# Patient Record
Sex: MALE | Race: WHITE | Employment: OTHER | URBAN - METROPOLITAN AREA
[De-identification: names, ages, dates, MRNs, and addresses within clinical notes are randomized per-mention and may not be internally consistent; named-entity substitution may affect disease eponyms.]

---

## 2017-01-06 ENCOUNTER — HOSPITAL ENCOUNTER (EMERGENCY)
Age: 62
Discharge: HOME | End: 2017-01-06
Attending: EMERGENCY MEDICINE
Payer: COMMERCIAL

## 2017-01-06 ENCOUNTER — APPOINTMENT (OUTPATIENT)
Dept: GENERAL RADIOLOGY | Age: 62
End: 2017-01-06
Attending: EMERGENCY MEDICINE
Payer: COMMERCIAL

## 2017-01-06 VITALS
BODY MASS INDEX: 28.44 KG/M2 | WEIGHT: 210 LBS | SYSTOLIC BLOOD PRESSURE: 154 MMHG | HEART RATE: 73 BPM | DIASTOLIC BLOOD PRESSURE: 78 MMHG | RESPIRATION RATE: 16 BRPM | OXYGEN SATURATION: 97 % | HEIGHT: 72 IN | TEMPERATURE: 99 F

## 2017-01-06 DIAGNOSIS — S16.1XXA CERVICAL STRAIN, INITIAL ENCOUNTER: ICD-10-CM

## 2017-01-06 DIAGNOSIS — S29.019A STRAIN OF THORACIC SPINE, INITIAL ENCOUNTER: Primary | ICD-10-CM

## 2017-01-06 PROCEDURE — 99284 EMERGENCY DEPT VISIT MOD MDM: CPT

## 2017-01-06 PROCEDURE — 72052 X-RAY EXAM NECK SPINE 6/>VWS: CPT

## 2017-01-06 PROCEDURE — 72072 X-RAY EXAM THORAC SPINE 3VWS: CPT

## 2017-01-06 RX ORDER — NAPROXEN 500 MG/1
500 TABLET ORAL 2 TIMES DAILY PRN
Qty: 20 TABLET | Refills: 0 | Status: SHIPPED | OUTPATIENT
Start: 2017-01-06 | End: 2020-05-30

## 2017-01-06 RX ORDER — ASPIRIN 81 MG/1
162 TABLET, CHEWABLE ORAL DAILY
COMMUNITY

## 2017-01-06 RX ORDER — SIMVASTATIN 20 MG
20 TABLET ORAL NIGHTLY
COMMUNITY
End: 2020-05-30

## 2017-01-06 RX ORDER — DIAZEPAM 5 MG/1
TABLET ORAL NIGHTLY PRN
Qty: 20 TABLET | Refills: 0 | Status: SHIPPED | OUTPATIENT
Start: 2017-01-06 | End: 2020-05-30

## 2017-01-06 RX ORDER — TRAMADOL HYDROCHLORIDE 50 MG/1
TABLET ORAL EVERY 4 HOURS PRN
Qty: 20 TABLET | Refills: 0 | Status: SHIPPED | OUTPATIENT
Start: 2017-01-06 | End: 2017-01-13

## 2017-01-06 RX ORDER — ENALAPRIL MALEATE 5 MG/1
5 TABLET ORAL DAILY
COMMUNITY
End: 2021-01-14
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
## 2017-01-12 ENCOUNTER — APPOINTMENT (OUTPATIENT)
Dept: CT IMAGING | Age: 62
End: 2017-01-12
Attending: EMERGENCY MEDICINE
Payer: COMMERCIAL

## 2017-01-12 ENCOUNTER — HOSPITAL ENCOUNTER (EMERGENCY)
Age: 62
Discharge: HOME | End: 2017-01-12
Attending: EMERGENCY MEDICINE
Payer: COMMERCIAL

## 2017-01-12 ENCOUNTER — APPOINTMENT (OUTPATIENT)
Dept: GENERAL RADIOLOGY | Age: 62
End: 2017-01-12
Attending: EMERGENCY MEDICINE
Payer: COMMERCIAL

## 2017-01-12 VITALS
DIASTOLIC BLOOD PRESSURE: 62 MMHG | TEMPERATURE: 98 F | HEART RATE: 74 BPM | RESPIRATION RATE: 16 BRPM | BODY MASS INDEX: 27.77 KG/M2 | WEIGHT: 205 LBS | SYSTOLIC BLOOD PRESSURE: 125 MMHG | HEIGHT: 72 IN | OXYGEN SATURATION: 98 %

## 2017-01-12 DIAGNOSIS — H81.10 VERTIGO, BENIGN POSITIONAL, UNSPECIFIED LATERALITY: Primary | ICD-10-CM

## 2017-01-12 LAB
ALBUMIN SERPL-MCNC: 3.6 G/DL (ref 3.5–4.8)
ALP LIVER SERPL-CCNC: 88 U/L (ref 45–117)
ALT SERPL-CCNC: 35 U/L (ref 17–63)
APTT PPP: 24.6 SECONDS (ref 25–34)
AST SERPL-CCNC: 22 U/L (ref 15–41)
ATRIAL RATE: 83 BPM
BASOPHILS # BLD AUTO: 0.02 X10(3) UL (ref 0–0.1)
BASOPHILS NFR BLD AUTO: 0.3 %
BILIRUB SERPL-MCNC: 0.4 MG/DL (ref 0.1–2)
BUN BLD-MCNC: 12 MG/DL (ref 8–20)
CALCIUM BLD-MCNC: 8.7 MG/DL (ref 8.3–10.3)
CHLORIDE: 109 MMOL/L (ref 101–111)
CO2: 25 MMOL/L (ref 22–32)
CREAT BLD-MCNC: 0.91 MG/DL (ref 0.7–1.3)
D-DIMER: 1.86 UG/ML FEU (ref 0–0.49)
EOSINOPHIL # BLD AUTO: 0.3 X10(3) UL (ref 0–0.3)
EOSINOPHIL NFR BLD AUTO: 5 %
ERYTHROCYTE [DISTWIDTH] IN BLOOD BY AUTOMATED COUNT: 11.9 % (ref 11.5–16)
GLUCOSE BLD-MCNC: 102 MG/DL (ref 70–99)
HCT VFR BLD AUTO: 41 % (ref 37–53)
HGB BLD-MCNC: 14.2 G/DL (ref 13–17)
IMMATURE GRANULOCYTE COUNT: 0.01 X10(3) UL (ref 0–1)
IMMATURE GRANULOCYTE RATIO %: 0.2 %
INR BLD: 0.97 (ref 0.89–1.12)
LYMPHOCYTES # BLD AUTO: 1.85 X10(3) UL (ref 0.9–4)
LYMPHOCYTES NFR BLD AUTO: 30.7 %
M PROTEIN MFR SERPL ELPH: 7.2 G/DL (ref 6.1–8.3)
MCH RBC QN AUTO: 29.2 PG (ref 27–33.2)
MCHC RBC AUTO-ENTMCNC: 34.6 G/DL (ref 31–37)
MCV RBC AUTO: 84.2 FL (ref 80–99)
MONOCYTES # BLD AUTO: 0.51 X10(3) UL (ref 0.1–0.6)
MONOCYTES NFR BLD AUTO: 8.5 %
NEUTROPHIL ABS PRELIM: 3.34 X10 (3) UL (ref 1.3–6.7)
NEUTROPHILS # BLD AUTO: 3.34 X10(3) UL (ref 1.3–6.7)
NEUTROPHILS NFR BLD AUTO: 55.3 %
P AXIS: 72 DEGREES
P-R INTERVAL: 170 MS
PLATELET # BLD AUTO: 183 10(3)UL (ref 150–450)
POTASSIUM SERPL-SCNC: 3.9 MMOL/L (ref 3.6–5.1)
PSA SERPL DL<=0.01 NG/ML-MCNC: 12.6 SECONDS (ref 11.8–14.2)
Q-T INTERVAL: 356 MS
QRS DURATION: 84 MS
QTC CALCULATION (BEZET): 418 MS
R AXIS: 55 DEGREES
RBC # BLD AUTO: 4.87 X10(6)UL (ref 4.3–5.7)
RED CELL DISTRIBUTION WIDTH-SD: 36.1 FL (ref 35.1–46.3)
SODIUM SERPL-SCNC: 143 MMOL/L (ref 136–144)
T AXIS: 66 DEGREES
TROPONIN: <0.046 NG/ML (ref ?–0.05)
VENTRICULAR RATE: 83 BPM
WBC # BLD AUTO: 6 X10(3) UL (ref 4–13)

## 2017-01-12 PROCEDURE — 85730 THROMBOPLASTIN TIME PARTIAL: CPT | Performed by: EMERGENCY MEDICINE

## 2017-01-12 PROCEDURE — 84484 ASSAY OF TROPONIN QUANT: CPT | Performed by: EMERGENCY MEDICINE

## 2017-01-12 PROCEDURE — 93005 ELECTROCARDIOGRAM TRACING: CPT

## 2017-01-12 PROCEDURE — 85610 PROTHROMBIN TIME: CPT | Performed by: EMERGENCY MEDICINE

## 2017-01-12 PROCEDURE — 71275 CT ANGIOGRAPHY CHEST: CPT

## 2017-01-12 PROCEDURE — 80053 COMPREHEN METABOLIC PANEL: CPT | Performed by: EMERGENCY MEDICINE

## 2017-01-12 PROCEDURE — 96374 THER/PROPH/DIAG INJ IV PUSH: CPT

## 2017-01-12 PROCEDURE — 70450 CT HEAD/BRAIN W/O DYE: CPT

## 2017-01-12 PROCEDURE — 93010 ELECTROCARDIOGRAM REPORT: CPT

## 2017-01-12 PROCEDURE — 85025 COMPLETE CBC W/AUTO DIFF WBC: CPT | Performed by: EMERGENCY MEDICINE

## 2017-01-12 PROCEDURE — 99285 EMERGENCY DEPT VISIT HI MDM: CPT

## 2017-01-12 PROCEDURE — 71020 XR CHEST PA + LAT CHEST (CPT=71020): CPT

## 2017-01-12 PROCEDURE — 85378 FIBRIN DEGRADE SEMIQUANT: CPT | Performed by: EMERGENCY MEDICINE

## 2017-01-12 RX ORDER — MECLIZINE HYDROCHLORIDE 25 MG/1
25 TABLET ORAL 3 TIMES DAILY PRN
Qty: 20 TABLET | Refills: 0 | Status: SHIPPED | OUTPATIENT
Start: 2017-01-12 | End: 2020-05-30

## 2017-01-12 RX ORDER — MECLIZINE HYDROCHLORIDE 25 MG/1
25 TABLET ORAL ONCE
Status: COMPLETED | OUTPATIENT
Start: 2017-01-12 | End: 2017-01-12

## 2017-01-12 RX ORDER — ONDANSETRON 4 MG/1
4 TABLET, FILM COATED ORAL EVERY 8 HOURS PRN
Qty: 20 TABLET | Refills: 0 | Status: SHIPPED | OUTPATIENT
Start: 2017-01-12 | End: 2020-05-30

## 2017-01-12 RX ORDER — ONDANSETRON 2 MG/ML
4 INJECTION INTRAMUSCULAR; INTRAVENOUS ONCE
Status: COMPLETED | OUTPATIENT
Start: 2017-01-12 | End: 2017-01-12
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
## 2017-01-12 NOTE — ED PROVIDER NOTES
Patient Seen in: THE [facility] Emergency Department In [place]    History   Patient presents with:  Dizziness (neurologic)    Stated Complaint: dizzy on and off since Sunday, slight difficulty breathing    HPI    81-year-old male presents for evaluation of d (5-10 mg total) by mouth nightly as needed (muscle spasm). TraMADol HCl 50 MG Oral Tab,  Take 1-2 tablets ( mg total) by mouth every 4 (four) hours as needed for Pain.    naproxen 500 MG Oral Tab,  Take 1 tablet (500 mg total) by mouth 2 (two) times Range is approximately 65- 104 seconds. The therapeutic range has been validated against 0.3-0.7 heparin anti-Xa units/mL.      D-DIMER - Abnormal; Notable for the following:     D-Dimer 1.86 (*)     All other components within normal limits    Narrative: shows no pulmonary embolism or dissection. Evaluation and treatment plan discussed with patient. I recommend Antivert and Zofran as needed for symptoms.   Patient should be reevaluated with his primary care physician within the next 1-2 days to ensure h

## 2019-01-23 ENCOUNTER — HOSPITAL ENCOUNTER (OUTPATIENT)
Dept: CV DIAGNOSTICS | Age: 64
Discharge: HOME | End: 2019-01-23
Attending: INTERNAL MEDICINE
Payer: COMMERCIAL

## 2019-01-23 DIAGNOSIS — R01.1 MURMUR: ICD-10-CM

## 2019-02-04 ENCOUNTER — HOSPITAL ENCOUNTER (OUTPATIENT)
Dept: CV DIAGNOSTICS | Age: 64
Discharge: HOME | End: 2019-02-04
Attending: INTERNAL MEDICINE
Payer: COMMERCIAL

## 2019-02-04 DIAGNOSIS — G80.9 CP (CEREBRAL PALSY) (HCC): ICD-10-CM

## 2019-02-04 PROCEDURE — 93018 CV STRESS TEST I&R ONLY: CPT | Performed by: INTERNAL MEDICINE

## 2019-02-04 PROCEDURE — 93017 CV STRESS TEST TRACING ONLY: CPT | Performed by: INTERNAL MEDICINE

## 2019-02-04 PROCEDURE — 78452 HT MUSCLE IMAGE SPECT MULT: CPT | Performed by: INTERNAL MEDICINE

## 2020-01-03 ENCOUNTER — HOSPITAL ENCOUNTER (OUTPATIENT)
Dept: CV DIAGNOSTICS | Facility: HOSPITAL | Age: 65
Discharge: HOME | End: 2020-01-03
Attending: INTERNAL MEDICINE
Payer: COMMERCIAL

## 2020-01-03 DIAGNOSIS — R07.2 CHEST PAIN, PRECORDIAL: ICD-10-CM

## 2020-01-03 DIAGNOSIS — Z82.49 FH: EARLY CORONARY ARTERY DISEASE: ICD-10-CM

## 2020-01-03 DIAGNOSIS — I35.0 NONRHEUMATIC AORTIC VALVE STENOSIS: ICD-10-CM

## 2020-01-06 ENCOUNTER — HOSPITAL ENCOUNTER (OUTPATIENT)
Dept: CV DIAGNOSTICS | Facility: HOSPITAL | Age: 65
Discharge: HOME | End: 2020-01-06
Attending: INTERNAL MEDICINE
Payer: COMMERCIAL

## 2020-01-06 DIAGNOSIS — Z82.49 FAMILY HISTORY OF EARLY CAD: ICD-10-CM

## 2020-01-06 DIAGNOSIS — I35.0 NONRHEUMATIC AORTIC (VALVE) STENOSIS: ICD-10-CM

## 2020-01-06 PROCEDURE — 93018 CV STRESS TEST I&R ONLY: CPT | Performed by: INTERNAL MEDICINE

## 2020-01-06 PROCEDURE — 78452 HT MUSCLE IMAGE SPECT MULT: CPT | Performed by: INTERNAL MEDICINE

## 2020-01-06 PROCEDURE — 93017 CV STRESS TEST TRACING ONLY: CPT | Performed by: INTERNAL MEDICINE

## 2020-01-10 ENCOUNTER — HOSPITAL ENCOUNTER (OUTPATIENT)
Dept: CT IMAGING | Facility: HOSPITAL | Age: 65
Discharge: HOME | End: 2020-01-10
Attending: INTERNAL MEDICINE
Payer: COMMERCIAL

## 2020-01-10 VITALS — RESPIRATION RATE: 20 BRPM | HEART RATE: 64 BPM | SYSTOLIC BLOOD PRESSURE: 109 MMHG | DIASTOLIC BLOOD PRESSURE: 66 MMHG

## 2020-01-10 DIAGNOSIS — I35.0 NONRHEUMATIC AORTIC (VALVE) STENOSIS: ICD-10-CM

## 2020-01-10 DIAGNOSIS — R07.9 CHEST PAIN: ICD-10-CM

## 2020-01-10 LAB — CREAT BLD-MCNC: 0.6 MG/DL (ref 0.7–1.3)

## 2020-01-10 PROCEDURE — 0503T CTA FRACTIONAL FLOW RESERVE ANALYSIS (CPT=0503T/0502T): CPT | Performed by: INTERNAL MEDICINE

## 2020-01-10 PROCEDURE — 75574 CT ANGIO HRT W/3D IMAGE: CPT | Performed by: INTERNAL MEDICINE

## 2020-01-10 PROCEDURE — 82565 ASSAY OF CREATININE: CPT

## 2020-01-10 PROCEDURE — 0502T CTA FRACTIONAL FLOW RESERVE ANALYSIS (CPT=0503T/0502T): CPT | Performed by: INTERNAL MEDICINE

## 2020-01-10 RX ORDER — METOPROLOL TARTRATE 5 MG/5ML
INJECTION INTRAVENOUS
Status: COMPLETED
Start: 2020-01-10 | End: 2020-01-10

## 2020-01-10 RX ORDER — DILTIAZEM HYDROCHLORIDE 5 MG/ML
INJECTION INTRAVENOUS
Status: COMPLETED
Start: 2020-01-10 | End: 2020-01-10

## 2020-01-10 RX ORDER — NITROGLYCERIN 0.4 MG/1
TABLET SUBLINGUAL
Status: COMPLETED
Start: 2020-01-10 | End: 2020-01-10

## 2020-01-10 RX ADMIN — METOPROLOL TARTRATE: 5 INJECTION INTRAVENOUS at 14:37:00

## 2020-01-10 RX ADMIN — DILTIAZEM HYDROCHLORIDE 5 MG: 5 INJECTION INTRAVENOUS at 14:47:00

## 2020-01-10 RX ADMIN — DILTIAZEM HYDROCHLORIDE 5 MG: 5 INJECTION INTRAVENOUS at 15:07:00

## 2020-01-10 RX ADMIN — NITROGLYCERIN 0.4 MG: 0.4 TABLET SUBLINGUAL at 15:00:00

## 2020-01-10 RX ADMIN — METOPROLOL TARTRATE 5 MG: 5 INJECTION INTRAVENOUS at 14:27:00

## 2020-01-10 RX ADMIN — NITROGLYCERIN 0.4 MG: 0.4 TABLET SUBLINGUAL at 15:12:00

## 2020-01-10 RX ADMIN — DILTIAZEM HYDROCHLORIDE 5 MG: 5 INJECTION INTRAVENOUS at 14:42:00

## 2020-01-10 RX ADMIN — METOPROLOL TARTRATE 5 MG: 5 INJECTION INTRAVENOUS at 14:32:00

## 2020-01-10 NOTE — IMAGING NOTE
Pt arrives to room 4 at 1412. Working with [address] K[address] Mccormack IV established by Lisa Sales to [facility] with 18 gauge angiocath x 1 attempt. Pt denies long acting nitrates. Pt positioned on CT table comfortably. Procedure explained and questions answered.  O2 applied vi

## 2020-05-29 DIAGNOSIS — Z01.812 PRE-PROCEDURE LAB EXAM: Primary | ICD-10-CM

## 2020-05-30 ENCOUNTER — APPOINTMENT (OUTPATIENT)
Dept: GENERAL RADIOLOGY | Age: 65
End: 2020-05-30
Attending: EMERGENCY MEDICINE
Payer: COMMERCIAL

## 2020-05-30 ENCOUNTER — HOSPITAL ENCOUNTER (EMERGENCY)
Age: 65
Discharge: HOME | End: 2020-05-30
Attending: EMERGENCY MEDICINE
Payer: COMMERCIAL

## 2020-05-30 VITALS
OXYGEN SATURATION: 98 % | SYSTOLIC BLOOD PRESSURE: 158 MMHG | DIASTOLIC BLOOD PRESSURE: 74 MMHG | RESPIRATION RATE: 16 BRPM | WEIGHT: 200 LBS | BODY MASS INDEX: 27.09 KG/M2 | HEART RATE: 95 BPM | TEMPERATURE: 99 F | HEIGHT: 72 IN

## 2020-05-30 DIAGNOSIS — M25.531 RIGHT WRIST PAIN: Primary | ICD-10-CM

## 2020-05-30 DIAGNOSIS — S63.501A SPRAIN OF RIGHT WRIST, INITIAL ENCOUNTER: ICD-10-CM

## 2020-05-30 PROCEDURE — 73110 X-RAY EXAM OF WRIST: CPT | Performed by: EMERGENCY MEDICINE

## 2020-05-30 PROCEDURE — 99283 EMERGENCY DEPT VISIT LOW MDM: CPT

## 2020-05-30 RX ORDER — ROSUVASTATIN CALCIUM 10 MG/1
10 TABLET, COATED ORAL NIGHTLY
COMMUNITY

## 2020-05-30 NOTE — ED PROVIDER NOTES
Patient Seen in: [facility] In [place]      History   Patient presents with:  Wrist Pain    Stated Complaint: wrist pain    HPI    61-year-old male presents emergency room with chief complaint of right wrist pain.   Patient states appr snuffbox, no tenderness over the dorsum or volar aspect of the wrist.  No erythema swelling or warmth the joint, full range of motion of the wrist.  No tenderness or swelling to the right hand.   Radial and ulnar pulse present and equal.  Capillary refill i

## 2020-07-29 ENCOUNTER — HOSPITAL ENCOUNTER (EMERGENCY)
Age: 65
Discharge: HOME | End: 2020-07-29
Attending: EMERGENCY MEDICINE
Payer: COMMERCIAL

## 2020-07-29 VITALS
DIASTOLIC BLOOD PRESSURE: 70 MMHG | RESPIRATION RATE: 16 BRPM | BODY MASS INDEX: 27.09 KG/M2 | HEIGHT: 72.05 IN | SYSTOLIC BLOOD PRESSURE: 158 MMHG | WEIGHT: 200 LBS | HEART RATE: 80 BPM | OXYGEN SATURATION: 96 %

## 2020-07-29 DIAGNOSIS — S81.812A LEG LACERATION, LEFT, INITIAL ENCOUNTER: Primary | ICD-10-CM

## 2020-07-29 PROCEDURE — 90471 IMMUNIZATION ADMIN: CPT

## 2020-07-29 PROCEDURE — 99283 EMERGENCY DEPT VISIT LOW MDM: CPT

## 2020-07-29 PROCEDURE — 12002 RPR S/N/AX/GEN/TRNK2.6-7.5CM: CPT
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
## 2020-07-30 NOTE — ED PROVIDER NOTES
Patient Seen in: THE [facility] Emergency Department In [place]      History   Patient presents with:  Laceration    Stated Complaint: laceration to left upper leg    HPI    29-year-old male presents with a laceration to the left leg.   He states he was using foreign body, vessel or nerve within the wound. The wound was closed using a simple closure with 4-0 nylon. The quality of the closure was excellent.   5 interrupted sutures were used for this closure          MDM     1.7 cm laceration to the medial aspect

## 2020-08-08 ENCOUNTER — HOSPITAL ENCOUNTER (EMERGENCY)
Age: 65
Discharge: HOME | End: 2020-08-08
Payer: COMMERCIAL

## 2020-08-08 VITALS
RESPIRATION RATE: 18 BRPM | HEART RATE: 72 BPM | TEMPERATURE: 98 F | SYSTOLIC BLOOD PRESSURE: 123 MMHG | OXYGEN SATURATION: 99 % | BODY MASS INDEX: 27.09 KG/M2 | WEIGHT: 200 LBS | HEIGHT: 72 IN | DIASTOLIC BLOOD PRESSURE: 62 MMHG

## 2020-08-08 DIAGNOSIS — Z48.02 ENCOUNTER FOR REMOVAL OF SUTURES: Primary | ICD-10-CM

## 2020-08-08 NOTE — ED PROVIDER NOTES
Patient Seen in: THE [facility] Emergency Department In [facility]      History   Patient presents with:  Sut Stap RingRemoval    Stated Complaint: TO ER FOR SUTURE REMOVAL    HPI    Patient is a pleasant 71-year-old male who arrives for suture removal.  10 days 1:29 pm    Follow-up:  Magalie Rodas MD  [address] [place] [address]  [phone]                Medications Prescribed:  Current Discharge Medication List

## 2020-09-01 ENCOUNTER — HOSPITAL ENCOUNTER (OUTPATIENT)
Dept: CV DIAGNOSTICS | Age: 65
Discharge: HOME | End: 2020-09-01
Attending: INTERNAL MEDICINE
Payer: COMMERCIAL

## 2020-09-01 DIAGNOSIS — I65.23 ASYMPTOMATIC CAROTID ARTERY STENOSIS, BILATERAL: ICD-10-CM

## 2020-10-29 ENCOUNTER — OFFICE VISIT (OUTPATIENT)
Dept: INTERNAL MEDICINE CLINIC | Facility: CLINIC | Age: 65
End: 2020-10-29
Payer: COMMERCIAL

## 2020-10-29 DIAGNOSIS — M25.531 RIGHT WRIST PAIN: ICD-10-CM

## 2020-10-29 DIAGNOSIS — I65.23 CAROTID STENOSIS, ASYMPTOMATIC, BILATERAL: Chronic | ICD-10-CM

## 2020-10-29 DIAGNOSIS — I35.0 NONRHEUMATIC AORTIC VALVE STENOSIS: Chronic | ICD-10-CM

## 2020-10-29 DIAGNOSIS — I25.10 CORONARY ARTERY DISEASE INVOLVING NATIVE CORONARY ARTERY OF NATIVE HEART WITHOUT ANGINA PECTORIS: Primary | Chronic | ICD-10-CM

## 2020-10-29 DIAGNOSIS — E78.00 PURE HYPERCHOLESTEROLEMIA: Chronic | ICD-10-CM

## 2020-10-29 DIAGNOSIS — Z00.00 PREVENTATIVE HEALTH CARE: ICD-10-CM

## 2020-10-29 DIAGNOSIS — I10 ESSENTIAL HYPERTENSION: ICD-10-CM

## 2020-10-29 DIAGNOSIS — Z12.5 SCREENING FOR MALIGNANT NEOPLASM OF PROSTATE: ICD-10-CM

## 2020-10-29 PROBLEM — Z82.49 FAMILY HISTORY OF EARLY CAD: Status: ACTIVE | Noted: 2019-10-24

## 2020-10-29 PROBLEM — Z82.49 FAMILY HISTORY OF EARLY CAD: Chronic | Status: ACTIVE | Noted: 2019-10-24

## 2020-10-29 PROCEDURE — 3079F DIAST BP 80-89 MM HG: CPT | Performed by: INTERNAL MEDICINE

## 2020-10-29 PROCEDURE — 3008F BODY MASS INDEX DOCD: CPT | Performed by: INTERNAL MEDICINE

## 2020-10-29 PROCEDURE — 3075F SYST BP GE 130 - 139MM HG: CPT | Performed by: INTERNAL MEDICINE

## 2020-10-29 PROCEDURE — 99214 OFFICE O/P EST MOD 30 MIN: CPT | Performed by: INTERNAL MEDICINE

## 2020-10-29 RX ORDER — MV-MINS/FOLIC/LYCOPENE/GINKGO 400-300MCG
1 TABLET ORAL DAILY
COMMUNITY

## 2020-10-29 RX ORDER — OMEPRAZOLE 20 MG/1
20 CAPSULE, DELAYED RELEASE ORAL DAILY
COMMUNITY
Start: 2020-08-03 | End: 2021-01-14

## 2020-10-29 NOTE — PROGRESS NOTES
Jcarlos Gonzales is a 60year old male. HPI:   Patient presents to establish care. He has been dealing with right wrist pain x 7-8 months. Underneath thumb. Aching in quality, moderate severity, intermittent timing. Pain with movement of arm.   He is 200 lb (90.7 kg)  07/29/20 : 200 lb (90.7 kg)  05/30/20 : 200 lb (90.7 kg)  01/12/17 : 205 lb (93 kg)  01/06/17 : 210 lb (95.3 kg)    EXAM:   /80   Pulse 72   Temp 97.8 °F (36.6 °C) (Oral)   Resp 16   Ht 70.3\"   Wt 220 lb 3.2 oz (99.9 kg)   SpO2 99% Continue enalapril 5 mg qd. Metabolic panel ordered. - COMP METABOLIC PANEL (14); Future    7. Preventative health care  8.  Screening for malignant neoplasm of prostate  Screening labs ordered as below - patient gets labs through [facility].  He is not sure

## 2020-10-29 NOTE — PATIENT INSTRUCTIONS
- We will get your previous records/colonoscopy records and let you know when you are due for the next colonoscopy  - You will be due for a pneumonia shot (Prevnar 13) after you turn 66  - Get blood work done when fasting (at least 8 hours).   We will call

## 2020-10-31 ENCOUNTER — TELEPHONE (OUTPATIENT)
Dept: INTERNAL MEDICINE CLINIC | Facility: CLINIC | Age: 65
End: 2020-10-31

## 2020-10-31 NOTE — TELEPHONE ENCOUNTER
Faxed medical records request to [facility] [address] ([facility] #[phone]).

## 2020-11-01 ENCOUNTER — MED REC SCAN ONLY (OUTPATIENT)
Dept: INTERNAL MEDICINE CLINIC | Facility: CLINIC | Age: 65
End: 2020-11-01

## 2020-11-01 VITALS
HEART RATE: 72 BPM | HEIGHT: 70.3 IN | OXYGEN SATURATION: 99 % | BODY MASS INDEX: 31.17 KG/M2 | SYSTOLIC BLOOD PRESSURE: 138 MMHG | TEMPERATURE: 98 F | RESPIRATION RATE: 16 BRPM | WEIGHT: 220.19 LBS | DIASTOLIC BLOOD PRESSURE: 80 MMHG

## 2020-11-01 PROBLEM — M25.531 RIGHT WRIST PAIN: Status: ACTIVE | Noted: 2020-11-01

## 2020-11-02 ENCOUNTER — TELEPHONE (OUTPATIENT)
Dept: INTERNAL MEDICINE CLINIC | Facility: CLINIC | Age: 65
End: 2020-11-02

## 2020-11-03 NOTE — TELEPHONE ENCOUNTER
Records reviewed. Patient with history of meatal stenosis. Saw urology for this in the early 2000's. Echocardiogram done in 2018.  It showed EF of 65%, mild to moderate aortic stenosis and regurgitation, mild mitral regurgitation, mild tricuspid regurgitat

## 2020-11-16 ENCOUNTER — TELEPHONE (OUTPATIENT)
Dept: INTERNAL MEDICINE CLINIC | Facility: CLINIC | Age: 65
End: 2020-11-16

## 2020-11-16 DIAGNOSIS — Z20.822 EXPOSURE TO COVID-19 VIRUS: Primary | ICD-10-CM

## 2020-11-16 NOTE — TELEPHONE ENCOUNTER
Pt requesting COVID antibody testing. Pt currently with no COVID  symptoms or known exposure. Just wanting to know if he possibly could have had virus in the past and not know.

## 2020-11-16 NOTE — TELEPHONE ENCOUNTER
Patient has upcoming lab appointment for bloodwork; he would like to know if he can have COVID 19 antibody test added to orders please advise/callback

## 2020-11-17 ENCOUNTER — TELEPHONE (OUTPATIENT)
Dept: INTERNAL MEDICINE CLINIC | Facility: CLINIC | Age: 65
End: 2020-11-17

## 2020-11-17 NOTE — TELEPHONE ENCOUNTER
Carmen Lefort from [facility] calling for fax of MRI order of wrist please fax to [phone]

## 2020-11-23 ENCOUNTER — TELEPHONE (OUTPATIENT)
Dept: INTERNAL MEDICINE CLINIC | Facility: CLINIC | Age: 65
End: 2020-11-23

## 2020-11-23 NOTE — TELEPHONE ENCOUNTER
Received lab work results for specimen collected on 11/21/2020 from [facility] in your [facility].      Updated Epic>

## 2020-11-30 ENCOUNTER — PATIENT MESSAGE (OUTPATIENT)
Dept: INTERNAL MEDICINE CLINIC | Facility: CLINIC | Age: 65
End: 2020-11-30

## 2020-11-30 PROBLEM — D69.6 THROMBOCYTOPENIA: Status: ACTIVE | Noted: 2020-11-30

## 2020-11-30 PROBLEM — R74.8 ELEVATED ALKALINE PHOSPHATASE LEVEL: Status: ACTIVE | Noted: 2020-11-30

## 2020-11-30 PROBLEM — D69.6 THROMBOCYTOPENIA (HCC): Status: ACTIVE | Noted: 2020-11-30

## 2020-11-30 PROBLEM — E05.90 HYPERTHYROIDISM: Status: ACTIVE | Noted: 2020-11-30

## 2020-11-30 NOTE — TELEPHONE ENCOUNTER
OM Latam message sent to patient outlining results. Mild thrombocytopenia, will monitor for now. Mildly elevated alk phos, focus on diet/exercise, monitor for now. Other liver tests normal.  TSH low, free T4 high, consistent with hyperthyroidism.   Will h

## 2020-12-07 NOTE — TELEPHONE ENCOUNTER
MyChart message notification that it was unread. Called and spoke with patient:  Notified of RP's MyChart message below and verbalized understanding.

## 2020-12-11 NOTE — PROGRESS NOTES
Received MRI report from [facility] for right wrist.  Shows multifocal degenerative changes, full thickness tear of articular disc, mild tendinopathy.   Recommend follow up with Orthopedic hand specialist.  [address] [place] [address] [address] message sent to patient outlining

## 2020-12-17 ENCOUNTER — TELEMEDICINE (OUTPATIENT)
Dept: INTERNAL MEDICINE CLINIC | Facility: CLINIC | Age: 65
End: 2020-12-17
Payer: COMMERCIAL

## 2020-12-17 DIAGNOSIS — R51.9 GENERALIZED HEADACHE: ICD-10-CM

## 2020-12-17 DIAGNOSIS — U07.1 COVID-19 VIRUS INFECTION: Primary | ICD-10-CM

## 2020-12-17 DIAGNOSIS — R07.89 CHEST PRESSURE: ICD-10-CM

## 2020-12-17 PROCEDURE — 99214 OFFICE O/P EST MOD 30 MIN: CPT | Performed by: PHYSICIAN ASSISTANT

## 2020-12-17 NOTE — PROGRESS NOTES
Virtual Video Check-In     This visit is conducted using Telemedicine with live, interactive video and audio. Sona Shanthi, who has verified his/her identification by name and , verbally consents to a Virtual/Telephone Check-In visit on 20. this visit.   GENERAL: well developed, well nourished, in no acute distress  LUNGS: regular breathing rate and effort with no audible respiratory distress  NEURO: A&Ox3  PSYCH: pleasant mood and affect, appropriate judgement and insight    ASSESSMENT/PLAN:

## 2021-01-14 RX ORDER — ENALAPRIL MALEATE 5 MG/1
TABLET ORAL
Qty: 90 TABLET | Refills: 1 | OUTPATIENT
Start: 2021-01-14

## 2021-01-14 RX ORDER — ENALAPRIL MALEATE 5 MG/1
5 TABLET ORAL DAILY
Qty: 90 TABLET | Refills: 1 | Status: SHIPPED | OUTPATIENT
Start: 2021-01-14 | End: 2021-07-12

## 2021-01-14 RX ORDER — OMEPRAZOLE 20 MG/1
20 CAPSULE, DELAYED RELEASE ORAL DAILY
Qty: 90 CAPSULE | Refills: 1 | Status: SHIPPED | OUTPATIENT
Start: 2021-01-14 | End: 2021-07-12

## 2021-01-14 NOTE — TELEPHONE ENCOUNTER
Passed protocol     Last refill:  Entered historically Enalapril 5 mg -     Last telemedicine 12/17/2020 - RTC 1 month for CPX    10/29/2020 Dr Keyla Patton RTC 6 months  6. Essential hypertension  Repeat reading at goal.  Continue enalapril 5 mg qd.   Metabolic

## 2021-01-14 NOTE — TELEPHONE ENCOUNTER
**Enalapril on duplicate request so removed from this request.     Failed protocol - omeprazole  Passed protocol - Enalapril     Last refill:  Entered historically: Omeprazole 20 mg + Enalapril 5 mg    Last [facility] 12/17/2020 Syed Phillips   10/29/202

## 2021-01-14 NOTE — TELEPHONE ENCOUNTER
Enalapril Maleate 5 MG Oral Tab       Sig:   Take 5 mg by mouth daily. Route:   Oral       [facility]/pharmacy #[id number] - [place], [place] - [address] [phone], [address] [place] [address]   Phone: [phone] Fax:

## 2021-02-01 ENCOUNTER — MED REC SCAN ONLY (OUTPATIENT)
Dept: INTERNAL MEDICINE CLINIC | Facility: CLINIC | Age: 66
End: 2021-02-01

## 2021-02-15 ENCOUNTER — OFFICE VISIT (OUTPATIENT)
Dept: ORTHOPEDICS CLINIC | Facility: CLINIC | Age: 66
End: 2021-02-15
Payer: COMMERCIAL

## 2021-02-15 VITALS — HEART RATE: 87 BPM | OXYGEN SATURATION: 100 %

## 2021-02-15 DIAGNOSIS — M18.10 ARTHRITIS OF CARPOMETACARPAL (CMC) JOINT OF THUMB: Primary | ICD-10-CM

## 2021-02-15 PROCEDURE — 20600 DRAIN/INJ JOINT/BURSA W/O US: CPT | Performed by: ORTHOPAEDIC SURGERY

## 2021-02-15 PROCEDURE — 99203 OFFICE O/P NEW LOW 30 MIN: CPT | Performed by: ORTHOPAEDIC SURGERY

## 2021-02-15 RX ORDER — TRIAMCINOLONE ACETONIDE 40 MG/ML
40 INJECTION, SUSPENSION INTRA-ARTICULAR; INTRAMUSCULAR ONCE
Status: COMPLETED | OUTPATIENT
Start: 2021-02-15 | End: 2021-02-15

## 2021-02-15 RX ADMIN — TRIAMCINOLONE ACETONIDE 40 MG: 40 INJECTION, SUSPENSION INTRA-ARTICULAR; INTRAMUSCULAR at 12:30:00

## 2021-02-15 NOTE — PROCEDURES
Aia 16 Joint Injection:    Written consent was obtained. Skin was prepped with ChloraPrep.  2 mL mixture of 1 mL of 40 mg of Kenalog and 1 mL of 1% lidocaine was injected into the right CMC joint. Patient tolerated the procedure.   No complications were enco

## 2021-02-16 NOTE — H&P
[facility] New Patient Note    CC: Right thumb pain    HPI: This 60year old right-hand-dominant male presents with base of right thumb pain. This has been going on for more than 6 months now. He works as a farmer.   He has been having this nagging wheezes. Neurological: No obvious cranial nerve deficit. Skin: Skin is warm and dry. Not diaphoretic.    Psychiatric: Patient has a normal mood and affect and behavior is normal. Judgment and thought content normal.    Right thumb: Tenderness to palpati

## 2021-07-12 RX ORDER — ENALAPRIL MALEATE 5 MG/1
TABLET ORAL
Qty: 30 TABLET | Refills: 0 | Status: SHIPPED | OUTPATIENT
Start: 2021-07-12 | End: 2021-08-09

## 2021-07-12 RX ORDER — OMEPRAZOLE 20 MG/1
CAPSULE, DELAYED RELEASE ORAL
Qty: 30 CAPSULE | Refills: 0 | Status: SHIPPED | OUTPATIENT
Start: 2021-07-12 | End: 2021-08-09

## 2021-07-12 NOTE — TELEPHONE ENCOUNTER
Failed protocol     Last refill:  omeprazole 20 MG Oral Capsule Delayed Release 90 capsule 1 1/14/2021    Sig:   Take 1 capsule (20 mg total) by mouth daily.        Enalapril Maleate 5 MG Oral Tab 90 tablet 1 1/14/2021    Sig:   Take 1 tablet (5 mg total) b

## 2021-08-09 RX ORDER — ENALAPRIL MALEATE 5 MG/1
5 TABLET ORAL DAILY
Qty: 90 TABLET | Refills: 0 | Status: SHIPPED | OUTPATIENT
Start: 2021-08-09 | End: 2021-11-07

## 2021-08-09 RX ORDER — OMEPRAZOLE 20 MG/1
20 CAPSULE, DELAYED RELEASE ORAL DAILY
Qty: 90 CAPSULE | Refills: 0 | Status: SHIPPED | OUTPATIENT
Start: 2021-08-09 | End: 2021-11-07

## 2021-08-09 NOTE — TELEPHONE ENCOUNTER
Failed protocol     Last refill:  ENALAPRIL MALEATE 5 MG Oral Tab 30 tablet 0 7/12/2021    Sig:   TAKE 1 TABLET BY MOUTH EVERY DAY       OMEPRAZOLE 20 MG Oral Capsule Delayed Release 30 capsule 0 7/12/2021    Sig:   TAKE 1 CAPSULE BY MOUTH EVERY DAY

## 2021-11-05 ENCOUNTER — TELEPHONE (OUTPATIENT)
Dept: INTERNAL MEDICINE CLINIC | Facility: CLINIC | Age: 66
End: 2021-11-05

## 2021-11-05 DIAGNOSIS — E78.00 PURE HYPERCHOLESTEROLEMIA: Primary | ICD-10-CM

## 2021-11-05 DIAGNOSIS — Z00.00 LABORATORY EXAMINATION ORDERED AS PART OF A ROUTINE GENERAL MEDICAL EXAMINATION: ICD-10-CM

## 2021-11-05 DIAGNOSIS — D69.6 THROMBOCYTOPENIA (HCC): ICD-10-CM

## 2021-11-05 DIAGNOSIS — E05.90 HYPERTHYROIDISM: ICD-10-CM

## 2021-11-05 DIAGNOSIS — Z12.5 SCREENING FOR MALIGNANT NEOPLASM OF PROSTATE: ICD-10-CM

## 2021-11-05 NOTE — TELEPHONE ENCOUNTER
Pt called requesting to have his lab work ordered without coming in for physical. Pt informed he would be due for an appointment. Pt still requesting for lab work to be ordered without appointment.     Please advise     LOV: 10/29/20

## 2021-11-05 NOTE — TELEPHONE ENCOUNTER
LMTCB--- Please let patient know that fasting lab orders have been placed. He can contact central scheduling to make an appointment.

## 2021-11-06 NOTE — TELEPHONE ENCOUNTER
LOV: 12/17/2020 with Tierra Shen PA-C  RTC: 1 month (CPX)  Last Relevant Labs: 11/21/2020  Filled: 8/9/2021    #90 with 0 refills    No future appointments.

## 2021-11-07 RX ORDER — OMEPRAZOLE 20 MG/1
CAPSULE, DELAYED RELEASE ORAL
Qty: 90 CAPSULE | Refills: 0 | Status: SHIPPED | OUTPATIENT
Start: 2021-11-07

## 2021-11-07 RX ORDER — ENALAPRIL MALEATE 5 MG/1
TABLET ORAL
Qty: 90 TABLET | Refills: 0 | Status: SHIPPED | OUTPATIENT
Start: 2021-11-07 | End: 2022-02-05

## 2021-11-08 NOTE — TELEPHONE ENCOUNTER
Pt requesting to order labs to [facility] instead. Please advise     Does pt need to  paper orders?

## 2022-02-05 NOTE — TELEPHONE ENCOUNTER
Failed protocol     Last refill:  11/7/2021 Enalapril 5 mg #90 NR    Last telemedicine 12/17/2020 Harika Law   10/29/2020 Dr Ronal Blunt   No FOV scheduled

## 2022-02-06 RX ORDER — ENALAPRIL MALEATE 5 MG/1
5 TABLET ORAL DAILY
Qty: 30 TABLET | Refills: 0 | Status: SHIPPED | OUTPATIENT
Start: 2022-02-06

## 2022-04-05 ENCOUNTER — ORDER TRANSCRIPTION (OUTPATIENT)
Dept: ADMINISTRATIVE | Facility: HOSPITAL | Age: 67
End: 2022-04-05

## 2022-04-15 ENCOUNTER — LAB ENCOUNTER (OUTPATIENT)
Dept: LAB | Facility: HOSPITAL | Age: 67
End: 2022-04-15
Attending: INTERNAL MEDICINE
Payer: COMMERCIAL

## 2022-04-15 DIAGNOSIS — Z11.59 ENCOUNTER FOR SCREENING FOR OTHER VIRAL DISEASES: ICD-10-CM

## 2022-04-15 DIAGNOSIS — Z01.818 PREOP EXAMINATION: ICD-10-CM

## 2022-04-16 LAB — SARS-COV-2 RNA RESP QL NAA+PROBE: NOT DETECTED

## 2022-04-18 ENCOUNTER — HOSPITAL ENCOUNTER (OUTPATIENT)
Dept: INTERVENTIONAL RADIOLOGY/VASCULAR | Facility: HOSPITAL | Age: 67
Discharge: HOME | End: 2022-04-18
Attending: INTERNAL MEDICINE | Admitting: INTERNAL MEDICINE
Payer: COMMERCIAL

## 2022-04-18 VITALS
BODY MASS INDEX: 27.2 KG/M2 | HEIGHT: 70 IN | HEART RATE: 91 BPM | DIASTOLIC BLOOD PRESSURE: 73 MMHG | SYSTOLIC BLOOD PRESSURE: 152 MMHG | OXYGEN SATURATION: 98 % | TEMPERATURE: 99 F | WEIGHT: 190 LBS | RESPIRATION RATE: 19 BRPM

## 2022-04-18 DIAGNOSIS — I48.91 ATRIAL FIBRILLATION, UNSPECIFIED TYPE (HCC): ICD-10-CM

## 2022-04-18 PROCEDURE — 92960 CARDIOVERSION ELECTRIC EXT: CPT

## 2022-04-18 PROCEDURE — 5A2204Z RESTORATION OF CARDIAC RHYTHM, SINGLE: ICD-10-PCS | Performed by: INTERNAL MEDICINE

## 2022-04-18 RX ORDER — AMIODARONE HYDROCHLORIDE 200 MG/1
200 TABLET ORAL 2 TIMES DAILY
Qty: 60 TABLET | Refills: 3 | Status: SHIPPED | OUTPATIENT
Start: 2022-04-18 | End: 2022-05-18

## 2022-04-18 RX ORDER — SODIUM CHLORIDE 9 MG/ML
INJECTION, SOLUTION INTRAVENOUS CONTINUOUS
Status: DISCONTINUED | OUTPATIENT
Start: 2022-04-18 | End: 2022-04-19

## 2022-04-18 RX ORDER — AMIODARONE HYDROCHLORIDE 200 MG/1
200 TABLET ORAL 2 TIMES DAILY
Qty: 60 TABLET | Refills: 3 | Status: SHIPPED | OUTPATIENT
Start: 2022-04-18 | End: 2022-04-18

## 2022-04-18 RX ADMIN — SODIUM CHLORIDE: 9 INJECTION, SOLUTION INTRAVENOUS at 11:15:00

## 2022-04-18 RX ADMIN — Medication 60 MG: at 10:10:00

## 2022-04-18 RX ADMIN — SODIUM CHLORIDE: 9 INJECTION, SOLUTION INTRAVENOUS at 09:15:00

## 2022-04-18 NOTE — PROCEDURES
Electrophysiology Procedure Note    Jordan Both Location: Cath Lab   CSN [id number] MRN [id number]   Admission Date 4/18/2022  Operation Date 04/18/22    Attending Physician Katie Reveles MD Operating Physician Katie Reveles MD     Pre-Operative Diagnosis: Afib    Post-Operative Diagnosis: Same as above  PROCEDURE(S) PERFORMED:    1. Cardioversion. 2.     Sedation      :  Katie Reveles MD     ANESTHESIA:  IV sedation. Moderate conscious sedation for this procedure was administered and personally monitored. Brevital 40mg, then 20 mg mg  Sedation time: 15 minutes     INDICATION:  Persistent Atrial Fibrillation     COMPLICATIONS:  None. METHODS:  The patient was brought to the outpatient cardiac telemetry unit in a fasting and nonsedated state after providing informed consent. IV sedation was administered during continuous ECG, pulse oximeter and noninvasive hemodynamic monitoring. After administering IV Brevital in intermittent boluses, the desired level of sedation was achieved. Cardioversion Energy: 200J - NSR then Return of AF after 30 sec. 360 J then SR followed by return of AF after 30 sec. Pre- Cardioversion Rhythm- AF  Post Cardioversion Rhythm - NSR- IRAF    CONCLUSIONS:  1.     Successful cardioversion       Plan:  1- Rhythm/Rate Control Meds: Will Start   Amiodarone 200 mg bid for 1 month and then down to 200 mg daily  2) Anticoagulation- Eliquis  3) Follow Up- in my office to reschedule DCCV in 6 weeks                    Katie Reveles MD     Cardiac Electrophysiololgy  [address] [place] [address]  7/14/2021

## 2022-04-18 NOTE — PROGRESS NOTES
Pt s/p synchronized cardioversion. 2 shocks delivered and converted to SR only momentarily after each shock. Post procedure VSS, Afib per monitor, denied pain. New prescription for amiodarone placed-pts pharmacy called to verify. Pt tolerated PO intake and ambulation in room. IV d/c'd. Discharge instruction given to pt-verbalized understanding. Pt to lobby via [address]. Pt's daughter in law to drive home.

## 2022-05-14 ENCOUNTER — OFFICE VISIT (OUTPATIENT)
Dept: FAMILY MEDICINE CLINIC | Facility: CLINIC | Age: 67
End: 2022-05-14
Payer: COMMERCIAL

## 2022-05-14 VITALS
TEMPERATURE: 99 F | RESPIRATION RATE: 16 BRPM | WEIGHT: 190 LBS | BODY MASS INDEX: 27 KG/M2 | HEART RATE: 97 BPM | OXYGEN SATURATION: 99 %

## 2022-05-14 DIAGNOSIS — M62.838 MUSCLE SPASMS OF NECK: Primary | ICD-10-CM

## 2022-05-14 PROCEDURE — 1111F DSCHRG MED/CURRENT MED MERGE: CPT | Performed by: NURSE PRACTITIONER

## 2022-05-14 PROCEDURE — 99213 OFFICE O/P EST LOW 20 MIN: CPT | Performed by: NURSE PRACTITIONER

## 2022-05-14 RX ORDER — CYCLOBENZAPRINE HCL 10 MG
10 TABLET ORAL 3 TIMES DAILY PRN
Qty: 20 TABLET | Refills: 0 | Status: SHIPPED | OUTPATIENT
Start: 2022-05-14

## 2022-05-14 RX ORDER — PREDNISONE 20 MG/1
40 TABLET ORAL DAILY
Qty: 10 TABLET | Refills: 0 | Status: SHIPPED | OUTPATIENT
Start: 2022-05-14 | End: 2022-05-19

## 2022-07-08 ENCOUNTER — HOSPITAL ENCOUNTER (OUTPATIENT)
Dept: INTERVENTIONAL RADIOLOGY/VASCULAR | Facility: HOSPITAL | Age: 67
Discharge: HOME | End: 2022-07-08
Attending: INTERNAL MEDICINE
Payer: COMMERCIAL

## 2022-07-12 ENCOUNTER — LAB ENCOUNTER (OUTPATIENT)
Dept: LAB | Facility: HOSPITAL | Age: 67
End: 2022-07-12
Attending: INTERNAL MEDICINE
Payer: COMMERCIAL

## 2022-07-12 DIAGNOSIS — Z20.822 ENCOUNTER FOR PREPROCEDURE SCREENING LABORATORY TESTING FOR COVID-19: ICD-10-CM

## 2022-07-12 DIAGNOSIS — Z01.812 ENCOUNTER FOR PREPROCEDURE SCREENING LABORATORY TESTING FOR COVID-19: ICD-10-CM

## 2022-07-13 LAB — SARS-COV-2 RNA RESP QL NAA+PROBE: NOT DETECTED

## 2022-07-15 ENCOUNTER — HOSPITAL ENCOUNTER (OUTPATIENT)
Dept: INTERVENTIONAL RADIOLOGY/VASCULAR | Facility: HOSPITAL | Age: 67
Discharge: HOME | End: 2022-07-15
Attending: INTERNAL MEDICINE | Admitting: INTERNAL MEDICINE
Payer: COMMERCIAL

## 2022-07-15 VITALS
TEMPERATURE: 97 F | OXYGEN SATURATION: 98 % | BODY MASS INDEX: 27.09 KG/M2 | HEIGHT: 72 IN | HEART RATE: 68 BPM | WEIGHT: 200 LBS | SYSTOLIC BLOOD PRESSURE: 140 MMHG | RESPIRATION RATE: 18 BRPM | DIASTOLIC BLOOD PRESSURE: 75 MMHG

## 2022-07-15 DIAGNOSIS — I48.91 ATRIAL FIBRILLATION, UNSPECIFIED TYPE (HCC): ICD-10-CM

## 2022-07-15 LAB
ATRIAL RATE: 57 BPM
P AXIS: 70 DEGREES
P-R INTERVAL: 216 MS
Q-T INTERVAL: 438 MS
QRS DURATION: 102 MS
QTC CALCULATION (BEZET): 426 MS
R AXIS: 47 DEGREES
T AXIS: 70 DEGREES
VENTRICULAR RATE: 57 BPM

## 2022-07-15 PROCEDURE — 92960 CARDIOVERSION ELECTRIC EXT: CPT | Performed by: INTERNAL MEDICINE

## 2022-07-15 PROCEDURE — 93005 ELECTROCARDIOGRAM TRACING: CPT

## 2022-07-15 PROCEDURE — 93010 ELECTROCARDIOGRAM REPORT: CPT | Performed by: INTERNAL MEDICINE

## 2022-07-15 PROCEDURE — 5A2204Z RESTORATION OF CARDIAC RHYTHM, SINGLE: ICD-10-PCS | Performed by: INTERNAL MEDICINE

## 2022-07-15 RX ORDER — SODIUM CHLORIDE 9 MG/ML
INJECTION, SOLUTION INTRAVENOUS CONTINUOUS
Status: DISCONTINUED | OUTPATIENT
Start: 2022-07-15 | End: 2022-07-15

## 2022-07-15 RX ADMIN — Medication 40 MG: at 14:19:00

## 2022-07-15 RX ADMIN — SODIUM CHLORIDE: 9 INJECTION, SOLUTION INTRAVENOUS at 11:45:00

## 2022-07-15 NOTE — PROGRESS NOTES
Pt s/p successful synchronized cardioversion. Post procedure EKG done, VSS, neurologically intact, denied pain, tolerated PO and ambulation in room. IV d/c'd. Discharge instructions given-pt verbalized understanding. Pt to lobby via [facility] and daughter in law to drive home.

## 2022-07-15 NOTE — PROCEDURES
Electrophysiology Procedure Note    Kaia Mcrae Location: Cath Lab   CSN [id number] MRN [id number]   Admission Date 7/15/2022  Operation Date 07/15/22    Attending Physician Jany Lowery MD Operating Physician Jany Lowery MD     Pre-Operative Diagnosis: Afib    Post-Operative Diagnosis: Same as above  PROCEDURE(S) PERFORMED:    1. Cardioversion. 2.     Sedation      :  Jany Lowery MD     ANESTHESIA:  IV sedation. Moderate conscious sedation for this procedure was administered and personally monitored. Brevital 40 mg mg  Sedation time: 2:19-2:22 pm     INDICATION:  Persistent Atrial Fibrillation     COMPLICATIONS:  None. METHODS:  The patient was brought to the outpatient cardiac telemetry unit in a fasting and nonsedated state after providing informed consent. IV sedation was administered during continuous ECG, pulse oximeter and noninvasive hemodynamic monitoring. After administering IV Brevital in intermittent boluses, the desired level of sedation was achieved. Cardioversion Energy:  200J    Pad Position: Base-Beaver  Pre- Cardioversion Rhythm- Afib  Post Cardioversion Rhythm - NSR67 bpm    CONCLUSIONS:  1.     Successful cardioversion       Plan:  1- Rhythm/Rate Control Meds: Amiodarone 200 mg daily  2) Anticoagulation- Continue Eliquis  3) Follow Up- 1 month                    Jany Lowery MD     Cardiac Electrophysiololgy  [address] [place] [address]  7/14/2021

## 2022-11-08 ENCOUNTER — OFFICE VISIT (OUTPATIENT)
Dept: FAMILY MEDICINE CLINIC | Facility: CLINIC | Age: 67
End: 2022-11-08
Payer: COMMERCIAL

## 2022-11-08 ENCOUNTER — HOSPITAL ENCOUNTER (OUTPATIENT)
Dept: GENERAL RADIOLOGY | Age: 67
Discharge: HOME | End: 2022-11-08
Attending: FAMILY MEDICINE
Payer: COMMERCIAL

## 2022-11-08 VITALS
OXYGEN SATURATION: 98 % | BODY MASS INDEX: 29.79 KG/M2 | TEMPERATURE: 98 F | HEART RATE: 62 BPM | HEIGHT: 71 IN | WEIGHT: 212.81 LBS

## 2022-11-08 DIAGNOSIS — M54.2 NECK PAIN: ICD-10-CM

## 2022-11-08 DIAGNOSIS — E05.90 HYPERTHYROIDISM: ICD-10-CM

## 2022-11-08 DIAGNOSIS — I65.23 CAROTID STENOSIS, ASYMPTOMATIC, BILATERAL: ICD-10-CM

## 2022-11-08 DIAGNOSIS — I10 ESSENTIAL HYPERTENSION: ICD-10-CM

## 2022-11-08 DIAGNOSIS — E78.00 PURE HYPERCHOLESTEROLEMIA: ICD-10-CM

## 2022-11-08 DIAGNOSIS — M54.2 NECK PAIN: Primary | ICD-10-CM

## 2022-11-08 PROCEDURE — 99204 OFFICE O/P NEW MOD 45 MIN: CPT | Performed by: FAMILY MEDICINE

## 2022-11-08 PROCEDURE — 3008F BODY MASS INDEX DOCD: CPT | Performed by: FAMILY MEDICINE

## 2022-11-08 PROCEDURE — 72040 X-RAY EXAM NECK SPINE 2-3 VW: CPT | Performed by: FAMILY MEDICINE

## 2022-11-08 RX ORDER — AMIODARONE HYDROCHLORIDE 200 MG/1
200 TABLET ORAL EVERY MORNING
COMMUNITY
Start: 2022-10-14

## 2022-11-08 RX ORDER — CYCLOBENZAPRINE HCL 10 MG
10 TABLET ORAL 2 TIMES DAILY
Qty: 14 TABLET | Refills: 0 | Status: SHIPPED | OUTPATIENT
Start: 2022-11-08 | End: 2022-11-15

## 2022-11-09 ENCOUNTER — TELEPHONE (OUTPATIENT)
Dept: FAMILY MEDICINE CLINIC | Facility: CLINIC | Age: 67
End: 2022-11-09

## 2022-11-09 DIAGNOSIS — M25.78 OSTEOPHYTE OF CERVICAL SPINE: ICD-10-CM

## 2022-11-09 DIAGNOSIS — M47.812 OSTEOARTHRITIS OF CERVICAL SPINE, UNSPECIFIED SPINAL OSTEOARTHRITIS COMPLICATION STATUS: Primary | ICD-10-CM

## 2022-11-09 NOTE — TELEPHONE ENCOUNTER
Patient advised. Verbalized understanding.    Faxed order to [facility] in [place]  Fax #: [phone]

## 2022-11-09 NOTE — TELEPHONE ENCOUNTER
----- Message from Naz Hines MD sent at 11/9/2022  1:12 PM CST -----  Results reviewed. It does shows degenerative changes of cervical spine. I would recommend we start with some physical therapy and if symptoms do not improve then consider mri.  If he is not willling to do PT then we can try mri spine but not sure if it will be covered by insurance

## 2022-11-09 NOTE — TELEPHONE ENCOUNTER
Patient advised. Verbalized understanding.    States his ins through the [employer] covers MRI with [facility]  Would like MRI ordered  Please advise

## 2022-11-18 ENCOUNTER — TELEPHONE (OUTPATIENT)
Dept: FAMILY MEDICINE CLINIC | Facility: CLINIC | Age: 67
End: 2022-11-18

## 2022-11-18 NOTE — TELEPHONE ENCOUNTER
meenakshi with imaging center called said pt called to schedule MRI. She is requesting the order be faxed to [phone].  So that she can schedule the pt

## 2023-01-09 ENCOUNTER — TELEPHONE (OUTPATIENT)
Dept: FAMILY MEDICINE CLINIC | Facility: CLINIC | Age: 68
End: 2023-01-09

## 2023-03-21 ENCOUNTER — HOSPITAL ENCOUNTER (OUTPATIENT)
Dept: GENERAL RADIOLOGY | Age: 68
Discharge: HOME | End: 2023-03-21
Attending: FAMILY MEDICINE
Payer: COMMERCIAL

## 2023-03-21 ENCOUNTER — OFFICE VISIT (OUTPATIENT)
Dept: FAMILY MEDICINE CLINIC | Facility: CLINIC | Age: 68
End: 2023-03-21
Payer: COMMERCIAL

## 2023-03-21 VITALS
HEIGHT: 71 IN | SYSTOLIC BLOOD PRESSURE: 150 MMHG | OXYGEN SATURATION: 98 % | BODY MASS INDEX: 29.96 KG/M2 | RESPIRATION RATE: 18 BRPM | TEMPERATURE: 98 F | WEIGHT: 214 LBS | HEART RATE: 86 BPM | DIASTOLIC BLOOD PRESSURE: 88 MMHG

## 2023-03-21 DIAGNOSIS — M25.562 ACUTE PAIN OF LEFT KNEE: Primary | ICD-10-CM

## 2023-03-21 DIAGNOSIS — I10 ESSENTIAL HYPERTENSION: ICD-10-CM

## 2023-03-21 DIAGNOSIS — D69.6 LOW PLATELET COUNT (HCC): ICD-10-CM

## 2023-03-21 DIAGNOSIS — M25.562 ACUTE PAIN OF LEFT KNEE: ICD-10-CM

## 2023-03-21 DIAGNOSIS — E05.90 HYPERTHYROIDISM: ICD-10-CM

## 2023-03-21 LAB
ERYTHROCYTE [DISTWIDTH] IN BLOOD BY AUTOMATED COUNT: 12 %
HCT VFR BLD AUTO: 41.4 %
HGB BLD-MCNC: 14.2 G/DL
MCH RBC QN AUTO: 29.4 PG (ref 26–34)
MCHC RBC AUTO-ENTMCNC: 34.3 G/DL (ref 31–37)
MCV RBC AUTO: 85.7 FL
PLATELET # BLD AUTO: 162 10(3)UL (ref 150–450)
RBC # BLD AUTO: 4.83 X10(6)UL
T4 FREE SERPL-MCNC: 2.4 NG/DL (ref 0.8–1.7)
TSI SER-ACNC: <0.005 MIU/ML (ref 0.36–3.74)
WBC # BLD AUTO: 6.4 X10(3) UL (ref 4–11)

## 2023-03-21 PROCEDURE — 73560 X-RAY EXAM OF KNEE 1 OR 2: CPT | Performed by: FAMILY MEDICINE

## 2023-03-21 PROCEDURE — 99214 OFFICE O/P EST MOD 30 MIN: CPT | Performed by: FAMILY MEDICINE

## 2023-03-21 PROCEDURE — 85027 COMPLETE CBC AUTOMATED: CPT | Performed by: FAMILY MEDICINE

## 2023-03-21 PROCEDURE — 3079F DIAST BP 80-89 MM HG: CPT | Performed by: FAMILY MEDICINE

## 2023-03-21 PROCEDURE — 3008F BODY MASS INDEX DOCD: CPT | Performed by: FAMILY MEDICINE

## 2023-03-21 PROCEDURE — 84443 ASSAY THYROID STIM HORMONE: CPT | Performed by: FAMILY MEDICINE

## 2023-03-21 PROCEDURE — 3077F SYST BP >= 140 MM HG: CPT | Performed by: FAMILY MEDICINE

## 2023-03-21 PROCEDURE — 84439 ASSAY OF FREE THYROXINE: CPT | Performed by: FAMILY MEDICINE

## 2023-03-24 ENCOUNTER — MED REC SCAN ONLY (OUTPATIENT)
Dept: FAMILY MEDICINE CLINIC | Facility: CLINIC | Age: 68
End: 2023-03-24

## 2023-03-29 ENCOUNTER — TELEPHONE (OUTPATIENT)
Dept: FAMILY MEDICINE CLINIC | Facility: CLINIC | Age: 68
End: 2023-03-29

## 2023-03-29 DIAGNOSIS — E05.90 HYPERTHYROIDISM: Primary | ICD-10-CM

## 2023-03-29 DIAGNOSIS — M25.562 ACUTE PAIN OF LEFT KNEE: ICD-10-CM

## 2023-03-29 NOTE — TELEPHONE ENCOUNTER
----- Message from Morales Nichole MD sent at 3/28/2023  6:19 PM CDT -----  Results reviewed. Please inform patient TSH levels are low. Blood count normal.  I did recommend he see endocrine.   Can you put referral for Dr. Varun Way for hyperthyroidism

## 2023-03-29 NOTE — TELEPHONE ENCOUNTER
----- Message from Aline Grullon MD sent at 3/28/2023  6:18 PM CDT -----  Results reviewed. X-ray shows no abnormality, how is his knee pain?

## 2023-03-29 NOTE — TELEPHONE ENCOUNTER
Pt notified and verbalized understanding. ENDO: [phone]. Pt states his knee pain is improving but is still hurting. He rates the pain as a 5/10 whereas before it was a 9/10. Please advise.

## 2023-03-30 NOTE — TELEPHONE ENCOUNTER
[phone] for appointment scheduling.    [facility], Primary Care   [address], [address]   [place] [address] [phone]    [facility] Internal Medicine & Endocrinology  [address], [address]  [place] [address] [phone]    [facility] Family Medicine  [address], [address]   [place] [address] [phone]    [facility] Primary Care  [address], [address]  [place] [address] [phone]    [facility], Primary Care,   [address], [address]   [place] [address] [phone]    [facility] Primary Care  [address]  [place] [id number] [phone]    [facility], Primary Care  [address], [address]  [place] [address] [phone]    [facility] Internal Medicine & Pediatrics  [address], [address]   [place] [address] [phone]    [facility], Primary Care  [address] [place] [address] [phone]      [facility] Primary Care  [address], [address]   [place] [address] [phone]    [facility] Family Medicine  [address] [place] [address] [phone]                 LMTCB

## 2023-07-19 ENCOUNTER — OFFICE VISIT (OUTPATIENT)
Facility: CLINIC | Age: 68
End: 2023-07-19
Payer: COMMERCIAL

## 2023-07-19 VITALS
OXYGEN SATURATION: 98 % | BODY MASS INDEX: 30 KG/M2 | WEIGHT: 216 LBS | HEART RATE: 76 BPM | DIASTOLIC BLOOD PRESSURE: 78 MMHG | SYSTOLIC BLOOD PRESSURE: 120 MMHG

## 2023-07-19 DIAGNOSIS — E04.9 GOITER: ICD-10-CM

## 2023-07-19 DIAGNOSIS — E05.90 THYROTOXICOSIS WITHOUT THYROID STORM, UNSPECIFIED THYROTOXICOSIS TYPE: Primary | ICD-10-CM

## 2023-07-19 PROCEDURE — 3078F DIAST BP <80 MM HG: CPT

## 2023-07-19 PROCEDURE — 3074F SYST BP LT 130 MM HG: CPT

## 2023-07-19 PROCEDURE — 99215 OFFICE O/P EST HI 40 MIN: CPT

## 2023-07-19 NOTE — PATIENT INSTRUCTIONS
PLAN:  - HOLD your biotin/multivitamin for at least the next 3-4 days before getting your blood drawn  - Lab tests: TSH (brain signal), free T4, total T3 (hormones you \"feel\")  -  TSI (this will check for autoimmune / Graves)  - Complete 1 time thyroid ultrasound  - Based on labs, we will determine next steps    Differential:   - Biotin induced lab assay error  - Graves disease (autoimmune)  - Amiodarone induced thyrotoxicosis    Please submit to me your lab and ultrasound results when you receive them via [employer].  You can also have them faxed:  Office fax number: [phone]

## 2023-08-18 ENCOUNTER — TELEPHONE (OUTPATIENT)
Facility: CLINIC | Age: 68
End: 2023-08-18

## 2023-08-18 DIAGNOSIS — T46.2X5A HYPERTHYROIDISM DUE TO AMIODARONE: Primary | ICD-10-CM

## 2023-08-18 DIAGNOSIS — E05.00 GRAVES DISEASE: ICD-10-CM

## 2023-08-18 DIAGNOSIS — E05.80 HYPERTHYROIDISM DUE TO AMIODARONE: Primary | ICD-10-CM

## 2023-08-18 RX ORDER — METHIMAZOLE 10 MG/1
30 TABLET ORAL 3 TIMES DAILY
Qty: 270 TABLET | Refills: 1 | Status: SHIPPED | OUTPATIENT
Start: 2023-08-18

## 2023-08-18 NOTE — TELEPHONE ENCOUNTER
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

## 2023-08-18 NOTE — TELEPHONE ENCOUNTER
Patient aware of lab results and of Eloina's orders/response. Pt states he feels \"fine\". Pt verbalized understanding of all Eloina's instructions. Pt is to go to the [facility] lab location and have the CMP and CBC done \"tomorrow\". Pt. will hold off on getting thyroid labs done until a month after starting methimazole. Patient to monitor for side effects of skin rash and joint pain. [facility] lab address and Saturday lab hours provided to the pt.

## 2023-08-18 NOTE — TELEPHONE ENCOUNTER
[facility] thryoid labs. Given to RN to review.

## 2023-08-18 NOTE — TELEPHONE ENCOUNTER
RN phoned patient; patient's name appearing in overdue lab result \"basket\". Labs overdue: TSH, T3 and   Free T4, ordered 7/19/23. LOV-7/19/23  RTC- 8/30/23    Patient stated he already had them drawn after his July appointment w/ Eloina; were drawn at the [facility].  located on [address] in [place]. Lab results not in the system; RN will phone [facility] and request for results to be faxed to Endo office.

## 2023-08-18 NOTE — TELEPHONE ENCOUNTER
3/21/23- last thyroid results; labs ordered per Dr. Tonie Ribera  TSH- <0.005  Free T4- 2.4    Fax from [employer]. received. Thyroid lab results from 7/29/23 draw (pt .held Biotin for 3 days prior to lab draw) put in [address] for review    Patient stated he did not get the Thyroid ultrasound done yet. Message routed to [facility].

## 2023-08-19 ENCOUNTER — LAB ENCOUNTER (OUTPATIENT)
Dept: LAB | Age: 68
End: 2023-08-19
Payer: COMMERCIAL

## 2023-08-19 DIAGNOSIS — T46.2X5A HYPERTHYROIDISM DUE TO AMIODARONE: ICD-10-CM

## 2023-08-19 DIAGNOSIS — E05.80 HYPERTHYROIDISM DUE TO AMIODARONE: ICD-10-CM

## 2023-08-19 LAB
ALBUMIN SERPL-MCNC: 3.8 G/DL (ref 3.4–5)
ALBUMIN/GLOB SERPL: 1.1 {RATIO} (ref 1–2)
ALP LIVER SERPL-CCNC: 68 U/L
ALT SERPL-CCNC: 31 U/L
ANION GAP SERPL CALC-SCNC: 6 MMOL/L (ref 0–18)
AST SERPL-CCNC: 23 U/L (ref 15–37)
BASOPHILS # BLD AUTO: 0.03 X10(3) UL (ref 0–0.2)
BASOPHILS NFR BLD AUTO: 0.8 %
BILIRUB SERPL-MCNC: 1.2 MG/DL (ref 0.1–2)
BUN BLD-MCNC: 12 MG/DL (ref 7–18)
CALCIUM BLD-MCNC: 9.4 MG/DL (ref 8.5–10.1)
CHLORIDE SERPL-SCNC: 109 MMOL/L (ref 98–112)
CO2 SERPL-SCNC: 22 MMOL/L (ref 21–32)
CREAT BLD-MCNC: 1.14 MG/DL
EGFRCR SERPLBLD CKD-EPI 2021: 70 ML/MIN/1.73M2 (ref 60–?)
EOSINOPHIL # BLD AUTO: 0.14 X10(3) UL (ref 0–0.7)
EOSINOPHIL NFR BLD AUTO: 3.7 %
ERYTHROCYTE [DISTWIDTH] IN BLOOD BY AUTOMATED COUNT: 12.3 %
FASTING STATUS PATIENT QL REPORTED: NO
GLOBULIN PLAS-MCNC: 3.6 G/DL (ref 2.8–4.4)
GLUCOSE BLD-MCNC: 104 MG/DL (ref 70–99)
HCT VFR BLD AUTO: 42.8 %
HGB BLD-MCNC: 14.5 G/DL
IMM GRANULOCYTES # BLD AUTO: 0.01 X10(3) UL (ref 0–1)
IMM GRANULOCYTES NFR BLD: 0.3 %
LYMPHOCYTES # BLD AUTO: 0.96 X10(3) UL (ref 1–4)
LYMPHOCYTES NFR BLD AUTO: 25.5 %
MCH RBC QN AUTO: 30 PG (ref 26–34)
MCHC RBC AUTO-ENTMCNC: 33.9 G/DL (ref 31–37)
MCV RBC AUTO: 88.6 FL
MONOCYTES # BLD AUTO: 0.42 X10(3) UL (ref 0.1–1)
MONOCYTES NFR BLD AUTO: 11.2 %
NEUTROPHILS # BLD AUTO: 2.2 X10 (3) UL (ref 1.5–7.7)
NEUTROPHILS # BLD AUTO: 2.2 X10(3) UL (ref 1.5–7.7)
NEUTROPHILS NFR BLD AUTO: 58.5 %
OSMOLALITY SERPL CALC.SUM OF ELEC: 284 MOSM/KG (ref 275–295)
PLATELET # BLD AUTO: 155 10(3)UL (ref 150–450)
POTASSIUM SERPL-SCNC: 4.1 MMOL/L (ref 3.5–5.1)
PROT SERPL-MCNC: 7.4 G/DL (ref 6.4–8.2)
RBC # BLD AUTO: 4.83 X10(6)UL
SODIUM SERPL-SCNC: 137 MMOL/L (ref 136–145)
T3 SERPL-MCNC: 187 NG/DL (ref 60–181)
T4 FREE SERPL-MCNC: 3.6 NG/DL (ref 0.8–1.7)
TSI SER-ACNC: <0.005 MIU/ML (ref 0.36–3.74)
WBC # BLD AUTO: 3.8 X10(3) UL (ref 4–11)

## 2023-08-22 ENCOUNTER — MED REC SCAN ONLY (OUTPATIENT)
Facility: CLINIC | Age: 68
End: 2023-08-22

## 2023-08-23 LAB — THY STIM IMMUNO: 3.96 IU/L

## 2023-09-17 DIAGNOSIS — T46.2X5A HYPERTHYROIDISM DUE TO AMIODARONE: Primary | ICD-10-CM

## 2023-09-17 DIAGNOSIS — E05.80 HYPERTHYROIDISM DUE TO AMIODARONE: Primary | ICD-10-CM

## 2023-09-18 ENCOUNTER — HOSPITAL ENCOUNTER (OUTPATIENT)
Dept: ULTRASOUND IMAGING | Age: 68
Discharge: HOME | End: 2023-09-18
Payer: COMMERCIAL

## 2023-09-18 DIAGNOSIS — E05.90 THYROTOXICOSIS WITHOUT THYROID STORM, UNSPECIFIED THYROTOXICOSIS TYPE: ICD-10-CM

## 2023-09-18 PROCEDURE — 76536 US EXAM OF HEAD AND NECK: CPT

## 2023-09-18 RX ORDER — METHIMAZOLE 10 MG/1
30 TABLET ORAL 3 TIMES DAILY
Qty: 270 TABLET | Refills: 0 | OUTPATIENT
Start: 2023-09-18

## 2023-09-18 NOTE — TELEPHONE ENCOUNTER
Phoned patient 9/19/23. States he has been taking MMI 10mg TID as instructed via phone 8/18 (total of 30mg daily). States he is having some fatigue and joint pain. Is agreeable to repeat labs before visit 10/2 (missed Aug f/u). Will add on CBC/CMP for monitoring.  Will close this encounter

## 2023-09-18 NOTE — TELEPHONE ENCOUNTER
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 Phoned patient and schedule f/u appointment with Eloina BARRERA on 10/2/23 and asked to get labs done the Friday or Saturday before that visit. Patient verbalized understanding and states will do. Lab orders already in system.

## 2023-09-19 RX ORDER — METHIMAZOLE 10 MG/1
30 TABLET ORAL DAILY
COMMUNITY
Start: 2023-09-19

## 2023-09-30 ENCOUNTER — LAB ENCOUNTER (OUTPATIENT)
Dept: LAB | Age: 68
End: 2023-09-30
Payer: COMMERCIAL

## 2023-09-30 DIAGNOSIS — E05.80 HYPERTHYROIDISM DUE TO AMIODARONE: ICD-10-CM

## 2023-09-30 DIAGNOSIS — T46.2X5A HYPERTHYROIDISM DUE TO AMIODARONE: ICD-10-CM

## 2023-09-30 LAB
ALBUMIN SERPL-MCNC: 3.4 G/DL (ref 3.4–5)
ALBUMIN/GLOB SERPL: 0.9 {RATIO} (ref 1–2)
ALP LIVER SERPL-CCNC: 71 U/L
ALT SERPL-CCNC: 25 U/L
ANION GAP SERPL CALC-SCNC: 10 MMOL/L (ref 0–18)
AST SERPL-CCNC: 22 U/L (ref 15–37)
BASOPHILS # BLD AUTO: 0.02 X10(3) UL (ref 0–0.2)
BASOPHILS NFR BLD AUTO: 0.6 %
BILIRUB SERPL-MCNC: 0.7 MG/DL (ref 0.1–2)
BUN BLD-MCNC: 12 MG/DL (ref 7–18)
CALCIUM BLD-MCNC: 9 MG/DL (ref 8.5–10.1)
CHLORIDE SERPL-SCNC: 112 MMOL/L (ref 98–112)
CO2 SERPL-SCNC: 21 MMOL/L (ref 21–32)
CREAT BLD-MCNC: 0.95 MG/DL
EGFRCR SERPLBLD CKD-EPI 2021: 88 ML/MIN/1.73M2 (ref 60–?)
EOSINOPHIL # BLD AUTO: 0.13 X10(3) UL (ref 0–0.7)
EOSINOPHIL NFR BLD AUTO: 4.2 %
ERYTHROCYTE [DISTWIDTH] IN BLOOD BY AUTOMATED COUNT: 12 %
FASTING STATUS PATIENT QL REPORTED: YES
GLOBULIN PLAS-MCNC: 3.6 G/DL (ref 2.8–4.4)
GLUCOSE BLD-MCNC: 115 MG/DL (ref 70–99)
HCT VFR BLD AUTO: 40.7 %
HGB BLD-MCNC: 13.5 G/DL
IMM GRANULOCYTES # BLD AUTO: 0.01 X10(3) UL (ref 0–1)
IMM GRANULOCYTES NFR BLD: 0.3 %
LYMPHOCYTES # BLD AUTO: 0.74 X10(3) UL (ref 1–4)
LYMPHOCYTES NFR BLD AUTO: 24 %
MCH RBC QN AUTO: 30 PG (ref 26–34)
MCHC RBC AUTO-ENTMCNC: 33.2 G/DL (ref 31–37)
MCV RBC AUTO: 90.4 FL
MONOCYTES # BLD AUTO: 0.33 X10(3) UL (ref 0.1–1)
MONOCYTES NFR BLD AUTO: 10.7 %
NEUTROPHILS # BLD AUTO: 1.85 X10 (3) UL (ref 1.5–7.7)
NEUTROPHILS # BLD AUTO: 1.85 X10(3) UL (ref 1.5–7.7)
NEUTROPHILS NFR BLD AUTO: 60.2 %
OSMOLALITY SERPL CALC.SUM OF ELEC: 297 MOSM/KG (ref 275–295)
PLATELET # BLD AUTO: 133 10(3)UL (ref 150–450)
POTASSIUM SERPL-SCNC: 4.2 MMOL/L (ref 3.5–5.1)
PROT SERPL-MCNC: 7 G/DL (ref 6.4–8.2)
RBC # BLD AUTO: 4.5 X10(6)UL
SODIUM SERPL-SCNC: 143 MMOL/L (ref 136–145)
T3 SERPL-MCNC: 187 NG/DL (ref 60–181)
T4 FREE SERPL-MCNC: 2.9 NG/DL (ref 0.8–1.7)
TSI SER-ACNC: <0.005 MIU/ML (ref 0.36–3.74)
WBC # BLD AUTO: 3.1 X10(3) UL (ref 4–11)

## 2023-09-30 PROCEDURE — 80053 COMPREHEN METABOLIC PANEL: CPT

## 2023-09-30 PROCEDURE — 85025 COMPLETE CBC W/AUTO DIFF WBC: CPT

## 2023-09-30 PROCEDURE — 84443 ASSAY THYROID STIM HORMONE: CPT

## 2023-09-30 PROCEDURE — 84439 ASSAY OF FREE THYROXINE: CPT

## 2023-09-30 PROCEDURE — 84445 ASSAY OF TSI GLOBULIN: CPT

## 2023-09-30 PROCEDURE — 84480 ASSAY TRIIODOTHYRONINE (T3): CPT

## 2023-10-02 ENCOUNTER — OFFICE VISIT (OUTPATIENT)
Facility: CLINIC | Age: 68
End: 2023-10-02
Payer: COMMERCIAL

## 2023-10-02 VITALS — SYSTOLIC BLOOD PRESSURE: 128 MMHG | OXYGEN SATURATION: 98 % | HEART RATE: 66 BPM | DIASTOLIC BLOOD PRESSURE: 84 MMHG

## 2023-10-02 DIAGNOSIS — E05.80 HYPERTHYROIDISM DUE TO AMIODARONE: Primary | ICD-10-CM

## 2023-10-02 DIAGNOSIS — T46.2X5A HYPERTHYROIDISM DUE TO AMIODARONE: Primary | ICD-10-CM

## 2023-10-02 PROCEDURE — 3074F SYST BP LT 130 MM HG: CPT

## 2023-10-02 PROCEDURE — 99214 OFFICE O/P EST MOD 30 MIN: CPT

## 2023-10-02 PROCEDURE — 3079F DIAST BP 80-89 MM HG: CPT

## 2023-10-02 RX ORDER — PREDNISONE 5 MG/1
TABLET ORAL
Qty: 50 TABLET | Refills: 0 | Status: SHIPPED | OUTPATIENT
Start: 2023-10-02 | End: 2023-10-22

## 2023-10-02 RX ORDER — METHIMAZOLE 10 MG/1
15 TABLET ORAL 2 TIMES DAILY
Qty: 270 TABLET | COMMUNITY
Start: 2023-10-02

## 2023-10-02 NOTE — PROGRESS NOTES
opEndocrinology Clinic Note    Name: Connie Cullen    Date: 10/2/2023    HISTORY OF PRESENT ILLNESS   Connie Cullen is a 80year old male with PMHx significant for atrial fibrillation dx'd in July 2022, HTN, CAD who presents for consultation for thyrotoxicosis    Initial HPI consult in July 2023  Thyroid hx:  (-) Fhx of thyroid disease   Nov 2020, TSH: <0.005, fT4: 3.07  Was instructed to f/u with endo services, never did  Dx'd with afib 2 years ago on a work physical, no s/p cardioversion and on amiodarone  Works as a  and farmer, has felt absolutely great over the last few years and self-describes as generally healthy w/ no issues which is why he delayed seeking care for abnormal thyroid tests  No known fam hx of autoimmunity    Nov 2020, TSH: <0.005, fT4: 3.07  July 2023: TSH - <0.005, fT4 - 2.40    Pt endorses: amiodarone use and biotin use (taking Centrum MVI w/ 100% biotin daily)    Pt denies: fatigue/weakness, temperature intolerance, tremor, palpitations, vision changes, hair loss, hyperdefectation, dysphagia, and unexplained weight loss recent illness, weight loss medication use, and recent IV contrast for imaging      Interim hx:  Oct 2023-  Repeat TFTs --   TSH <0.005, fT4  2.90, total T3 189  Feeling generally well, noting some joint pain but has improved, some fatigue  On MMI 30mg daily, taking 10mg TID and hoping to change to BID dosing  Follows with Dr. Allan Ram with [facility] Card[facility]- they want to consider ablation and stopping amiodarone, he has not discussed his hyperTH diagnosis with cards yet    Nov 2020: TSH: <0.005, fT4: 3.07  July 2023: TSH - <0.005, fT4 - 2.40  Aug 2023: TSH <0.005, fT4: 3.60, total T3 187, TSI 3.96  Sept 2023: TSH <0.005, fT4: 2.90, total T3 187      REVIEW OF SYSTEMS  Ten point review of systems has been performed and is otherwise negative and/or non-contributory, except as described above.     Medications:     Current Outpatient Medications: methIMAzole 10 MG Oral Tab, Take 1.5 tablets (15 mg total) by mouth in the morning and 1.5 tablets (15 mg total) before bedtime. , Disp: 270 tablet, Rfl:     predniSONE 5 MG Oral Tab, Take 4 tablets (20 mg total) by mouth daily for 5 days, THEN 3 tablets (15 mg total) daily for 5 days, THEN 2 tablets (10 mg total) daily for 5 days, THEN 1 tablet (5 mg total) daily for 5 days. , Disp: 50 tablet, Rfl: 0    amiodarone 200 MG Oral Tab, 1 tablet (200 mg total) every morning., Disp: , Rfl:     apixaban 5 MG Oral Tab, Take 1 tablet (5 mg total) by mouth 2 (two) times daily. , Disp: , Rfl:     enalapril 5 MG Oral Tab, Take 1 tablet (5 mg total) by mouth daily. , Disp: 30 tablet, Rfl: 0    OMEPRAZOLE 20 MG Oral Capsule Delayed Release, TAKE 1 CAPSULE BY MOUTH EVERY DAY, Disp: 90 capsule, Rfl: 0    Multiple Vitamins-Minerals (ONE-A-DAY MENS 50+ ADVANTAGE) Oral Tab, Take 1 tablet by mouth daily. , Disp: , Rfl:     Rosuvastatin Calcium 10 MG Oral Tab, Take 1 tablet (10 mg total) by mouth nightly., Disp: , Rfl:     aspirin 81 MG Oral Chew Tab, Chew 2 tablets (162 mg total) by mouth daily. , Disp: , Rfl:      Allergies:   No Known Allergies    Social History:   Social History    Socioeconomic History      Marital status: Single    Tobacco Use      Smoking status: Never      Smokeless tobacco: Never    Vaping Use      Vaping Use: Never used    Substance and Sexual Activity      Alcohol use: Yes        Comment: social      Drug use: Never    Other Topics      Concerns:        Caffeine Concern: Yes          2 cups of coffee every morning and sometimes pop during the day.          Exercise: Yes          Patient is a farmer      Medical History:   Past Medical History:   Diagnosis Date    Essential hypertension     High blood pressure     High cholesterol     Hyperlipidemia          Surgical history:   Past Surgical History:   Procedure Laterality Date    COLONOSCOPY      OTHER      right shoulder        PHYSICAL EXAMINATION:  /84 Pulse 66   SpO2 98%     CONSTITUTIONAL:  awake, alert, cooperative, no apparent distress, and appears stated age  PSYCH: normal affect  EYES:  No proptosis,  conjunctiva normal, no lid lag or stare  ENT:  Normocephalic, atraumatic  NECK:  Symmetrical diffusely enlarged thyroid gland, non tender  LUNGS: breathing comfortably  ABDOMEN:  soft  SKIN:  no rashes and no lesions  EXTREMITIES: no edema    Labs:  Lab Results   Component Value Date    T4F 2.9 (H) 09/30/2023    TSH <0.005 (L) 09/30/2023      Recent Labs     03/21/23  1527 08/19/23  0900 09/30/23  0743   T4F 2.4* 3.6* 2.9*   TSH <0.005* <0.005* <0.005*        Imaging:  Sept 2023 ultrasound:   Narrative   PROCEDURE:  US THYROID (IQS=[id number])     COMPARISON:  None. INDICATIONS:  E05.90 Thyrotoxicosis without thyroid storm, unspecified thyrotoxicosis type     TECHNIQUE:  High-resolution ultrasound of the thyroid gland was performed. PATIENT STATED HISTORY: (As transcribed by Technologist)  Thyrotoxicosis         FINDINGS:       MEASUREMENTS:  RIGHT LOBE:  6.5 x 3.9 x 3.8 cm  LEFT LOBE:  6.4 x 3.5 x 2.6 cm  ISTHMUS:  1.1 cm     NODULES:  None. OTHER:  None. Impression   CONCLUSION:  TR1 - Benign (No FNA). Overall enlarged thyroid gland. ACR TI-RADS recommendations:  TR5 - FNA if greater than or equal to 1 cm, follow-up if 0.5-0.9 cm every year for 5 years. TR4 - FNA if greater than or equal to 1.5 cm, follow-up if 1-1.4 cm in 1, 2, 3 and 5 years. TR3 - FNA if greater than or equal to 2.5 cm, follow-up if 1.5-2.4 cm in 1, 3 and 5 years  TR1 and TR2 - no FNA or follow-up     Please note ACR TI-RADS recommendations are based on imaging features and size of the nodule only. In certain clinical circumstances additional or alternative evaluation may be indicated.         LOCATION:  [place] [address]             ASSESSMENT/PLAN:  (E05.90) Thyrotoxicosis without thyroid storm, unspecified thyrotoxicosis type  (primary encounter diagnosis)  Plan: TSH+FREE T4, TRIIODOTHYRONINE (T3) TOTAL,         THYROID STIMULATING IMMUNOGLOB, US THYROID         (LEG=[id number])    Biochemically thyrotoxic; (+)TSI antibodies. So far has completed one month of MMI with mild improvement in TFTs however still thyrotoxic. Given +Ab and amiodarone use this is likely type 1 AIT however cannot r/o type 2 AIT. Will attempt 1x steroid burst to eval if improvement in TFTs seen; it is possible to have a mixture of both mechanisms. - continue MMI 15mg BID (1.5 tab qAM, 1.5 tab qPM), trend TFTs  - start prednisone 20mg x 5 days, 15 mg x 5 days, 10 mgx 5 days, 5 mg x 5 days  - Labs in one month (after 11/2): TSH, free T4, total T3, TSI  - counseled on potential SE of MMI  - must contact clinic if amiodarone dosing changes  - Pathophysiology of condition, goals of therapy,  d/w pt in detail.   - hold on uptake & scan d/t pt on amiodarone  - no opthalmopathy on exam however ophthalmology referral placed for baseline given +TSI     Return in about 2 months (around 12/2/2023) for thyroid follow up. A total of 30 minutes was spent today on obtaining history, reviewing pertinent labs, evaluating patient, providing multiple treatment options, reinforcing diet/exercise and compliance, and completing documentation.      10/2/2023  BRUCE Cope

## 2023-10-02 NOTE — PATIENT INSTRUCTIONS
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 Madelyn Pavon in [place] is an ophthalmologist we recommend near you    MEDICATIONS:   - split dose of methimazole differently - take 15mg every morning and 15mg every night (1.5 tablets, twice daily)   - start prednisone (tapered down)- we will see if this helps decrease the over active thyroid function    Take prednisone 20mg (4 tablets) for five days  Then, decrease: take prednisone 15mg (3 tablets) for 5 days  Then, decrease: Take prednisone 10mg (2 tablets) for 5 days  Then, decrease: Take prednisone 5mg (1 tablet) for 5 days    --> side effects of methimazole include: Up to 15 percent of people who take an antithyroid drug have minor side effects. Both methimazole and PTU can cause itching, rash, hives, joint pain and swelling, fever, changes in taste, nausea, and vomiting.    Please let me know if you develop yellowing of the skin, a very bad sore throat or fever  --> If anything ever changes with your amiodarone dose, it is very important that you let me know your dose has changed     Understanding thyroid labs:  - TSH = thyroid stimulating hormone, this is the 'signal' which turns UP or DOWN depending on how much hormone your thyroid gland is producing.    - free T4, total T3 = the thyroid hormones in your blood, this is the thyroid number you can \"Feel\"- if T4 is low, that indicates hypothyroidism, if T4 is high, that indicates hyperthyroidism      [facility] - [place] BRUCE Alexis  Office phone number: [phone]  Fax number: [phone]

## 2023-10-04 LAB — THY STIM IMMUNO: 4.75 IU/L

## 2023-12-16 ENCOUNTER — LAB ENCOUNTER (OUTPATIENT)
Dept: LAB | Age: 68
End: 2023-12-16
Payer: COMMERCIAL

## 2023-12-16 DIAGNOSIS — E05.80 HYPERTHYROIDISM DUE TO AMIODARONE: ICD-10-CM

## 2023-12-16 DIAGNOSIS — T46.2X5A HYPERTHYROIDISM DUE TO AMIODARONE: ICD-10-CM

## 2023-12-16 LAB
T3 SERPL-MCNC: 144 NG/DL (ref 60–181)
T4 FREE SERPL-MCNC: 1.8 NG/DL (ref 0.8–1.7)
TSI SER-ACNC: <0.005 MIU/ML (ref 0.36–3.74)

## 2023-12-16 PROCEDURE — 84443 ASSAY THYROID STIM HORMONE: CPT

## 2023-12-16 PROCEDURE — 84480 ASSAY TRIIODOTHYRONINE (T3): CPT

## 2023-12-16 PROCEDURE — 84445 ASSAY OF TSI GLOBULIN: CPT

## 2023-12-16 PROCEDURE — 84439 ASSAY OF FREE THYROXINE: CPT

## 2023-12-20 LAB — THY STIM IMMUNO: 2.7 IU/L

## 2023-12-20 NOTE — IMAGING NOTE
Call placed to pt regarding CTA Gated thoracic. Instructed to arrive at 1345. May eat a light breakfast/lunch but drink plenty of fluids a day before and morning of procedure. .   Advised to hold caffeine 12 hrs prior to procedure. May take usual meds.

## 2023-12-22 ENCOUNTER — HOSPITAL ENCOUNTER (OUTPATIENT)
Dept: CT IMAGING | Facility: HOSPITAL | Age: 68
Discharge: HOME | End: 2023-12-22
Attending: INTERNAL MEDICINE
Payer: COMMERCIAL

## 2023-12-22 ENCOUNTER — TELEPHONE (OUTPATIENT)
Facility: CLINIC | Age: 68
End: 2023-12-22

## 2023-12-22 VITALS — DIASTOLIC BLOOD PRESSURE: 75 MMHG | HEART RATE: 65 BPM | SYSTOLIC BLOOD PRESSURE: 138 MMHG

## 2023-12-22 DIAGNOSIS — I48.0 PAROXYSMAL ATRIAL FIBRILLATION (HCC): ICD-10-CM

## 2023-12-22 LAB
CREAT BLD-MCNC: 0.8 MG/DL
EGFRCR SERPLBLD CKD-EPI 2021: 97 ML/MIN/1.73M2 (ref 60–?)

## 2023-12-22 PROCEDURE — 75574 CT ANGIO HRT W/3D IMAGE: CPT | Performed by: INTERNAL MEDICINE

## 2023-12-22 PROCEDURE — 82565 ASSAY OF CREATININE: CPT

## 2023-12-22 NOTE — TELEPHONE ENCOUNTER
Received fax in office from [facility] requesting at 90 day refill for patients MMI. Patient SIG written incorrectly- it says take 3 tablets (30 mg totatl) by mouth 3 times daily. Patient actually takes 30 mg total, 1.5 tablets (15mg) in the morning and 1.5 (15mg) in the evening. Pharmacist confirms that patient picked up a supply for 270 on 11/30- this is a 90 day refill. When patient is in need of refill, a new prescription will have to be sent. RN phoned patient to confirm he was taking the correct do- patient confirms and will be in the office 12/28. Refill request shredded.

## 2023-12-22 NOTE — IMAGING NOTE
Jordan Both to CT Rm 4. Positioned pt on table. Procedure explained and questions answered. Vital signs monitored and noted in Flowsheet. GFR = 97  Contrast injected followed by saline flush at 14:09  Contrast = 90ml  0.9 NS flush = 75ml  Average HR = 59 BPM    Patient tolerated the procedure without complication. Denies any contrast reaction. Discontinued IV saline lock. Escorted pt to [facility] Dressing Room and discharged in stable condition.

## 2023-12-23 ENCOUNTER — LAB ENCOUNTER (OUTPATIENT)
Dept: LAB | Age: 68
End: 2023-12-23
Attending: INTERNAL MEDICINE
Payer: COMMERCIAL

## 2023-12-23 DIAGNOSIS — Z01.812 PRE-OPERATIVE LABORATORY EXAMINATION: Primary | ICD-10-CM

## 2023-12-23 LAB
ANION GAP SERPL CALC-SCNC: 6 MMOL/L (ref 0–18)
BASOPHILS # BLD AUTO: 0.02 X10(3) UL (ref 0–0.2)
BASOPHILS NFR BLD AUTO: 0.4 %
BUN BLD-MCNC: 11 MG/DL (ref 9–23)
CALCIUM BLD-MCNC: 9.4 MG/DL (ref 8.5–10.1)
CHLORIDE SERPL-SCNC: 109 MMOL/L (ref 98–112)
CO2 SERPL-SCNC: 25 MMOL/L (ref 21–32)
CREAT BLD-MCNC: 0.91 MG/DL
EGFRCR SERPLBLD CKD-EPI 2021: 92 ML/MIN/1.73M2 (ref 60–?)
EOSINOPHIL # BLD AUTO: 0.17 X10(3) UL (ref 0–0.7)
EOSINOPHIL NFR BLD AUTO: 3.6 %
ERYTHROCYTE [DISTWIDTH] IN BLOOD BY AUTOMATED COUNT: 12.4 %
FASTING STATUS PATIENT QL REPORTED: YES
GLUCOSE BLD-MCNC: 100 MG/DL (ref 70–99)
HCT VFR BLD AUTO: 42.6 %
HGB BLD-MCNC: 14.4 G/DL
IMM GRANULOCYTES # BLD AUTO: 0.01 X10(3) UL (ref 0–1)
IMM GRANULOCYTES NFR BLD: 0.2 %
LYMPHOCYTES # BLD AUTO: 1.04 X10(3) UL (ref 1–4)
LYMPHOCYTES NFR BLD AUTO: 22 %
MCH RBC QN AUTO: 29 PG (ref 26–34)
MCHC RBC AUTO-ENTMCNC: 33.8 G/DL (ref 31–37)
MCV RBC AUTO: 85.9 FL
MONOCYTES # BLD AUTO: 0.42 X10(3) UL (ref 0.1–1)
MONOCYTES NFR BLD AUTO: 8.9 %
NEUTROPHILS # BLD AUTO: 3.07 X10 (3) UL (ref 1.5–7.7)
NEUTROPHILS # BLD AUTO: 3.07 X10(3) UL (ref 1.5–7.7)
NEUTROPHILS NFR BLD AUTO: 64.9 %
OSMOLALITY SERPL CALC.SUM OF ELEC: 289 MOSM/KG (ref 275–295)
PLATELET # BLD AUTO: 175 10(3)UL (ref 150–450)
POTASSIUM SERPL-SCNC: 4.3 MMOL/L (ref 3.5–5.1)
RBC # BLD AUTO: 4.96 X10(6)UL
SODIUM SERPL-SCNC: 140 MMOL/L (ref 136–145)
WBC # BLD AUTO: 4.7 X10(3) UL (ref 4–11)

## 2023-12-23 PROCEDURE — 36415 COLL VENOUS BLD VENIPUNCTURE: CPT

## 2023-12-23 PROCEDURE — 80048 BASIC METABOLIC PNL TOTAL CA: CPT

## 2023-12-23 PROCEDURE — 85025 COMPLETE CBC W/AUTO DIFF WBC: CPT

## 2023-12-24 DIAGNOSIS — E05.80 HYPERTHYROIDISM DUE TO AMIODARONE: ICD-10-CM

## 2023-12-24 DIAGNOSIS — T46.2X5A HYPERTHYROIDISM DUE TO AMIODARONE: ICD-10-CM

## 2023-12-26 RX ORDER — METHIMAZOLE 10 MG/1
30 TABLET ORAL 3 TIMES DAILY
Qty: 270 TABLET | Refills: 1 | OUTPATIENT
Start: 2023-12-26

## 2023-12-26 NOTE — TELEPHONE ENCOUNTER
See 12/22/23 TE. Patient has office visit on 12/28/23 with Quincy BARRERA. Patient has medication. Refill denied.

## 2023-12-28 ENCOUNTER — OFFICE VISIT (OUTPATIENT)
Facility: CLINIC | Age: 68
End: 2023-12-28
Payer: COMMERCIAL

## 2023-12-28 VITALS — HEART RATE: 67 BPM | OXYGEN SATURATION: 98 % | DIASTOLIC BLOOD PRESSURE: 80 MMHG | SYSTOLIC BLOOD PRESSURE: 120 MMHG

## 2023-12-28 DIAGNOSIS — R73.01 ELEVATED FASTING GLUCOSE: ICD-10-CM

## 2023-12-28 DIAGNOSIS — E05.90 THYROTOXICOSIS WITHOUT THYROID STORM, UNSPECIFIED THYROTOXICOSIS TYPE: ICD-10-CM

## 2023-12-28 DIAGNOSIS — E05.00 GRAVES DISEASE: Primary | ICD-10-CM

## 2023-12-28 LAB
CARTRIDGE LOT#: NORMAL NUMERIC
HEMOGLOBIN A1C: 5.2 % (ref 4.3–5.6)

## 2023-12-28 PROCEDURE — 3074F SYST BP LT 130 MM HG: CPT

## 2023-12-28 PROCEDURE — 99214 OFFICE O/P EST MOD 30 MIN: CPT

## 2023-12-28 PROCEDURE — 83036 HEMOGLOBIN GLYCOSYLATED A1C: CPT

## 2023-12-28 PROCEDURE — 3079F DIAST BP 80-89 MM HG: CPT

## 2023-12-28 NOTE — PATIENT INSTRUCTIONS
- continue methimazole 30mg daily  - repeat lab work after 1/27/24    Ophthalmologist - see and let them know you have Graves disease (longstanding; 3 years untreated) just to get baseline eye exam done    Local ophthalmologists who can complete diabetes dilated eye exam:  [facility]: [phone]  Dr. Nacho June (/wife who both practice)- [phone]  Dr. Eileen Matthews in [place]- [phone]    A1C 5.2% in office

## 2023-12-29 ENCOUNTER — TELEPHONE (OUTPATIENT)
Facility: CLINIC | Age: 68
End: 2023-12-29

## 2024-01-04 NOTE — PAT NURSING NOTE
PreOp Instructions     You are scheduled for: a Cardiac Procedure     Date of Procedure: 01/12/24. Check in at 6:00 am     Diet Instructions: Do not eat or drink anything for 8 hours before the procedure. No food,gum,candy or mints after 10:00 pm     Medications: Medications you are allowed to take can be taken with a sip of water the morning of your procedure. Take only amiodarone, aspirin and methimazole     Other Medications: LAST DOSE OF ELIQUIS 1/11 IN THE EVENING     Skin Prep: Shower with antibacterial soap using a clean washcloth, prior to procedure     Driving After Procedure: If sedation is given, you WILL NOT be able to drive home. You will need a responsible adult  to drive you home.     Discharge Teaching: Your nurse will give you specific instructions before discharge;Most people can resume normal activities in 2-3 days;Any questions, please call the physician's office

## 2024-01-12 ENCOUNTER — HOSPITAL ENCOUNTER (OUTPATIENT)
Dept: INTERVENTIONAL RADIOLOGY/VASCULAR | Facility: HOSPITAL | Age: 69
Discharge: HOME | End: 2024-01-12
Attending: INTERNAL MEDICINE | Admitting: INTERNAL MEDICINE
Payer: COMMERCIAL

## 2024-01-12 ENCOUNTER — ANESTHESIA (OUTPATIENT)
Dept: INTERVENTIONAL RADIOLOGY/VASCULAR | Facility: HOSPITAL | Age: 69
End: 2024-01-12
Payer: COMMERCIAL

## 2024-01-12 VITALS
DIASTOLIC BLOOD PRESSURE: 93 MMHG | OXYGEN SATURATION: 97 % | WEIGHT: 205 LBS | BODY MASS INDEX: 27.77 KG/M2 | RESPIRATION RATE: 25 BRPM | SYSTOLIC BLOOD PRESSURE: 168 MMHG | TEMPERATURE: 98 F | HEART RATE: 85 BPM | HEIGHT: 72 IN

## 2024-01-12 DIAGNOSIS — I48.91 A-FIB (HCC): ICD-10-CM

## 2024-01-12 LAB
ATRIAL RATE: 66 BPM
ISTAT ACTIVATED CLOTTING TIME: 282 SECONDS (ref 74–137)
ISTAT ACTIVATED CLOTTING TIME: 304 SECONDS (ref 74–137)
ISTAT ACTIVATED CLOTTING TIME: 341 SECONDS (ref 74–137)
P AXIS: 62 DEGREES
P-R INTERVAL: 228 MS
Q-T INTERVAL: 414 MS
QRS DURATION: 94 MS
QTC CALCULATION (BEZET): 434 MS
R AXIS: 29 DEGREES
T AXIS: 56 DEGREES
VENTRICULAR RATE: 66 BPM

## 2024-01-12 PROCEDURE — 85347 COAGULATION TIME ACTIVATED: CPT

## 2024-01-12 PROCEDURE — 93005 ELECTROCARDIOGRAM TRACING: CPT

## 2024-01-12 PROCEDURE — 4A0234Z MEASUREMENT OF CARDIAC ELECTRICAL ACTIVITY, PERCUTANEOUS APPROACH: ICD-10-PCS | Performed by: INTERNAL MEDICINE

## 2024-01-12 PROCEDURE — 93655 ICAR CATH ABLTJ DSCRT ARRHYT: CPT | Performed by: INTERNAL MEDICINE

## 2024-01-12 PROCEDURE — 93656 COMPRE EP EVAL ABLTJ ATR FIB: CPT | Performed by: INTERNAL MEDICINE

## 2024-01-12 PROCEDURE — 02K83ZZ MAP CONDUCTION MECHANISM, PERCUTANEOUS APPROACH: ICD-10-PCS | Performed by: INTERNAL MEDICINE

## 2024-01-12 PROCEDURE — 4A023FZ MEASUREMENT OF CARDIAC RHYTHM, PERCUTANEOUS APPROACH: ICD-10-PCS | Performed by: INTERNAL MEDICINE

## 2024-01-12 PROCEDURE — 93010 ELECTROCARDIOGRAM REPORT: CPT | Performed by: INTERNAL MEDICINE

## 2024-01-12 PROCEDURE — 02583ZZ DESTRUCTION OF CONDUCTION MECHANISM, PERCUTANEOUS APPROACH: ICD-10-PCS | Performed by: INTERNAL MEDICINE

## 2024-01-12 RX ORDER — SODIUM CHLORIDE 9 MG/ML
INJECTION, SOLUTION INTRAVENOUS CONTINUOUS
Status: DISCONTINUED | OUTPATIENT
Start: 2024-01-12 | End: 2024-01-12

## 2024-01-12 RX ORDER — MEPERIDINE HYDROCHLORIDE 25 MG/ML
12.5 INJECTION INTRAMUSCULAR; INTRAVENOUS; SUBCUTANEOUS AS NEEDED
Status: DISCONTINUED | OUTPATIENT
Start: 2024-01-12 | End: 2024-01-12

## 2024-01-12 RX ORDER — HYDROCODONE BITARTRATE AND ACETAMINOPHEN 5; 325 MG/1; MG/1
2 TABLET ORAL ONCE AS NEEDED
Status: DISCONTINUED | OUTPATIENT
Start: 2024-01-12 | End: 2024-01-12

## 2024-01-12 RX ORDER — DEXAMETHASONE SODIUM PHOSPHATE 4 MG/ML
VIAL (ML) INJECTION AS NEEDED
Status: DISCONTINUED | OUTPATIENT
Start: 2024-01-12 | End: 2024-01-12 | Stop reason: SURG

## 2024-01-12 RX ORDER — NALOXONE HYDROCHLORIDE 0.4 MG/ML
0.08 INJECTION, SOLUTION INTRAMUSCULAR; INTRAVENOUS; SUBCUTANEOUS AS NEEDED
Status: DISCONTINUED | OUTPATIENT
Start: 2024-01-12 | End: 2024-01-12

## 2024-01-12 RX ORDER — HEPARIN SODIUM 1000 [USP'U]/ML
INJECTION, SOLUTION INTRAVENOUS; SUBCUTANEOUS AS NEEDED
Status: DISCONTINUED | OUTPATIENT
Start: 2024-01-12 | End: 2024-01-12 | Stop reason: SURG

## 2024-01-12 RX ORDER — HYDROMORPHONE HYDROCHLORIDE 1 MG/ML
0.4 INJECTION, SOLUTION INTRAMUSCULAR; INTRAVENOUS; SUBCUTANEOUS EVERY 5 MIN PRN
Status: DISCONTINUED | OUTPATIENT
Start: 2024-01-12 | End: 2024-01-12

## 2024-01-12 RX ORDER — LIDOCAINE HYDROCHLORIDE 10 MG/ML
INJECTION, SOLUTION EPIDURAL; INFILTRATION; INTRACAUDAL; PERINEURAL
Status: DISCONTINUED
Start: 2024-01-12 | End: 2024-01-12 | Stop reason: WASHOUT

## 2024-01-12 RX ORDER — ONDANSETRON 2 MG/ML
4 INJECTION INTRAMUSCULAR; INTRAVENOUS EVERY 6 HOURS PRN
Status: DISCONTINUED | OUTPATIENT
Start: 2024-01-12 | End: 2024-01-12

## 2024-01-12 RX ORDER — METOCLOPRAMIDE HYDROCHLORIDE 5 MG/ML
10 INJECTION INTRAMUSCULAR; INTRAVENOUS EVERY 8 HOURS PRN
Status: DISCONTINUED | OUTPATIENT
Start: 2024-01-12 | End: 2024-01-12

## 2024-01-12 RX ORDER — SODIUM CHLORIDE, SODIUM LACTATE, POTASSIUM CHLORIDE, CALCIUM CHLORIDE 600; 310; 30; 20 MG/100ML; MG/100ML; MG/100ML; MG/100ML
INJECTION, SOLUTION INTRAVENOUS CONTINUOUS
Status: DISCONTINUED | OUTPATIENT
Start: 2024-01-12 | End: 2024-01-12

## 2024-01-12 RX ORDER — PROTAMINE SULFATE 10 MG/ML
INJECTION, SOLUTION INTRAVENOUS AS NEEDED
Status: DISCONTINUED | OUTPATIENT
Start: 2024-01-12 | End: 2024-01-12 | Stop reason: SURG

## 2024-01-12 RX ORDER — HYDROCODONE BITARTRATE AND ACETAMINOPHEN 5; 325 MG/1; MG/1
1 TABLET ORAL ONCE AS NEEDED
Status: DISCONTINUED | OUTPATIENT
Start: 2024-01-12 | End: 2024-01-12

## 2024-01-12 RX ORDER — DIPHENHYDRAMINE HYDROCHLORIDE 50 MG/ML
12.5 INJECTION INTRAMUSCULAR; INTRAVENOUS AS NEEDED
Status: DISCONTINUED | OUTPATIENT
Start: 2024-01-12 | End: 2024-01-12

## 2024-01-12 RX ORDER — HEPARIN SODIUM 5000 [USP'U]/ML
INJECTION, SOLUTION INTRAVENOUS; SUBCUTANEOUS
Status: COMPLETED
Start: 2024-01-12 | End: 2024-01-12

## 2024-01-12 RX ORDER — GLYCOPYRROLATE 0.2 MG/ML
INJECTION, SOLUTION INTRAMUSCULAR; INTRAVENOUS AS NEEDED
Status: DISCONTINUED | OUTPATIENT
Start: 2024-01-12 | End: 2024-01-12 | Stop reason: SURG

## 2024-01-12 RX ORDER — ACETAMINOPHEN 325 MG/1
650 TABLET ORAL EVERY 4 HOURS PRN
OUTPATIENT
Start: 2024-01-12

## 2024-01-12 RX ORDER — LABETALOL HYDROCHLORIDE 5 MG/ML
INJECTION, SOLUTION INTRAVENOUS AS NEEDED
Status: DISCONTINUED | OUTPATIENT
Start: 2024-01-12 | End: 2024-01-12 | Stop reason: SURG

## 2024-01-12 RX ORDER — HEPARIN SODIUM 1000 [USP'U]/ML
INJECTION, SOLUTION INTRAVENOUS; SUBCUTANEOUS
Status: COMPLETED
Start: 2024-01-12 | End: 2024-01-12

## 2024-01-12 RX ORDER — ACETAMINOPHEN AND CODEINE PHOSPHATE 300; 30 MG/1; MG/1
2 TABLET ORAL EVERY 4 HOURS PRN
OUTPATIENT
Start: 2024-01-12

## 2024-01-12 RX ORDER — SODIUM CHLORIDE 9 MG/ML
INJECTION, SOLUTION INTRAVENOUS CONTINUOUS
OUTPATIENT
Start: 2024-01-12

## 2024-01-12 RX ORDER — CHLORHEXIDINE GLUCONATE 4 G/100ML
30 SOLUTION TOPICAL
Status: DISCONTINUED | OUTPATIENT
Start: 2024-01-13 | End: 2024-01-12 | Stop reason: HOSPADM

## 2024-01-12 RX ORDER — HYDROMORPHONE HYDROCHLORIDE 1 MG/ML
0.2 INJECTION, SOLUTION INTRAMUSCULAR; INTRAVENOUS; SUBCUTANEOUS EVERY 5 MIN PRN
Status: DISCONTINUED | OUTPATIENT
Start: 2024-01-12 | End: 2024-01-12

## 2024-01-12 RX ORDER — IODIXANOL 320 MG/ML
100 INJECTION, SOLUTION INTRAVASCULAR
Status: DISCONTINUED | OUTPATIENT
Start: 2024-01-12 | End: 2024-01-12

## 2024-01-12 RX ORDER — NEOSTIGMINE METHYLSULFATE 1 MG/ML
INJECTION, SOLUTION INTRAVENOUS AS NEEDED
Status: DISCONTINUED | OUTPATIENT
Start: 2024-01-12 | End: 2024-01-12 | Stop reason: SURG

## 2024-01-12 RX ORDER — ROCURONIUM BROMIDE 10 MG/ML
INJECTION, SOLUTION INTRAVENOUS AS NEEDED
Status: DISCONTINUED | OUTPATIENT
Start: 2024-01-12 | End: 2024-01-12 | Stop reason: SURG

## 2024-01-12 RX ORDER — HYDROMORPHONE HYDROCHLORIDE 1 MG/ML
0.6 INJECTION, SOLUTION INTRAMUSCULAR; INTRAVENOUS; SUBCUTANEOUS EVERY 5 MIN PRN
Status: DISCONTINUED | OUTPATIENT
Start: 2024-01-12 | End: 2024-01-12

## 2024-01-12 RX ORDER — LIDOCAINE HYDROCHLORIDE 10 MG/ML
INJECTION, SOLUTION EPIDURAL; INFILTRATION; INTRACAUDAL; PERINEURAL
Status: COMPLETED
Start: 2024-01-12 | End: 2024-01-12

## 2024-01-12 RX ORDER — ONDANSETRON 2 MG/ML
INJECTION INTRAMUSCULAR; INTRAVENOUS AS NEEDED
Status: DISCONTINUED | OUTPATIENT
Start: 2024-01-12 | End: 2024-01-12 | Stop reason: SURG

## 2024-01-12 RX ORDER — MIDAZOLAM HYDROCHLORIDE 1 MG/ML
1 INJECTION INTRAMUSCULAR; INTRAVENOUS EVERY 5 MIN PRN
Status: DISCONTINUED | OUTPATIENT
Start: 2024-01-12 | End: 2024-01-12

## 2024-01-12 RX ORDER — ACETAMINOPHEN AND CODEINE PHOSPHATE 300; 30 MG/1; MG/1
1 TABLET ORAL EVERY 4 HOURS PRN
OUTPATIENT
Start: 2024-01-12

## 2024-01-12 RX ORDER — LIDOCAINE HYDROCHLORIDE 10 MG/ML
INJECTION, SOLUTION EPIDURAL; INFILTRATION; INTRACAUDAL; PERINEURAL AS NEEDED
Status: DISCONTINUED | OUTPATIENT
Start: 2024-01-12 | End: 2024-01-12 | Stop reason: SURG

## 2024-01-12 RX ORDER — ACETAMINOPHEN 500 MG
1000 TABLET ORAL ONCE AS NEEDED
Status: DISCONTINUED | OUTPATIENT
Start: 2024-01-12 | End: 2024-01-12

## 2024-01-12 RX ORDER — SODIUM CHLORIDE 9 MG/ML
INJECTION, SOLUTION INTRAVENOUS
Status: COMPLETED | OUTPATIENT
Start: 2024-01-13 | End: 2024-01-12

## 2024-01-12 RX ADMIN — GLYCOPYRROLATE 0.8 MG: 0.2 INJECTION, SOLUTION INTRAMUSCULAR; INTRAVENOUS at 10:03:00

## 2024-01-12 RX ADMIN — DEXAMETHASONE SODIUM PHOSPHATE 4 MG: 4 MG/ML VIAL (ML) INJECTION at 08:05:00

## 2024-01-12 RX ADMIN — LIDOCAINE HYDROCHLORIDE 50 MG: 10 INJECTION, SOLUTION EPIDURAL; INFILTRATION; INTRACAUDAL; PERINEURAL at 08:01:00

## 2024-01-12 RX ADMIN — LABETALOL HYDROCHLORIDE 2.5 MG: 5 INJECTION, SOLUTION INTRAVENOUS at 09:11:00

## 2024-01-12 RX ADMIN — ROCURONIUM BROMIDE 70 MG: 10 INJECTION, SOLUTION INTRAVENOUS at 08:01:00

## 2024-01-12 RX ADMIN — SODIUM CHLORIDE: 9 INJECTION, SOLUTION INTRAVENOUS at 07:55:00

## 2024-01-12 RX ADMIN — HEPARIN SODIUM 5000 UNITS: 1000 INJECTION, SOLUTION INTRAVENOUS; SUBCUTANEOUS at 09:14:00

## 2024-01-12 RX ADMIN — ROCURONIUM BROMIDE 10 MG: 10 INJECTION, SOLUTION INTRAVENOUS at 09:09:00

## 2024-01-12 RX ADMIN — HEPARIN SODIUM 15000 UNITS: 1000 INJECTION, SOLUTION INTRAVENOUS; SUBCUTANEOUS at 08:28:00

## 2024-01-12 RX ADMIN — NEOSTIGMINE METHYLSULFATE 5 MG: 1 INJECTION, SOLUTION INTRAVENOUS at 10:03:00

## 2024-01-12 RX ADMIN — PROTAMINE SULFATE 50 MG: 10 INJECTION, SOLUTION INTRAVENOUS at 10:03:00

## 2024-01-12 RX ADMIN — ONDANSETRON 4 MG: 2 INJECTION INTRAMUSCULAR; INTRAVENOUS at 10:03:00

## 2024-01-12 NOTE — DISCHARGE INSTRUCTIONS
HOME CARE INSTRUCTIONS FOLLOWING CARDIAC ABLATION (RFA) OR ELECTROPHYSIOLOGIC STUDY (EPS)    Activity:    - DO NOT drive after the procedure. You may resume driving late the following day according to the nurse or physician's instructions.  - Plan on resting and relaxing tonight and tomorrow. It will be normal to tire easily for the first few days, depending on the length of the procedure and the amount of sedation you received.   - DO NOT lift anything over 10 pounds for the next 24 hours.  - Avoid sexual activity for the next 24 hours.  - Avoid repeated stair use and excessive walking for the next 24 hours.   - Avoid drinking alcohol for the next 24 hours.  - Resume your normal activity after 48 hours, or as instructed by your physician.      What is Normal:    - You may feel extra heart beats. If these beats come too often or you feel an episode of multiple fast heartbeats, notify your physician.  - The procedure site may appear bruised or discolored.  - There may be a small amount of drainage on the bandage  - There may be mild tenderness to the procedure site when touched, which is common.       Special Instructions:    - The bandage is to remain in place for 24 hours. Keep the bandage clean and dry. Do not submerge the site for 72 hours (no tub, baths or pools).  - After 24 hours you must remove the bandage. Wash the procedure site gently with soap and water. If you choose to wear a bandaid for a few days, make sure it remains clean and dry and that it is changed daily.  - The day after the procedure you may shower after you remove your dressing (but not baths).      When you should NOTIFY YOUR PHYSICIAN:    - If you have shortness of breath or a persistent cough  - If you have chest pain (angina)  - If you have persistent pain at the procedure site  - If you experience a fever with a temperature >101 degrees, chills, infection (redness, swelling, thick yellow drainage, or a foul odor from procedure  site)      Other:    - You may resume your present diet, unless otherwise directed by your physician  - You may resume all of your medications as prescribed, unless otherwise directed by your physician. A list of your medications was provided to you at discharge.  - Please call your physician's office for a follow-up appointment. You should be seen in 2-4 weeks.

## 2024-01-12 NOTE — ANESTHESIA PREPROCEDURE EVALUATION
PRE-OP EVALUATION    Patient Name: Lopez Miranda    Admit Diagnosis: A-fib ([facility]) [I48.91]    Pre-op Diagnosis: * No surgery found *        Anesthesia Procedure: CATH EP    * Surgery not found *    Pre-op vitals reviewed.        Body mass index is 27.8 kg/m².    Current medications reviewed.  Hospital Medications:  No current facility-administered medications on file as of 1/12/2024.       Outpatient Medications:   (Not in a hospital admission)      Allergies: Patient has no known allergies.      Anesthesia Evaluation    Patient summary reviewed.    Anesthetic Complications  (-) history of anesthetic complications         GI/Hepatic/Renal    Negative GI/hepatic/renal ROS.                             Cardiovascular  Comment: 1. Left ventricle: The cavity size was normal. Wall thickness was normal.      Systolic function was vigorous. The estimated ejection fraction was 70%.      No diagnostic evidence for regional wall motion abnormalities. LVOT      diameter (S): 2.4cm. Cardiac index (Qs/bsa) (LVOT, Doppler):      3.6L/(min-m^2).   2. Aortic valve: Trileaflet; moderately calcified leaflets. Transvalvular      velocity was increased. There was moderate stenosis. Trivial      regurgitation. Peak velocity (S): 3.84m/sec. Mean gradient (S): 25mm Hg.      Valve area (VTI): 1.45cm^2. Indexed valve area (VTI): 0.67cm^2/m^2.   3. Left atrium: The left atrium was moderately dilated. The left atrial      volume was moderately increased.   4. Atrial septum: There was an atrial septal aneurysm without evidence for a      patent foramen ovale.   5. Pulmonary arteries: Systolic pressure could not be accurately estimated.                   (+) hypertension     (+) CAD        (+) valvular problems/murmurs and AS and AI    (+) dysrhythmias and atrial fibrillation                  Endo/Other             (+) hyperthyroidism                     Pulmonary    Negative pulmonary ROS.                       Neuro/Psych                                       Past Surgical History:   Procedure Laterality Date    COLONOSCOPY      OTHER      right shoulder      Social History     Socioeconomic History    Marital status: Single   Tobacco Use    Smoking status: Never    Smokeless tobacco: Never   Vaping Use    Vaping Use: Never used   Substance and Sexual Activity    Alcohol use: Yes     Comment: social    Drug use: Never   Other Topics Concern    Caffeine Concern Yes     Comment: 2 cups of coffee every morning and sometimes pop during the day.     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  Lab Results   Component Value Date    WBC 4.7 12/23/2023    RBC 4.96 12/23/2023    HGB 14.4 12/23/2023    HCT 42.6 12/23/2023    MCV 85.9 12/23/2023    MCH 29.0 12/23/2023    MCHC 33.8 12/23/2023    RDW 12.4 12/23/2023    .0 12/23/2023     Lab Results   Component Value Date     12/23/2023    K 4.3 12/23/2023     12/23/2023    CO2 25.0 12/23/2023    BUN 11 12/23/2023    CREATSERUM 0.91 12/23/2023     (H) 12/23/2023    CA 9.4 12/23/2023            Airway      Mallampati: III  Mouth opening: 3 FB  TM distance: 4 - 6 cm   Cardiovascular             Dental  Comment: No loose teeth per patient               Pulmonary                     Other findings              ASA: 3   Plan: general  NPO status verified and     Post-procedure pain management plan discussed with surgeon and patient.    Comment: GETA/LMA discussed in detail.  Risk of complications discussed including but not limited to sore throat, cough, PONV discussed. Also, discussed risks including dental injury particularly on any weakened, treated or diseased teeth & pt wishes to proceed  All questions answered.    Plan/risks discussed with: patient                Present on Admission:  **None**

## 2024-01-12 NOTE — ANESTHESIA PROCEDURE NOTES
Arterial Line    Date/Time: 1/12/2024 8:05 AM    Performed by: Avinash Gastelum MD  Authorized by: Avinash Gastelum MD    General Information and Staff    Procedure Start:  1/12/2024 8:05 AM  Procedure End:  1/12/2024 8:05 AM  Anesthesiologist:  Avinash Gastelum MD  Performed By:  Anesthesiologist  Patient Location:  OR  Indication: continuous blood pressure monitoring and blood sampling needed    Site Identification: real time ultrasound guided and image stored and retrievable    Preanesthetic Checklist: 2 patient identifiers, IV checked, risks and benefits discussed, monitors and equipment checked, pre-op evaluation, timeout performed, anesthesia consent and sterile technique used    Procedure Details    Catheter Size:  20 G  Catheter Length:  1 and 3/4 inch  Catheter Type:  Arrow  Seldinger Technique?: Yes    Laterality:  Left  Site:  Radial artery  Site Prep: chlorhexidine    Line Secured:  Wrist Brace, tape and Tegaderm    Assessment    Events: patient tolerated procedure well with no complications      Medications  1/12/2024 8:05 AM      Additional Comments

## 2024-01-12 NOTE — H&P
[facility]  Pre Procedure History and Physical    Lopez Miranda Patient Status:  Outpatient    1955 MRN [id number]   Location [facility] INTERVENTIONAL SUITES Attending Zoltan Lara MD   Hosp Day # 0 PCP Maria Elena Serrano MD     Consults      History of Present Illness:  Lopez Miranda is a a(n) 68 year old male here for RF PVI/CTI      Prior H/P Reviewed with no changes  PULMONARY VEIN DIMENSIONS:  Left Superior Pulm Vein:         2.14 cm x 1.86 cm  Left Inferior Pulm Vein:            2.02 cm x 0.85 cm  Right Superior Pulm Vein:       2.20 cm x 1.82 cm  Right Inferior Pulm Vein:         1.91 cm x 1.29 cm     CORONARY ARTERIES:  Dominance Left.  Origin with no anomalies.  Calcification and not protocolled to allow adequate assessment for degree of epicardial coronary artery stenosis.     CONCLUSION:   1)  Four pulmonary veins as described above.  2)  No left atrial appendage thrombus.    History:  Past Medical History:   Diagnosis Date    A-fib (HCC)     Arrhythmia     Coronary artery disease involving native coronary artery of native heart without angina pectoris 2020    Essential hypertension     High blood pressure     High cholesterol     Hyperlipidemia      Past Surgical History:   Procedure Laterality Date    COLONOSCOPY      OTHER      right shoulder      No family history on file.   reports that he has never smoked. He has never used smokeless tobacco. He reports current alcohol use. He reports that he does not use drugs.    Allergies:  No Known Allergies    Medications:    Current Facility-Administered Medications:     [START ON 2024] chlorhexidine (Hibiclens) 4 % external liquid 30 mL, 30 mL, Topical, On Call    [START ON 2024] sodium chloride 0.9% infusion, , Intravenous, On Call    iodixanol (VISIPAQUE) 320 MG/ML injection 100 mL, 100 mL, Injection, ONCE PRN    OBJECTIVE      Physical Exam:  Physical Exam   Blood pressure (!) 174/77, pulse 65, temperature 97.6  °F (36.4 °C), temperature source Temporal, resp. rate 17, height 6' (1.829 m), weight 205 lb (93 kg), SpO2 95%.  Temp (24hrs), Av.6 °F (36.4 °C), Min:97.6 °F (36.4 °C), Max:97.6 °F (36.4 °C)    Wt Readings from Last 3 Encounters:   24 205 lb (93 kg)   23 216 lb (98 kg)   23 214 lb (97.1 kg)       NAD  PERRLA/EOMI  Neck veins not elevated  Carotids- no bruits  CTA bilaterally  Cardiac- RRR  Abdomen- Soft,Nontender, normal BS  Extremities- pulses normal  Edema-   Mood /Affect Congruent  Skin- no lesions          Results:   No results for input(s): \"GLU\", \"BUN\", \"CREATSERUM\", \"GFRAA\", \"GFRNAA\", \"EGFRCR\", \"CA\", \"NA\", \"K\", \"CL\", \"CO2\" in the last 168 hours.  No results for input(s): \"RBC\", \"HGB\", \"HCT\", \"MCV\", \"MCH\", \"MCHC\", \"RDW\", \"NEPRELIM\", \"WBC\", \"PLT\" in the last 168 hours.      [unfilled]  No results for input(s): \"BNP\" in the last 168 hours.  Lab Results   Component Value Date    INR 0.97 2017     Lab Results   Component Value Date    TROP <0.046 2017         No results found.       Assessment and Plan    Patient Active Problem List   Diagnosis    Carotid stenosis, asymptomatic, bilateral    Coronary artery disease involving native coronary artery of native heart without angina pectoris    Family history of early CAD    Nonrheumatic aortic valve stenosis    Pure hypercholesterolemia    Elevated alkaline phosphatase level    Thrombocytopenia (HCC)    Hyperthyroidism       Consent: Risks, Benefits and alternatives discussed in clinic. Reverified on the day of the procedure    Procedure Planned: RF PVI/CTI for persistent AF    Sedation Plan: Christel PRICE    Discharge Plan: same day      Zoltan Lara MD  Cardiac Electrophysiology  [facility]  2024  7:30 AM

## 2024-01-12 NOTE — PROCEDURES
Procedure Note    Lopez Miranda Location: Cath Lab   CSN [id number] MRN [id number]   Admission Date 1/12/2024  Operation Date 01/12/24    Attending Physician Zoltan Lara MD Operating Physician Zoltan Lara MD     Pre-Operative Diagnosis: Atrial fibrillation    Post-Operative Diagnosis: Same as above    Procedure Performed: EP & ABLATE SUPRAVENT ARRHYTHMIA/Atrial Fibrillation  1. Comprehensive electrophysiology study.   2. Coronary sinus catheter placement to record left atrial electrograms and pace the left atrium. .    3. Three-dimensional intracardiac mapping.   4. Intracardiac echocardiography (ICE).   5. Transseptal catheterization.   6. Radiofrequency Pulmonary Vein Isolation ablation for atrial fibrillation.   7. Venous closure with Vascade  8. Secondary Arrhythmia with distinct mechanism- Typical atrial flutter was indiced. CTI ablation  9. Secondary lines for Atrial Fibrillation:    Indication: Symptomatic paroxysmal atrial fibrillation.     EBL: Minimal    Summary of Case: The patient was brought to the EP lab in a fasting and   nonsedated state after providing informed consent. . The right and left groins were prepped   and draped in a sterile fashion.  Ultrasound-guided access was obtained.. Catheters were placed in the appropriate   Positions (HRA, CS, RV, HB) under ICE and 3D mapping guidance.   Sheaths:  8 Tamazight-Short sheath upsized for transeptal sheath and then the Vizigo sheath. Through this, we inserted an Octarray and then the Ablation catheter  10 fr: - Intracardiac Echo  7 fr: Decapolar catheter to the coronary sinus    An intracardiac echo catheter was placed in the mid right atrium and the  interatrial septum was clearly visualized. A [email or web address] versacross SL-1 sheath was then advanced  in the right femoral vein over a long  guidewire to the SVC-RA junction.  The sheath, dilator  and needle were withdrawn in the 5-6 oclock position until tenting was clearly   visualized within the  interatrial septum. The RF wire was advanced   through the fossa ovalis, this was confirmed  by ICE. The dilator   and sheath were gently advanced over the needle followed by removal of the  dilator and needle. A circular tip wire was advanced to the left atrium and the SL-1 advanced over the wire and then withdrawn to the right atrium.   The SL-1 sheath was then exchanged over a long guidewire for a Vizigo sheath   sheath. A heparin bolus was given prior to transeptal access and after femoral vein access   to maintain an ACT level of at least 300-350 seconds.    3-D Mapping:  A three-dimensional electroanatomic map was made using an Octarray multielectrode catheter.  From this mapping we found that 4 Pulmonary veins.     RF- Ablation:  We utilized a high power short duration strategy with 50 W lesions.  In the anterior wall we used an index of:500-550.  On the posterior wall we move the catheter to avoid stacking lesions and used an index of: 400 or Qmode plus 90W/4 second lesions  CTI ablation was performed at 40 calero, with bidirectional block  An esophageal temperature probe/ Active Attune Cooling balloon was placed to monitor temperatures during RF energy delivery.    All 4 pulmonary veins were isolated as demonstrated by entrance and exit block. This was confirmed by either differential pacing with the coronary sinus catheter (placed in the CS to record electrograms and pace the left atrium) and the circular pulmonary vein catheter. Catheter positions and cardiac anatomy was visualized by 3D mapping.   Protamine was given after a test dose, the sheaths were pulled and hemostasis was maintained with Vascade    ELECTROPHYSIOLOGY STUDY FINDINGS:   Sinus rhythm, cycle length 789 ms,   WI 162ms, QRS 92 ms, QT 420ms.   AH 108ms, HV 44ms.     CONCLUSIONS:   1. Sinus node function normal.   2. Atrioventricular node function normal.   3. His-Purkinje function normal.   4. Pulmonary vein isolation was achieved with  radiofrequency.    5. There was no pericardial effusion observed at the conclusion of   the procedure, confirmed by intracardiac echocardiography.  6.  Additional lesions for AF:None  7.  Additional arrhythmias:Atrial Flutter- CTI ablation  8. Venous closure- Vascade    Complications:  None      Plan:    1) Continue oral anticoagulation , uninterrupted.  2) Continue All other meds  3) Bedrest for 2 hrs  4) Pantoprazole 40 mg daily ( new prescription) - 1 month only  5) Follow up with me in 1 month  6) No TTE needed before discharge.               Zoltan Lara MD    Cardiac Electrophysiololgy  [facility]  1/12/2024

## 2024-01-12 NOTE — PROGRESS NOTES
Pt s/p ablation via R fem vein with vascade closure. Site remained CDI with no bleeding or hematoma noted including after ambulating and voiding.Post procedure VSS. Pt denied pain. Tolerated PO intake. IV's d/c'd. Discharge instructions given-pt verbalized understanding. Pt given follow up kayla. to see Aniyah Herrera in 2 weeks. Pt to make a follow up with Dr Lara in 1 month.Pt already on Omeprazole. Did not give prescription for Protonix. Pt to lobby via WC and sister to drive home.

## 2024-01-12 NOTE — ANESTHESIA POSTPROCEDURE EVALUATION
[facility]    Lopez Miranda Patient Status:  Outpatient   Age/Gender 68 year old male MRN [id number]   Location [facility] POST ANESTHESIA CARE UNIT Attending Zoltan Lara MD   Hosp Day # 0 PCP Maria Elena Serrano MD       Anesthesia Post-op Note        Procedure Summary       Date: 01/12/24 Room / Location: [facility] Interventional [facility]    Anesthesia Start: 0755 Anesthesia Stop: 1029    Procedure: CATH EP Diagnosis:       A-fib (HCC)      A-fib (HCC)    Scheduled Providers: Avinash Gastelum MD Anesthesiologist: Avinash Gastelum MD    Anesthesia Type: general ASA Status: 3            Anesthesia Type: general    Vitals Value Taken Time   /88 01/12/24 1024   Temp 98 °F (36.7 °C) 01/12/24 1020   Pulse 79 01/12/24 1029   Resp 21 01/12/24 1029   SpO2 95 % 01/12/24 1029   Vitals shown include unfiled device data.    Patient Location: PACU    Anesthesia Type: general    Airway Patency: patent    Postop Pain Control: adequate    Mental Status: mildly sedated but able to meaningfully participate in the post-anesthesia evaluation    Nausea/Vomiting: none    Cardiopulmonary/Hydration status: stable euvolemic    Complications: no apparent anesthesia related complications    Postop vital signs: stable    Dental Exam: Unchanged from Preop    Patient to be discharged from PACU when criteria met.

## 2024-01-12 NOTE — ANESTHESIA PROCEDURE NOTES
Peripheral IV  Date/Time: 1/12/2024 8:58 AM  Inserted by: Avinash Gastelum MD    Placement  Needle size: 18 G  Laterality: left  Location: antecubital  Local anesthetic: none  Site prep: chlorhexidine  Technique: ultrasound guided  Attempts: 1

## 2024-01-12 NOTE — PROGRESS NOTES
From PACU, AOx3, VSS, R groin CDI, family at bedside. Only complaint is he needs to void, urinal, was able to go a little.  Plan is to get up and walk and discharge to home.

## 2024-01-12 NOTE — ANESTHESIA PROCEDURE NOTES
Airway  Date/Time: 1/12/2024 8:04 AM  Urgency: elective      General Information and Staff    Patient location during procedure: OR  Anesthesiologist: Avinash Gastelum MD  Performed: anesthesiologist   Performed by: Avinash Gastelum MD  Authorized by: Avinash Gastelum MD      Indications and Patient Condition  Indications for airway management: anesthesia  Sedation level: deep  Preoxygenated: yes  Patient position: sniffing  Mask difficulty assessment: 1 - vent by mask    Final Airway Details  Final airway type: endotracheal airway      Successful airway: ETT  Cuffed: yes   Successful intubation technique: direct laryngoscopy  Endotracheal tube insertion site: oral  Blade: Darwin  Blade size: #3  ETT size (mm): 8.0    Cormack-Lehane Classification: grade I - full view of glottis  Placement verified by: capnometry   Cuff volume (mL): 10  Measured from: lips  ETT to lips (cm): 23  Number of attempts at approach: 1    Additional Comments  atraumatic

## 2024-02-01 ENCOUNTER — LAB ENCOUNTER (OUTPATIENT)
Dept: LAB | Age: 69
End: 2024-02-01
Payer: COMMERCIAL

## 2024-02-01 DIAGNOSIS — E05.00 GRAVES DISEASE: ICD-10-CM

## 2024-02-01 LAB
T3 SERPL-MCNC: 129 NG/DL (ref 60–181)
T4 FREE SERPL-MCNC: 1.7 NG/DL (ref 0.8–1.7)
TSI SER-ACNC: <0.005 MIU/ML (ref 0.36–3.74)

## 2024-02-01 PROCEDURE — 84480 ASSAY TRIIODOTHYRONINE (T3): CPT

## 2024-02-01 PROCEDURE — 84439 ASSAY OF FREE THYROXINE: CPT

## 2024-02-01 PROCEDURE — 84443 ASSAY THYROID STIM HORMONE: CPT

## 2024-02-19 ENCOUNTER — OFFICE VISIT (OUTPATIENT)
Facility: CLINIC | Age: 69
End: 2024-02-19
Payer: COMMERCIAL

## 2024-02-19 VITALS — HEART RATE: 78 BPM | SYSTOLIC BLOOD PRESSURE: 128 MMHG | DIASTOLIC BLOOD PRESSURE: 80 MMHG | OXYGEN SATURATION: 98 %

## 2024-02-19 DIAGNOSIS — E05.90 THYROTOXICOSIS WITHOUT THYROID STORM, UNSPECIFIED THYROTOXICOSIS TYPE: ICD-10-CM

## 2024-02-19 DIAGNOSIS — E05.00 GRAVES DISEASE: Primary | ICD-10-CM

## 2024-02-19 DIAGNOSIS — T46.2X5A HYPERTHYROIDISM DUE TO AMIODARONE: ICD-10-CM

## 2024-02-19 DIAGNOSIS — E05.80 HYPERTHYROIDISM DUE TO AMIODARONE: ICD-10-CM

## 2024-02-19 PROCEDURE — 99214 OFFICE O/P EST MOD 30 MIN: CPT

## 2024-02-19 PROCEDURE — 3079F DIAST BP 80-89 MM HG: CPT

## 2024-02-19 PROCEDURE — 3074F SYST BP LT 130 MM HG: CPT

## 2024-02-19 RX ORDER — AMIODARONE HYDROCHLORIDE 100 MG/1
100 TABLET ORAL DAILY
COMMUNITY

## 2024-02-19 NOTE — PROGRESS NOTES
Endocrinology Clinic Note    Name: Lopez Miranda    Date: 2/19/2024    HISTORY OF PRESENT ILLNESS   Lopez Miranda is a 68 year old male with PMHx significant for atrial fibrillation dx'd in July 2022, HTN, CAD who presents for consultation for thyrotoxicosis    Initial HPI consult in July 2023  Thyroid hx:  (-) Fhx of thyroid disease   Thyroid dysfunction dates back to Nov 2020, TSH: <0.005, fT4: 3.07  Works as a  and farmer, has felt \"absolutely great\" over the last few years and self-describes as generally healthy which is why he delayed seeking care for abnormal thyroid tests  Dx'd with afib 2 years ago on a work physical, s/p 2 attempts cardioversion, on amiodarone. Follows with cardiology  No known fam hx of autoimmunity    11/2020, TSH: <0.005, fT4: 3.07  7/2023: TSH - <0.005, fT4 - 2.40    Pt endorses: amiodarone use and biotin use (taking Centrum MVI w/ 100% biotin daily)    Pt denies: fatigue/weakness, temperature intolerance, tremor, palpitations, vision changes, hair loss, hyperdefectation, dysphagia, and unexplained weight loss recent illness, weight loss medication use, and recent IV contrast for imaging      Interim hx:  Oct 2023- w/ endo APN  10/2023- TSH <0.005, fT4  2.90, total T3 189  Feeling generally well, noting some joint pain but has improved, some fatigue  On MMI 30mg daily, taking 10mg TID and hoping to change to BID dosing  Follows with Dr. Lara with [facility]- he has not discussed his hyperTH diagnosis with cards yet    Dec 2023- w/ endo APN  12/2023 labs: TSH <0.005, fT4 1.8, total T3 144, taking MMI 30mg daily (splits dose into two, 15mg doses)  Feeling well, he has never felt symptomatic of his hyperTH  Has upcoming ablation scheduled w/ cardiology, he is not sure about proceeding/asking about thyroid involvement with the afib    February 2024- w/ endo APN, re: thyroid:  Had ablation with cardiology- 1/12/24- decr'd amiodarone from 200mg --> 100mg daily,  plan is to meet again in April 2024 to possibly discontinue dose altogether  Feeling well, sleeping well. Occasionally more tired  Currently taking methimazole 15mg BID  2/2024 labs: TSH <0.005, fT4 1.7, total T3 129       REVIEW OF SYSTEMS  Ten point review of systems has been performed and is otherwise negative and/or non-contributory, except as described above.    Objective:    Medications:     Current Outpatient Medications:     amiodarone 100 MG Oral Tab, Take 1 tablet (100 mg total) by mouth daily., Disp: , Rfl:     methIMAzole 10 MG Oral Tab, Take 1.5 tablets (15 mg total) by mouth in the morning and 1.5 tablets (15 mg total) before bedtime., Disp: 270 tablet, Rfl:     apixaban 5 MG Oral Tab, Take 1 tablet (5 mg total) by mouth 2 (two) times daily., Disp: , Rfl:     enalapril 5 MG Oral Tab, Take 1 tablet (5 mg total) by mouth daily., Disp: 30 tablet, Rfl: 0    OMEPRAZOLE 20 MG Oral Capsule Delayed Release, TAKE 1 CAPSULE BY MOUTH EVERY DAY, Disp: 90 capsule, Rfl: 0    Rosuvastatin Calcium 10 MG Oral Tab, Take 1 tablet (10 mg total) by mouth nightly., Disp: , Rfl:     aspirin 81 MG Oral Chew Tab, Chew 2 tablets (162 mg total) by mouth daily., Disp: , Rfl:      Allergies:   No Known Allergies    Social History:   Social History     Socioeconomic History    Marital status: Single   Tobacco Use    Smoking status: Never    Smokeless tobacco: Never   Vaping Use    Vaping Use: Never used   Substance and Sexual Activity    Alcohol use: Yes     Comment: social    Drug use: Never   Other Topics Concern    Caffeine Concern Yes     Comment: 2 cups of coffee every morning and sometimes pop during the day.     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       Medical History:   Past Medical History:   Diagnosis Date    A-fib (HCC)     Arrhythmia     Coronary artery disease involving native coronary artery of native heart without angina pectoris 02/06/2020    Essential hypertension     High blood pressure     High  cholesterol     Hyperlipidemia          Surgical history:   Past Surgical History:   Procedure Laterality Date    COLONOSCOPY      OTHER      right shoulder        PHYSICAL EXAMINATION:  /80   Pulse 78   SpO2 98%     CONSTITUTIONAL:  awake, alert, cooperative, no apparent distress, and appears stated age  PSYCH: normal affect  EYES:  No proptosis,  conjunctiva normal, no lid lag or stare  ENT:  Normocephalic, atraumatic  NECK:  Symmetrical diffusely enlarged thyroid gland, non tender  LUNGS: breathing comfortably  ABDOMEN:  soft  SKIN:  no rashes and no lesions  EXTREMITIES: no edema    Labs:  11/2020: TSH: <0.005, fT4: 3.07  7/2023: TSH - <0.005, fT4 - 2.40  8/2023: TSH <0.005, fT4: 3.60, total T3 187, TSI 3.96  9/2023: TSH <0.005, fT4: 2.90, total T3 187  12/2023: TSH <0.005, fT4 1.8, total T3 144    Imaging:  Sept 2023 ultrasound:   Narrative   PROCEDURE:  US THYROID (CPT=76536)     COMPARISON:  None.     INDICATIONS:  E05.90 Thyrotoxicosis without thyroid storm, unspecified thyrotoxicosis type     TECHNIQUE:  High-resolution ultrasound of the thyroid gland was performed.     PATIENT STATED HISTORY: (As transcribed by Technologist)  Thyrotoxicosis     FINDINGS:       MEASUREMENTS:  RIGHT LOBE:  6.5 x 3.9 x 3.8 cm  LEFT LOBE:  6.4 x 3.5 x 2.6 cm  ISTHMUS:  1.1 cm     NODULES:  None.     OTHER:  None.      Impression   CONCLUSION:  TR1 - Benign (No FNA).  Overall enlarged thyroid gland.          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 (+)TSI antibodies c/w Graves.   Amiodarone use is contributing to the need for high-dose  MMI to achieve euthyroidism. 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     Previously attempted prednisone burst/taper to eval if more rapid TFT improvement, which would be more consistent with type 2 AIT,  however pt did not complete labs within time frame requested.  Thus, difficult to ascertain if improvement is attributable to steroids vs. MMI. TFTs have trended nicely downward on MMI. Plan to continue MMI at 30mg daily (takes 15mg BID). We will continue to closely monitor thyroid labs; discussed that as amiodarone is tapered, we can more rapidly taper down MMI.     - continue MMI 30mg daily (he is taking in split doses- 15mg (1.5 tab) qAM, 15mg (1.5 tab) qPM)  - TFs in 6 weeks (after 3/14)  - Long term plan with Graves is to wean MMI as tolerated over the course of 12-18 months while maintaining euthyroid state  - counseled on potential SE of MMI  - no opthalmopathy on exam, ref'd to optho for baseline testing given longstanding untreated Graves    Return in about 2 months (around 4/19/2024) for thyroid follow up.--> plan to meet after his visit with cardiology, so he can update on current amiodarone dose.    2/19/2024  BRUCE Mcintosh    A total of 30 minutes was spent today on obtaining history, reviewing pertinent labs, evaluating patient, providing multiple treatment options, reinforcing diet/exercise and compliance, and completing documentation.

## 2024-02-19 NOTE — PATIENT INSTRUCTIONS
Plan:  - Complete thyroid labs: 3/14/24 and 4/18/24  - your thyroid labs continue to trend down in the right direction. The free T4 looks good, but your TSH will remain suppressed for a bit still (it can lag behind the free T4 normalizing). Your amiodarone dose change will eventually mean your thyroid will be less overactive, and then we will need to cut down your methimazole dose. Since things are trending down, I am going to continue your current dose as is for a while longer. Once I see your free T 4 on the lower end of normal, we will cut down the dose.   - We will repeat labs in 6 weeks, and then again 6 weeks after before your next visit with me.   - meet around April after your visit with Dr. Lara, that way you can update me with your amiodarone dose change  - monitor yourself for symptoms of hypothyroidism (meaning we have you on too much of the thyroid medication): fatigue, constipation, weight gain--> if you're feeling like it is significantly worse, let me know sooner so we can repeat thyroid labs again before I have them ordered for you     Understanding thyroid labs:  - TSH = thyroid stimulating hormone, this is the 'signal' which turns UP or DOWN depending on how much hormone your thyroid gland is producing.    - free T4, total T3 = the thyroid hormones in your blood, this is the thyroid number you can \"Feel\"- if T4 is low, that indicates hypothyroidism, if T4 is high, that indicates hyperthyroidism       [facility] - BRUCE Mcintosh  Office phone number: [phone]  Fax number: [phone]

## 2024-02-28 ENCOUNTER — TELEPHONE (OUTPATIENT)
Facility: CLINIC | Age: 69
End: 2024-02-28

## 2024-02-28 DIAGNOSIS — E05.80 HYPERTHYROIDISM DUE TO AMIODARONE: Primary | ICD-10-CM

## 2024-02-28 DIAGNOSIS — T46.2X5A HYPERTHYROIDISM DUE TO AMIODARONE: Primary | ICD-10-CM

## 2024-02-28 NOTE — TELEPHONE ENCOUNTER
Received call from patient with medication question- wondering about possibly lowering dose    Patient states he has been having Side effects- fatigue, weakness, tired all of the time.     Had recent Heart ablation- would like to see if these are due to being over treated with thyroid medication.    Patient agrees to repeat lab work, labs pended and routed for review.

## 2024-02-29 DIAGNOSIS — T46.2X5A HYPERTHYROIDISM DUE TO AMIODARONE: ICD-10-CM

## 2024-02-29 DIAGNOSIS — E05.80 HYPERTHYROIDISM DUE TO AMIODARONE: ICD-10-CM

## 2024-02-29 NOTE — TELEPHONE ENCOUNTER
LOV: 24    RTC: 2 months     FU: scheduled 24    Last Refill: 10/2/23    Month Supply Pendin days     Per LOV note on 24,  \"- continue MMI 30mg daily (he is taking in split doses- 15mg (1.5 tab) qAM, 15mg (1.5 tab) qPM)\"    Component      Latest Ref Rng 2024   T4,Free (Direct)      0.8 - 1.7 ng/dL 1.7    TSH      0.358 - 3.740 mIU/mL <0.005 (L)    T3 TOTAL      60 - 181 ng/dL 129       Legend:  (L) Low

## 2024-02-29 NOTE — TELEPHONE ENCOUNTER
\"Can we call him and make sure he gets labs done today/tmrw before the weekend? I will wait on refill\"    Phoned patient and he agrees he will try to get labs done tomorrow- 3/1. States he has 9 pills left (good for 3 days).

## 2024-03-01 ENCOUNTER — LAB ENCOUNTER (OUTPATIENT)
Dept: LAB | Age: 69
End: 2024-03-01
Payer: COMMERCIAL

## 2024-03-01 DIAGNOSIS — E05.80 HYPERTHYROIDISM DUE TO AMIODARONE: ICD-10-CM

## 2024-03-01 DIAGNOSIS — T46.2X5A HYPERTHYROIDISM DUE TO AMIODARONE: ICD-10-CM

## 2024-03-01 LAB
T4 FREE SERPL-MCNC: 1.6 NG/DL (ref 0.8–1.7)
TSI SER-ACNC: <0.005 MIU/ML (ref 0.36–3.74)

## 2024-03-01 PROCEDURE — 84445 ASSAY OF TSI GLOBULIN: CPT

## 2024-03-01 PROCEDURE — 84439 ASSAY OF FREE THYROXINE: CPT

## 2024-03-01 PROCEDURE — 84443 ASSAY THYROID STIM HORMONE: CPT

## 2024-03-01 PROCEDURE — 84480 ASSAY TRIIODOTHYRONINE (T3): CPT

## 2024-03-02 LAB — T3 SERPL-MCNC: 117 NG/DL (ref 60–181)

## 2024-03-02 RX ORDER — METHIMAZOLE 10 MG/1
15 TABLET ORAL 2 TIMES DAILY
Qty: 270 TABLET | Refills: 0 | Status: SHIPPED | OUTPATIENT
Start: 2024-03-02

## 2024-03-04 LAB — THY STIM IMMUNO: 1.92 IU/L

## 2024-03-06 ENCOUNTER — TELEPHONE (OUTPATIENT)
Facility: CLINIC | Age: 69
End: 2024-03-06

## 2024-03-06 DIAGNOSIS — E05.00 GRAVES DISEASE: Primary | ICD-10-CM

## 2024-03-06 NOTE — TELEPHONE ENCOUNTER
Per Eloina:  \"TSI trended down nicely. Please touch base with patient- I sent the refill for MMI 15mg BID (30mg/day total)- please let him know we need to repeat labs again 4/1. I want to complete them monthly so we are diligent on weaning his MMI as his cardiologist weans his amiodarone.\"    RN phoned pt and pt made aware of Eloina's above orders/response. Pt verbalizes understanding.    Repeat thyroid labs pended and routed to Eloina for approval.

## 2024-03-30 ENCOUNTER — LAB ENCOUNTER (OUTPATIENT)
Dept: LAB | Age: 69
End: 2024-03-30
Payer: COMMERCIAL

## 2024-03-30 DIAGNOSIS — E05.00 GRAVES DISEASE: ICD-10-CM

## 2024-03-30 LAB
T3 SERPL-MCNC: 1.41 NG/ML (ref 0.6–1.81)
T4 FREE SERPL-MCNC: 1.3 NG/DL (ref 0.8–1.7)
TSI SER-ACNC: <0.005 MIU/ML (ref 0.36–3.74)

## 2024-03-30 PROCEDURE — 84439 ASSAY OF FREE THYROXINE: CPT

## 2024-03-30 PROCEDURE — 84445 ASSAY OF TSI GLOBULIN: CPT

## 2024-03-30 PROCEDURE — 84480 ASSAY TRIIODOTHYRONINE (T3): CPT

## 2024-03-30 PROCEDURE — 84443 ASSAY THYROID STIM HORMONE: CPT

## 2024-04-01 DIAGNOSIS — E05.00 GRAVES DISEASE: Primary | ICD-10-CM

## 2024-04-03 LAB — THY STIM IMMUNO: 1.86 IU/L

## 2024-04-25 ENCOUNTER — OFFICE VISIT (OUTPATIENT)
Facility: CLINIC | Age: 69
End: 2024-04-25
Payer: COMMERCIAL

## 2024-04-25 ENCOUNTER — PATIENT MESSAGE (OUTPATIENT)
Facility: CLINIC | Age: 69
End: 2024-04-25

## 2024-04-25 VITALS — OXYGEN SATURATION: 98 % | DIASTOLIC BLOOD PRESSURE: 74 MMHG | HEART RATE: 71 BPM | SYSTOLIC BLOOD PRESSURE: 122 MMHG

## 2024-04-25 DIAGNOSIS — E05.80 HYPERTHYROIDISM DUE TO AMIODARONE: ICD-10-CM

## 2024-04-25 DIAGNOSIS — E05.00 GRAVES DISEASE: Primary | ICD-10-CM

## 2024-04-25 DIAGNOSIS — T46.2X5A HYPERTHYROIDISM DUE TO AMIODARONE: ICD-10-CM

## 2024-04-25 PROCEDURE — 3074F SYST BP LT 130 MM HG: CPT

## 2024-04-25 PROCEDURE — 3078F DIAST BP <80 MM HG: CPT

## 2024-04-25 PROCEDURE — 99214 OFFICE O/P EST MOD 30 MIN: CPT

## 2024-04-25 NOTE — PATIENT INSTRUCTIONS
Return in about 2 months (around 6/25/2024) for thyroid follow up.    - continue methimazole 30mg daily 1.5 tab in AM, 1.5 tab in PM  - repeat labs again on 5/1  - at that time, will hopefully reduce methimazole dose

## 2024-04-25 NOTE — PROGRESS NOTES
[facility] Note    Name: Lopez Miranda    Date: 4/26/2024    HISTORY OF PRESENT ILLNESS   Lopez Miranda is a 69 year old male with PMHx significant for atrial fibrillation dx'd in July 2022, HTN, CAD, Graves disease who presents for consultation for thyrotoxicosis    Initial HPI consult in July 2023  Thyroid hx:  (-) Fhx of thyroid disease   Thyroid dysfunction dates back to Nov 2020, TSH: <0.005, fT4: 3.07  Works as a  and farmer, has felt \"absolutely great\" over the last few years and self-describes as generally healthy which is why he delayed seeking care for abnormal thyroid tests  Dx'd with afib 2 years ago on a work physical, s/p 2 attempts cardioversion, on amiodarone. Follows with cardiology  No known fam hx of autoimmunity    11/2020, TSH: <0.005, fT4: 3.07  7/2023: TSH - <0.005, fT4 - 2.40    Pt endorses: amiodarone use and biotin use (taking Centrum MVI w/ 100% biotin daily)    Pt denies: fatigue/weakness, temperature intolerance, tremor, palpitations, vision changes, hair loss, hyperdefectation, dysphagia, and unexplained weight loss recent illness, weight loss medication use, and recent IV contrast for imaging      Interim hx:  Oct 2023- w/ endo APN  10/2023- TSH <0.005, fT4  2.90, total T3 189  Feeling generally well, noting some joint pain but has improved, some fatigue  On MMI 30mg daily, taking 10mg TID and hoping to change to BID dosing  Follows with Dr. Lara with [facility]- he has not discussed his hyperTH diagnosis with cards yet    Dec 2023- w/ endo APN  12/2023 labs: TSH <0.005, fT4 1.8, total T3 144, taking MMI 30mg daily (splits dose into two, 15mg doses)  Feeling well, he has never felt symptomatic of his hyperTH  Has upcoming ablation scheduled w/ cardiology, he is not sure about proceeding/asking about thyroid involvement with the afib    February 2024- w/ endo APN, re: thyroid:  Had ablation with cardiology- 1/12/24- decr'd amiodarone from  200mg --> 100mg daily, plan is to meet again in April 2024 to possibly discontinue dose altogether  Feeling well, sleeping well. Occasionally more tired  Currently taking methimazole 15mg BID  2/2024 labs: TSH <0.005, fT4 1.7, total T3 129     April 2024- w/ endo APN, re: thyroid:  No chief complaint on file.  Feeling well on MMI 30mg daily. Still taking amiodarone 100mg daily, seeing cardiology Dr. Lara this afternoon and is anticipating another dose wean. Still notes ongoing fatigue (falling asleep early) especially on days he works. Denies other sx's of hypothyroidism, including constipation, hair loss.   3/2/2024- TSH <0.005, fT4 1.6, TT3 117,   3/30/24- TSH <0.005, fT4 1.30, TT3 1.41 - taking MMI 30mg daily      REVIEW OF SYSTEMS  Ten point review of systems has been performed and is otherwise negative and/or non-contributory, except as described above.    Objective:    Medications:     Current Outpatient Medications:     methIMAzole 10 MG Oral Tab, Take 1.5 tablets (15 mg total) by mouth in the morning and 1.5 tablets (15 mg total) before bedtime., Disp: 270 tablet, Rfl: 0    amiodarone 100 MG Oral Tab, Take 1 tablet (100 mg total) by mouth daily., Disp: , Rfl:     apixaban 5 MG Oral Tab, Take 1 tablet (5 mg total) by mouth 2 (two) times daily., Disp: , Rfl:     enalapril 5 MG Oral Tab, Take 1 tablet (5 mg total) by mouth daily., Disp: 30 tablet, Rfl: 0    OMEPRAZOLE 20 MG Oral Capsule Delayed Release, TAKE 1 CAPSULE BY MOUTH EVERY DAY, Disp: 90 capsule, Rfl: 0    Rosuvastatin Calcium 10 MG Oral Tab, Take 1 tablet (10 mg total) by mouth nightly., Disp: , Rfl:     aspirin 81 MG Oral Chew Tab, Chew 2 tablets (162 mg total) by mouth daily., Disp: , Rfl:      Allergies:   No Known Allergies    Social History:   Social History     Socioeconomic History    Marital status: Single   Tobacco Use    Smoking status: Never    Smokeless tobacco: Never   Vaping Use    Vaping status: Never Used   Substance and Sexual  Activity    Alcohol use: Yes     Comment: social    Drug use: Never   Other Topics Concern    Caffeine Concern Yes     Comment: 2 cups of coffee every morning and sometimes pop during the day.     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       Medical History:   Past Medical History:    A-fib (HCC)    Arrhythmia    Coronary artery disease involving native coronary artery of native heart without angina pectoris    Essential hypertension    High blood pressure    High cholesterol    Hyperlipidemia         Surgical history:   Past Surgical History:   Procedure Laterality Date    Colonoscopy      Other      right shoulder        PHYSICAL EXAMINATION:  /74   Pulse 71   SpO2 98%     CONSTITUTIONAL:  awake, alert, cooperative, no apparent distress, and appears stated age  PSYCH: normal affect  EYES:  No proptosis,  conjunctiva normal, no lid lag or stare  ENT:  Normocephalic, atraumatic  NECK:  Symmetrical diffusely enlarged thyroid gland, non tender  LUNGS: breathing comfortably  ABDOMEN:  soft  SKIN:  no rashes and no lesions  EXTREMITIES: no edema    Labs:  11/2020: TSH: <0.005, fT4: 3.07  7/2023: TSH - <0.005, fT4 - 2.40  8/2023: TSH <0.005, fT4: 3.60, total T3 187, TSI 3.96  9/2023: TSH <0.005, fT4: 2.90, total T3 187  12/2023: TSH <0.005, fT4 1.8, total T3 144  2/2024: TSH <0.005, fT4 1.7, total T3 129   3/2/2024- TSH <0.005, fT4 1.6, TT3 117,   3/30/24- TSH <0.005, fT4 1.30, TT3 1.41    Imaging:  Sept 2023 ultrasound:   Narrative   PROCEDURE:  US THYROID (CPT=76536)     COMPARISON:  None.     INDICATIONS:  E05.90 Thyrotoxicosis without thyroid storm, unspecified thyrotoxicosis type     TECHNIQUE:  High-resolution ultrasound of the thyroid gland was performed.     PATIENT STATED HISTORY: (As transcribed by Technologist)  Thyrotoxicosis     FINDINGS:       MEASUREMENTS:  RIGHT LOBE:  6.5 x 3.9 x 3.8 cm  LEFT LOBE:  6.4 x 3.5 x 2.6 cm  ISTHMUS:  1.1 cm     NODULES:  None.     OTHER:  None.      Impression    CONCLUSION:  TR1 - Benign (No FNA).  Overall enlarged thyroid gland.          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 Patient presented to endo clinic in July 2023, and initiated MMI treatment. (+)TSI antibodies c/w Graves.   Was taking amiodarone 200mg daily, which was contributing to need for high-dose MMI to achieve euthyroidism. He completed an ablation in Jan 2024 this year with cardiologist, Dr. Lara, and is now working on amiodarone wean.     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 Previously attempted prednisone burst/taper to eval if more rapid TFT improvement, which would be more consistent with type 2 AIT,  however pt did not complete labs within time frame requested.  Thus, difficult to ascertain if improvement was attributable to steroids vs. MMI. TFTs have trended nicely downward on MMI; TSI trended from Dec 2023 2.7 --> March 2024 1.92. patient to repeat labs in the next few days and anticipate we will reduce MMI dose at that time. If he were to develop hypothyroidism symptoms sooner, to contact clinic so we can repeat labs promptly, he states understanding.    - continue MMI 30mg daily (he is taking in split doses to reduce GI upset- 15mg (1.5 tab) qAM, 15mg (1.5 tab) qPM)  - TFTs q1mo for now  - Long term plan with Graves is to wean MMI as tolerated over the course of 12-18 months while maintaining euthyroid state  - counseled on potential SE of MMI  - no opthalmopathy on exam, ref'd to optho for baseline testing given longstanding untreated Graves  - addendum 4/26/2024: patient messaged after visit to inform amiodarone was stopped on 4/26/24  - plan to closely monitor TFTs in the coming months with  amiodarone wean; given amiodarones long half life, anticipate we should continue to see improvements in MMI needs in the next 60-90 days (end of June/early July)    Return in about 2 months (around 6/25/2024) for thyroid follow up.    4/26/2024  BRUCE Mcintosh

## 2024-05-04 ENCOUNTER — LAB ENCOUNTER (OUTPATIENT)
Dept: LAB | Age: 69
End: 2024-05-04
Payer: COMMERCIAL

## 2024-05-04 DIAGNOSIS — E05.00 GRAVES DISEASE: ICD-10-CM

## 2024-05-04 LAB
T3 SERPL-MCNC: 107 NG/DL (ref 60–181)
T4 FREE SERPL-MCNC: 1.2 NG/DL (ref 0.8–1.7)
TSI SER-ACNC: <0.005 MIU/ML (ref 0.36–3.74)

## 2024-05-04 PROCEDURE — 84443 ASSAY THYROID STIM HORMONE: CPT

## 2024-05-04 PROCEDURE — 84439 ASSAY OF FREE THYROXINE: CPT

## 2024-05-04 PROCEDURE — 84480 ASSAY TRIIODOTHYRONINE (T3): CPT

## 2024-05-06 DIAGNOSIS — E05.00 GRAVES DISEASE: Primary | ICD-10-CM

## 2024-05-25 RX ORDER — CYCLOBENZAPRINE HCL 10 MG
TABLET ORAL
Qty: 14 TABLET | Refills: 0 | OUTPATIENT
Start: 2024-05-25

## 2024-05-27 DIAGNOSIS — E05.80 HYPERTHYROIDISM DUE TO AMIODARONE: ICD-10-CM

## 2024-05-27 DIAGNOSIS — T46.2X5A HYPERTHYROIDISM DUE TO AMIODARONE: ICD-10-CM

## 2024-05-28 RX ORDER — METHIMAZOLE 10 MG/1
15 TABLET ORAL 2 TIMES DAILY
Qty: 270 TABLET | Refills: 0 | Status: SHIPPED | OUTPATIENT
Start: 2024-05-28

## 2024-05-28 NOTE — TELEPHONE ENCOUNTER
LOV:     RTC:2 months    FU:    Last Refill: 3/2    Month Supply Pendin days    Last lab result note: Free T4 continues to trend down.  Continue methimazole 15 mg twice daily for another month, repeat thyroid function tests in 1 month--anytime after 24-Labs already ordered, just notify patient please.  Thank you     Refill pended and routed for review.

## 2024-05-30 ENCOUNTER — OFFICE VISIT (OUTPATIENT)
Dept: FAMILY MEDICINE CLINIC | Facility: CLINIC | Age: 69
End: 2024-05-30

## 2024-05-30 VITALS
OXYGEN SATURATION: 98 % | HEART RATE: 79 BPM | BODY MASS INDEX: 29.8 KG/M2 | SYSTOLIC BLOOD PRESSURE: 122 MMHG | DIASTOLIC BLOOD PRESSURE: 82 MMHG | RESPIRATION RATE: 16 BRPM | WEIGHT: 220 LBS | TEMPERATURE: 97 F | HEIGHT: 72 IN

## 2024-05-30 DIAGNOSIS — M62.830 SPASM OF LEFT TRAPEZIUS MUSCLE: Primary | ICD-10-CM

## 2024-05-30 PROCEDURE — 3079F DIAST BP 80-89 MM HG: CPT | Performed by: NURSE PRACTITIONER

## 2024-05-30 PROCEDURE — 99213 OFFICE O/P EST LOW 20 MIN: CPT | Performed by: NURSE PRACTITIONER

## 2024-05-30 PROCEDURE — 3008F BODY MASS INDEX DOCD: CPT | Performed by: NURSE PRACTITIONER

## 2024-05-30 PROCEDURE — 3074F SYST BP LT 130 MM HG: CPT | Performed by: NURSE PRACTITIONER

## 2024-05-30 RX ORDER — CYCLOBENZAPRINE HCL 10 MG
10 TABLET ORAL 3 TIMES DAILY PRN
Qty: 30 TABLET | Refills: 0 | Status: SHIPPED | OUTPATIENT
Start: 2024-05-30 | End: 2024-06-19

## 2024-05-30 NOTE — PROGRESS NOTES
CHIEF COMPLAINT:     Chief Complaint   Patient presents with    Pain     Pain to L shoulder that radiates up to neck x 2 mo  Does see a chiro  OTC Tylenol        HPI:   Lopez Miranda is a 69 year old male who is here for complaints of back pain.  Pain is located at  left upper back/trapezius muscle . Pain is described as dull, aching. Severity is moderate. The pain radiates to  left neck area  with rotation. Pain was precipitated by unknown. Has had for 2  months. Pain is worsened by  movement of neck/shoulder . Gets relief of back pain with  tylenol, massage.  Has seen chiropractor as well, but minimal improvement in muscle tightness . Prior back pain hx: Denies numbness or tingling to arms.  No weakness to arms. Pt. Is a [employer] and very physical in labor.    Current Outpatient Medications   Medication Sig Dispense Refill    cyclobenzaprine 10 MG Oral Tab Take 1 tablet (10 mg total) by mouth 3 (three) times daily as needed for Muscle spasms. 30 tablet 0    METHIMAZOLE 10 MG Oral Tab TAKE 1.5 TABLETS (15 MG TOTAL) BY MOUTH IN THE MORNING AND 1.5 TABLETS (15 MG TOTAL) BEFORE BEDTIME. 270 tablet 0    amiodarone 100 MG Oral Tab Take 1 tablet (100 mg total) by mouth daily.      apixaban 5 MG Oral Tab Take 1 tablet (5 mg total) by mouth 2 (two) times daily.      enalapril 5 MG Oral Tab Take 1 tablet (5 mg total) by mouth daily. 30 tablet 0    OMEPRAZOLE 20 MG Oral Capsule Delayed Release TAKE 1 CAPSULE BY MOUTH EVERY DAY 90 capsule 0    Rosuvastatin Calcium 10 MG Oral Tab Take 1 tablet (10 mg total) by mouth nightly.      aspirin 81 MG Oral Chew Tab Chew 2 tablets (162 mg total) by mouth daily.        Past Medical History:    A-fib (HCC)    Arrhythmia    Coronary artery disease involving native coronary artery of native heart without angina pectoris    Essential hypertension    High blood pressure    High cholesterol    Hyperlipidemia      Social History:  Social History     Socioeconomic History    Marital status:  Single   Tobacco Use    Smoking status: Never    Smokeless tobacco: Never   Vaping Use    Vaping status: Never Used   Substance and Sexual Activity    Alcohol use: Yes     Comment: social    Drug use: Never   Other Topics Concern    Caffeine Concern Yes     Comment: 2 cups of coffee every morning and sometimes pop during the day.     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        REVIEW OF SYSTEMS:   GENERAL: feels well otherwise  SKIN: denies any unusual skin lesions  LUNGS: denies shortness of breath   CARDIOVASCULAR: denies chest pain or palpitations  GI: denies abdominal pain, N/VC/D.  Denies heartburn  : no dysuria, urgency or flank pain.  MUSCULOSKELETAL: Per HPI.  No other joints are affected  NEURO: No numbness or tingling.  No loss of bowel or bladder control.    EXAM:   /82   Pulse 79   Temp 97.4 °F (36.3 °C)   Resp 16   Ht 6' (1.829 m)   Wt 220 lb (99.8 kg)   SpO2 98%   BMI 29.84 kg/m²    GENERAL: well developed, well nourished,in no apparent distress  SKIN: no rashes,no suspicious lesions  NECK: supple,no adenopathy,no bruits  LUNGS: clear to auscultation  CARDIO: RRR without murmur  GI: normoactive bs x4, no masses, HSM or tenderness  EXTREMITIES: no cyanosis, clubbing or edema. Strength 5/5 Bilateral upper extremities.   BACK: No cervical spin tenderness  NEURO:   Sensation intact.    ASSESSMENT:   Lopez Miranda is a 69 year old male who presents with complaints of back pain. Findings are consistent with Muscle spasm    PLAN:   Comfort care as listed in patient instructions as w Medication as below.  Advised if any numbess/tingling/weaking or loss of bowel/bladder control seek emergent care.  Follow up with PCP in 3-5 days if back pain persists or worsens.   Requested Prescriptions     Signed Prescriptions Disp Refills    cyclobenzaprine 10 MG Oral Tab 30 tablet 0     Sig: Take 1 tablet (10 mg  total) by mouth 3 (three) times daily as needed for Muscle spasms.         There are no Patient Instructions on file for this visit.    The patient indicates understanding of these issues and agrees to the plan.  The patient is asked to return if sx's persist or worsen.

## 2024-06-04 ENCOUNTER — HOSPITAL ENCOUNTER (OUTPATIENT)
Dept: GENERAL RADIOLOGY | Age: 69
Discharge: HOME | End: 2024-06-04
Attending: FAMILY MEDICINE
Payer: COMMERCIAL

## 2024-06-04 ENCOUNTER — OFFICE VISIT (OUTPATIENT)
Dept: FAMILY MEDICINE CLINIC | Facility: CLINIC | Age: 69
End: 2024-06-04

## 2024-06-04 VITALS
TEMPERATURE: 97 F | BODY MASS INDEX: 30 KG/M2 | OXYGEN SATURATION: 98 % | WEIGHT: 219 LBS | SYSTOLIC BLOOD PRESSURE: 126 MMHG | RESPIRATION RATE: 16 BRPM | HEART RATE: 78 BPM | DIASTOLIC BLOOD PRESSURE: 84 MMHG

## 2024-06-04 DIAGNOSIS — M54.2 NECK PAIN: Primary | ICD-10-CM

## 2024-06-04 DIAGNOSIS — S46.812A STRAIN OF LEFT TRAPEZIUS MUSCLE, INITIAL ENCOUNTER: ICD-10-CM

## 2024-06-04 DIAGNOSIS — M54.2 NECK PAIN: ICD-10-CM

## 2024-06-04 PROCEDURE — 3074F SYST BP LT 130 MM HG: CPT | Performed by: FAMILY MEDICINE

## 2024-06-04 PROCEDURE — 3079F DIAST BP 80-89 MM HG: CPT | Performed by: FAMILY MEDICINE

## 2024-06-04 PROCEDURE — 72040 X-RAY EXAM NECK SPINE 2-3 VW: CPT | Performed by: FAMILY MEDICINE

## 2024-06-04 PROCEDURE — 99213 OFFICE O/P EST LOW 20 MIN: CPT | Performed by: FAMILY MEDICINE

## 2024-06-04 NOTE — PATIENT INSTRUCTIONS
Start with heating pad 3 times a day.  Can do tylenol every 8 hrs for pain.  Continue with muscle relaxer flexeril.

## 2024-06-04 NOTE — PROGRESS NOTES
Chief Complaint   Patient presents with    Neck Pain     Stiff neck     Shoulder Pain     Left side X 2 months         HPI:    Patient ID: Lopez Miranda is a 69 year old male.    HPI Left shoulder pain and neck pain symptoms started 2months ago. He was seen in walk in.  Pain is left neck area. He is seeing criropracter. He got deep tissue massage. It has not helped. He feels stiffness. He is icing it. No shoulder pain no hand pain.  He is taking tylenol. He got new pillow. He took cyclobenzaprine. At night time he feels ok but day time stiffness with neck pain.   No spine neck pain. No tingling no numbness no fall no neck injury. He started muscle relaxer on Friday 2 times a day. It is no helping.     Review of Systems  Pos neck pain.       Current Outpatient Medications   Medication Sig Dispense Refill    Multiple Vitamin (MULTI-VITAMIN) Oral Tab multivitamin tablet, [RxNorm: 0]      METHIMAZOLE 10 MG Oral Tab TAKE 1.5 TABLETS (15 MG TOTAL) BY MOUTH IN THE MORNING AND 1.5 TABLETS (15 MG TOTAL) BEFORE BEDTIME. 270 tablet 0    apixaban 5 MG Oral Tab Take 1 tablet (5 mg total) by mouth 2 (two) times daily.      enalapril 5 MG Oral Tab Take 1 tablet (5 mg total) by mouth daily. 30 tablet 0    OMEPRAZOLE 20 MG Oral Capsule Delayed Release TAKE 1 CAPSULE BY MOUTH EVERY DAY 90 capsule 0    Rosuvastatin Calcium 10 MG Oral Tab Take 1 tablet (10 mg total) by mouth nightly.      aspirin 81 MG Oral Chew Tab Chew 2 tablets (162 mg total) by mouth daily.       Allergies:No Known Allergies    HISTORY:  Past Medical History:    A-fib (HCC)    Arrhythmia    Coronary artery disease involving native coronary artery of native heart without angina pectoris    Essential hypertension    High blood pressure    High cholesterol    Hyperlipidemia      Past Surgical History:   Procedure Laterality Date    Colonoscopy      Other      right shoulder       History reviewed. No pertinent family history.   Social History:   Social History      Socioeconomic History    Marital status: Single   Tobacco Use    Smoking status: Never    Smokeless tobacco: Never   Vaping Use    Vaping status: Never Used   Substance and Sexual Activity    Alcohol use: Yes     Comment: social    Drug use: Never   Other Topics Concern    Caffeine Concern Yes     Comment: 2 cups of coffee every morning and sometimes pop during the day.     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     Appearance: He is not ill-appearing.   Cardiovascular:      Rate and Rhythm: Normal rate and regular rhythm.   Musculoskeletal:      Comments: Left side neck tenderness no shoulder no spine tenderness.  Power is normal no sensory deficit.    Neurological:      Mental Status: He is alert.              ASSESSMENT/PLAN:   1. Neck pain  Xray ordered.  Pain meds ice or heat and stretching exercise  PT after xray  - XR CERVICAL SPINE (2-3 VIEWS) (CPT=72040); Future    2. Strain of left trapezius muscle, initial encounter  Plan as above  - XR CERVICAL SPINE (2-3 VIEWS) (CPT=72040); Future             Return if symptoms worsen or fail to improve, for DR. WOODALL.

## 2024-06-13 ENCOUNTER — TELEPHONE (OUTPATIENT)
Dept: FAMILY MEDICINE CLINIC | Facility: CLINIC | Age: 69
End: 2024-06-13

## 2024-06-13 DIAGNOSIS — M50.30 DEGENERATIVE CERVICAL DISC: ICD-10-CM

## 2024-06-13 DIAGNOSIS — M54.12 CERVICAL RADICULOPATHY: Primary | ICD-10-CM

## 2024-06-13 NOTE — TELEPHONE ENCOUNTER
Patient advised and verbalized understanding.  [address] [place] [address] [place] [address]  Telephone #: [phone]  Fax #: [phone]

## 2024-06-15 ENCOUNTER — LAB ENCOUNTER (OUTPATIENT)
Dept: LAB | Age: 69
End: 2024-06-15
Attending: FAMILY MEDICINE
Payer: COMMERCIAL

## 2024-06-15 DIAGNOSIS — E05.00 GRAVES DISEASE: ICD-10-CM

## 2024-06-15 LAB
T3 SERPL-MCNC: 94 NG/DL (ref 60–181)
T4 FREE SERPL-MCNC: 1 NG/DL (ref 0.8–1.7)
TSI SER-ACNC: <0.005 MIU/ML (ref 0.36–3.74)

## 2024-06-15 PROCEDURE — 84443 ASSAY THYROID STIM HORMONE: CPT

## 2024-06-15 PROCEDURE — 84480 ASSAY TRIIODOTHYRONINE (T3): CPT

## 2024-06-15 PROCEDURE — 84439 ASSAY OF FREE THYROXINE: CPT

## 2024-06-17 DIAGNOSIS — E05.00 GRAVES DISEASE: Primary | ICD-10-CM

## 2024-06-27 ENCOUNTER — HOSPITAL ENCOUNTER (OUTPATIENT)
Dept: MRI IMAGING | Age: 69
Discharge: HOME | End: 2024-06-27
Attending: FAMILY MEDICINE
Payer: COMMERCIAL

## 2024-06-27 DIAGNOSIS — M54.12 CERVICAL RADICULOPATHY: ICD-10-CM

## 2024-06-27 DIAGNOSIS — M50.30 DEGENERATIVE CERVICAL DISC: ICD-10-CM

## 2024-06-27 PROCEDURE — 72141 MRI NECK SPINE W/O DYE: CPT | Performed by: FAMILY MEDICINE

## 2024-07-16 ENCOUNTER — TELEPHONE (OUTPATIENT)
Dept: FAMILY MEDICINE CLINIC | Facility: CLINIC | Age: 69
End: 2024-07-16

## 2024-07-16 DIAGNOSIS — M54.12 CERVICAL RADICULOPATHY: Primary | ICD-10-CM

## 2024-07-16 NOTE — TELEPHONE ENCOUNTER
Patient saw dr camilo on 6/4/24, regarding neck pain. Dr gave him options of Physical therapy or pain management,  patient states he would like more information regarding the Pain Management.   Also, per patient, Dr camilo gave him a prescription for muscle relaxer's, but this did not help.

## 2024-07-16 NOTE — TELEPHONE ENCOUNTER
[employer] message sent to patient regarding pain management specialist    7/16/2024 11:15 AM Y Yoselin Celestin RN Patient Medical Advice Request  Pain management

## 2024-07-27 ENCOUNTER — LAB ENCOUNTER (OUTPATIENT)
Dept: LAB | Age: 69
End: 2024-07-27
Payer: COMMERCIAL

## 2024-07-27 DIAGNOSIS — E05.00 GRAVES DISEASE: ICD-10-CM

## 2024-07-27 LAB
T3 SERPL-MCNC: 1.43 NG/ML (ref 0.6–1.81)
T4 FREE SERPL-MCNC: 1.4 NG/DL (ref 0.8–1.7)
TSI SER-ACNC: 0.01 MIU/ML (ref 0.55–4.78)

## 2024-07-27 PROCEDURE — 84445 ASSAY OF TSI GLOBULIN: CPT

## 2024-07-27 PROCEDURE — 84439 ASSAY OF FREE THYROXINE: CPT

## 2024-07-27 PROCEDURE — 84480 ASSAY TRIIODOTHYRONINE (T3): CPT

## 2024-07-27 PROCEDURE — 84443 ASSAY THYROID STIM HORMONE: CPT

## 2024-07-29 DIAGNOSIS — T46.2X5A HYPERTHYROIDISM DUE TO AMIODARONE: Primary | ICD-10-CM

## 2024-07-29 DIAGNOSIS — E05.80 HYPERTHYROIDISM DUE TO AMIODARONE: Primary | ICD-10-CM

## 2024-07-29 LAB — THY STIM IMMUNO: 1.66 IU/L

## 2024-08-07 ENCOUNTER — OFFICE VISIT (OUTPATIENT)
Dept: FAMILY MEDICINE CLINIC | Facility: CLINIC | Age: 69
End: 2024-08-07
Payer: COMMERCIAL

## 2024-08-07 ENCOUNTER — APPOINTMENT (OUTPATIENT)
Dept: CV DIAGNOSTICS | Facility: HOSPITAL | Age: 69
End: 2024-08-07
Attending: INTERNAL MEDICINE
Payer: COMMERCIAL

## 2024-08-07 ENCOUNTER — HOSPITAL ENCOUNTER (INPATIENT)
Facility: HOSPITAL | Age: 69
LOS: 1 days | Discharge: HOME | End: 2024-08-08
Attending: STUDENT IN AN ORGANIZED HEALTH CARE EDUCATION/TRAINING PROGRAM | Admitting: HOSPITALIST
Payer: COMMERCIAL

## 2024-08-07 ENCOUNTER — APPOINTMENT (OUTPATIENT)
Dept: GENERAL RADIOLOGY | Age: 69
End: 2024-08-07
Attending: STUDENT IN AN ORGANIZED HEALTH CARE EDUCATION/TRAINING PROGRAM
Payer: COMMERCIAL

## 2024-08-07 ENCOUNTER — APPOINTMENT (OUTPATIENT)
Dept: CT IMAGING | Age: 69
End: 2024-08-07
Attending: STUDENT IN AN ORGANIZED HEALTH CARE EDUCATION/TRAINING PROGRAM
Payer: COMMERCIAL

## 2024-08-07 VITALS
BODY MASS INDEX: 28.99 KG/M2 | RESPIRATION RATE: 18 BRPM | HEART RATE: 86 BPM | WEIGHT: 214 LBS | DIASTOLIC BLOOD PRESSURE: 82 MMHG | OXYGEN SATURATION: 97 % | TEMPERATURE: 98 F | SYSTOLIC BLOOD PRESSURE: 142 MMHG | HEIGHT: 72 IN

## 2024-08-07 DIAGNOSIS — D69.6 THROMBOCYTOPENIA (HCC): ICD-10-CM

## 2024-08-07 DIAGNOSIS — J18.9 COMMUNITY ACQUIRED PNEUMONIA OF RIGHT UPPER LOBE OF LUNG: Primary | ICD-10-CM

## 2024-08-07 DIAGNOSIS — R50.9 SUBJECTIVE FEVER: Primary | ICD-10-CM

## 2024-08-07 DIAGNOSIS — E87.1 HYPONATREMIA: ICD-10-CM

## 2024-08-07 DIAGNOSIS — R52 BODY ACHES: ICD-10-CM

## 2024-08-07 LAB
ALBUMIN SERPL-MCNC: 3.2 G/DL (ref 3.4–5)
ALBUMIN/GLOB SERPL: 0.7 {RATIO} (ref 1–2)
ALP LIVER SERPL-CCNC: 81 U/L
ALT SERPL-CCNC: 18 U/L
ANION GAP SERPL CALC-SCNC: 6 MMOL/L (ref 0–18)
AST SERPL-CCNC: 15 U/L (ref 15–37)
ATRIAL RATE: 84 BPM
BASOPHILS # BLD AUTO: 0.03 X10(3) UL (ref 0–0.2)
BASOPHILS NFR BLD AUTO: 0.3 %
BILIRUB SERPL-MCNC: 0.9 MG/DL (ref 0.1–2)
BILIRUB UR QL STRIP.AUTO: NEGATIVE
BUN BLD-MCNC: 12 MG/DL (ref 9–23)
CALCIUM BLD-MCNC: 9.1 MG/DL (ref 8.5–10.1)
CHLORIDE SERPL-SCNC: 99 MMOL/L (ref 98–112)
CLARITY UR REFRACT.AUTO: CLEAR
CO2 SERPL-SCNC: 25 MMOL/L (ref 21–32)
COLOR UR AUTO: YELLOW
CREAT BLD-MCNC: 0.96 MG/DL
EGFRCR SERPLBLD CKD-EPI 2021: 86 ML/MIN/1.73M2 (ref 60–?)
EOSINOPHIL # BLD AUTO: 0.04 X10(3) UL (ref 0–0.7)
EOSINOPHIL NFR BLD AUTO: 0.4 %
ERYTHROCYTE [DISTWIDTH] IN BLOOD BY AUTOMATED COUNT: 12.5 %
GLOBULIN PLAS-MCNC: 4.4 G/DL (ref 2.8–4.4)
GLUCOSE BLD-MCNC: 115 MG/DL (ref 70–99)
GLUCOSE UR STRIP.AUTO-MCNC: NEGATIVE MG/DL
HCT VFR BLD AUTO: 38.3 %
HGB BLD-MCNC: 12.9 G/DL
IMM GRANULOCYTES # BLD AUTO: 0.05 X10(3) UL (ref 0–1)
IMM GRANULOCYTES NFR BLD: 0.5 %
KETONES UR STRIP.AUTO-MCNC: NEGATIVE MG/DL
LACTATE SERPL-SCNC: 1.8 MMOL/L (ref 0.4–2)
LEUKOCYTE ESTERASE UR QL STRIP.AUTO: NEGATIVE
LYMPHOCYTES # BLD AUTO: 0.86 X10(3) UL (ref 1–4)
LYMPHOCYTES NFR BLD AUTO: 9.4 %
MCH RBC QN AUTO: 29.9 PG (ref 26–34)
MCHC RBC AUTO-ENTMCNC: 33.7 G/DL (ref 31–37)
MCV RBC AUTO: 88.9 FL
MONOCYTES # BLD AUTO: 0.9 X10(3) UL (ref 0.1–1)
MONOCYTES NFR BLD AUTO: 9.9 %
MRSA DNA SPEC QL NAA+PROBE: NEGATIVE
NEUTROPHILS # BLD AUTO: 7.24 X10 (3) UL (ref 1.5–7.7)
NEUTROPHILS # BLD AUTO: 7.24 X10(3) UL (ref 1.5–7.7)
NEUTROPHILS NFR BLD AUTO: 79.5 %
NITRITE UR QL STRIP.AUTO: NEGATIVE
OPERATOR ID: NORMAL
OSMOLALITY SERPL CALC.SUM OF ELEC: 271 MOSM/KG (ref 275–295)
P AXIS: 69 DEGREES
P-R INTERVAL: 198 MS
PH UR STRIP.AUTO: 5.5 [PH] (ref 5–8)
PLATELET # BLD AUTO: 148 10(3)UL (ref 150–450)
PLATELETS.RETICULATED NFR BLD AUTO: 2.3 % (ref 0–7)
POTASSIUM SERPL-SCNC: 3.9 MMOL/L (ref 3.5–5.1)
PROT SERPL-MCNC: 7.6 G/DL (ref 6.4–8.2)
Q-T INTERVAL: 376 MS
QRS DURATION: 84 MS
QTC CALCULATION (BEZET): 444 MS
R AXIS: 30 DEGREES
RAPID SARS-COV-2 BY PCR: NOT DETECTED
RBC # BLD AUTO: 4.31 X10(6)UL
SODIUM SERPL-SCNC: 130 MMOL/L (ref 136–145)
SP GR UR STRIP.AUTO: 1.01 (ref 1–1.03)
T AXIS: 64 DEGREES
TROPONIN I SERPL HS-MCNC: 20 NG/L
UROBILINOGEN UR STRIP.AUTO-MCNC: 0.2 MG/DL
VENTRICULAR RATE: 84 BPM
WBC # BLD AUTO: 9.1 X10(3) UL (ref 4–11)

## 2024-08-07 PROCEDURE — 99223 1ST HOSP IP/OBS HIGH 75: CPT | Performed by: INTERNAL MEDICINE

## 2024-08-07 PROCEDURE — 3077F SYST BP >= 140 MM HG: CPT | Performed by: PHYSICIAN ASSISTANT

## 2024-08-07 PROCEDURE — 3008F BODY MASS INDEX DOCD: CPT | Performed by: PHYSICIAN ASSISTANT

## 2024-08-07 PROCEDURE — 3079F DIAST BP 80-89 MM HG: CPT | Performed by: PHYSICIAN ASSISTANT

## 2024-08-07 PROCEDURE — 99213 OFFICE O/P EST LOW 20 MIN: CPT | Performed by: PHYSICIAN ASSISTANT

## 2024-08-07 PROCEDURE — 71045 X-RAY EXAM CHEST 1 VIEW: CPT | Performed by: STUDENT IN AN ORGANIZED HEALTH CARE EDUCATION/TRAINING PROGRAM

## 2024-08-07 PROCEDURE — 93306 TTE W/DOPPLER COMPLETE: CPT | Performed by: INTERNAL MEDICINE

## 2024-08-07 PROCEDURE — U0002 COVID-19 LAB TEST NON-CDC: HCPCS | Performed by: PHYSICIAN ASSISTANT

## 2024-08-07 PROCEDURE — 70450 CT HEAD/BRAIN W/O DYE: CPT | Performed by: STUDENT IN AN ORGANIZED HEALTH CARE EDUCATION/TRAINING PROGRAM

## 2024-08-07 RX ORDER — ENALAPRIL MALEATE 5 MG/1
5 TABLET ORAL DAILY
Status: DISCONTINUED | OUTPATIENT
Start: 2024-08-08 | End: 2024-08-08

## 2024-08-07 RX ORDER — ACETAMINOPHEN 500 MG
500 TABLET ORAL EVERY 4 HOURS PRN
Status: DISCONTINUED | OUTPATIENT
Start: 2024-08-07 | End: 2024-08-08

## 2024-08-07 RX ORDER — MELATONIN
3 NIGHTLY PRN
Status: DISCONTINUED | OUTPATIENT
Start: 2024-08-07 | End: 2024-08-08

## 2024-08-07 RX ORDER — ACETAMINOPHEN 500 MG
1000 TABLET ORAL ONCE
Status: COMPLETED | OUTPATIENT
Start: 2024-08-07 | End: 2024-08-07

## 2024-08-07 RX ORDER — GUAIFENESIN 600 MG/1
600 TABLET, EXTENDED RELEASE ORAL 2 TIMES DAILY
Status: DISCONTINUED | OUTPATIENT
Start: 2024-08-07 | End: 2024-08-08

## 2024-08-07 RX ORDER — ONDANSETRON 2 MG/ML
4 INJECTION INTRAMUSCULAR; INTRAVENOUS EVERY 6 HOURS PRN
Status: DISCONTINUED | OUTPATIENT
Start: 2024-08-07 | End: 2024-08-08

## 2024-08-07 RX ORDER — ASPIRIN 81 MG/1
162 TABLET, CHEWABLE ORAL DAILY
Status: DISCONTINUED | OUTPATIENT
Start: 2024-08-08 | End: 2024-08-08

## 2024-08-07 RX ORDER — KETOROLAC TROMETHAMINE 15 MG/ML
15 INJECTION, SOLUTION INTRAMUSCULAR; INTRAVENOUS ONCE
Status: COMPLETED | OUTPATIENT
Start: 2024-08-07 | End: 2024-08-07

## 2024-08-07 RX ORDER — ROSUVASTATIN CALCIUM 10 MG/1
10 TABLET, COATED ORAL NIGHTLY
Status: DISCONTINUED | OUTPATIENT
Start: 2024-08-07 | End: 2024-08-08

## 2024-08-07 RX ORDER — PANTOPRAZOLE SODIUM 20 MG/1
20 TABLET, DELAYED RELEASE ORAL
Status: DISCONTINUED | OUTPATIENT
Start: 2024-08-08 | End: 2024-08-08

## 2024-08-07 NOTE — ED INITIAL ASSESSMENT (HPI)
Fever, chills, body aches and headache since Sunday. Had negative covid test at walk in today and sent for further evaluation.  States had a tooth break on Sunday while he was golfing.

## 2024-08-07 NOTE — H&P
[facility]                                                               [facility]         Lopez Miranda Patient Status:  Inpatient    1955 MRN [id number]   Location [facility] 0SW-A Attending Elisabeth Elizabeth MD   Hosp Day # 0 PCP Maria Elena Serrano MD     Chief Complaint: Fever, chills, body aches    History of Present Illness:  Lopez Miranda is a 69 year old male admitted with fever, chills, body aches.  Symptoms started on  night according to patient.  Fever with chills.  Denies any associated chest pain.  Denies any shortness of breath.  Denies any cold or cough symptoms.  Denies any nausea vomiting.  Denies any sick contact.  Denies any diarrhea constipation.  Denies any abdominal pain.  Patient states he has had a chronic heart murmur.  Has history of A-fib and CAD and hypertension and hyperlipidemia.    History:  Past Medical History:    A-fib (HCC)    Arrhythmia    Coronary artery disease involving native coronary artery of native heart without angina pectoris    Essential hypertension    High blood pressure    High cholesterol    Hyperlipidemia       Past Surgical History:   Procedure Laterality Date    Colonoscopy      Other      right shoulder        Family history:  History reviewed. No pertinent family history.   Reviewed    Social history:   reports that he has never smoked. He has never used smokeless tobacco. He reports current alcohol use. He reports that he does not use drugs.    Allergies:  No Known Allergies    Home Medications:  Medications Prior to Admission   Medication Sig Dispense Refill Last Dose    Multiple Vitamin (MULTI-VITAMIN) Oral Tab multivitamin tablet, [RxNorm: 0]       METHIMAZOLE 10 MG Oral Tab TAKE 1.5 TABLETS (15 MG TOTAL) BY MOUTH IN THE MORNING AND 1.5 TABLETS (15 MG TOTAL) BEFORE BEDTIME. 270 tablet 0     apixaban 5 MG Oral Tab Take 1 tablet (5 mg total) by mouth 2 (two) times daily.       enalapril 5 MG Oral Tab Take 1 tablet (5 mg  total) by mouth daily. 30 tablet 0     OMEPRAZOLE 20 MG Oral Capsule Delayed Release TAKE 1 CAPSULE BY MOUTH EVERY DAY 90 capsule 0     Rosuvastatin Calcium 10 MG Oral Tab Take 1 tablet (10 mg total) by mouth nightly.       aspirin 81 MG Oral Chew Tab Chew 2 tablets (162 mg total) by mouth daily.          Review of Systems:  A comprehensive 14 point review of systems was completed.  Pertinent positives and negatives noted in the the HPI.    Physical Exam:     Vital signs: Blood pressure (!) 142/96, pulse 95, temperature (!) 102.9 °F (39.4 °C), temperature source Oral, resp. rate 18, height 6' (1.829 m), weight 200 lb (90.7 kg), SpO2 98%.  General: No acute distress.   Respiratory: Clear to auscultation bilaterally.  No wheezes. No rhonchi.  Cardiovascular: S1, S2.  Regular rate and rhythm.  Systolic murmur in the aortic area. Equal pulses   Abdomen: Soft, nontender, nondistended.  Positive bowel sounds.  Neurologic: Awake alert, no focal neurological deficits.  Musculoskeletal: Full range of motion of all extremities.  No pedal edema or calf tenderness  Psychiatric: Appropriate mood and affect.      Diagnostic Data:      Laboratory Data:   Lab Results   Component Value Date    WBC 9.1 08/07/2024    HGB 12.9 08/07/2024    HCT 38.3 08/07/2024    .0 08/07/2024    CREATSERUM 0.96 08/07/2024    BUN 12 08/07/2024     08/07/2024    K 3.9 08/07/2024    CL 99 08/07/2024    CO2 25.0 08/07/2024     08/07/2024    CA 9.1 08/07/2024    ALB 3.2 08/07/2024    ALKPHO 81 08/07/2024    BILT 0.9 08/07/2024    TP 7.6 08/07/2024    AST 15 08/07/2024    ALT 18 08/07/2024     Recent Labs   Lab 08/07/24  0938   TROPHS 20       Imaging:  Imaging data reviewed in Epic.  Chest x-ray shows  CONCLUSION:    1. There is a large area of consolidative pneumonia in the anterior segment of the right upper lobe with slight depression of the horizontal fissure.   2. The left lung is clear.  No evidence of effusion.  No  lymphadenopathy.   3. The heart size and vascularity are normal.  There is no CHF.        ASSESSMENT / PLAN:     Right upper lobe pneumonia with fever and chills  IV antibiotics  Follow blood culture  Follow sputum culture  Check urine Legionella and streptococcal antigen  MRSA screen by PCR negative  COVID screening negative  Tylenol as needed fever  Robitussin as needed  Mucinex twice daily  Lactic acidosis normal at 1.8  Chest x-ray done on admission showed large area of consolidative pneumonia in the right upper lobe with slight depression of horizontal fissure, rule out Klebsiella pneumonia  Hypertension  Continue home enalapril  Follow blood pressure  Hyperlipidemia  Continue home statin  Paroxysmal atrial fibrillation  Currently normal sinus rhythm  Continue home apixaban  CAD  Monitor telemetry  Continue home aspirin and statin  Hyperthyroidism  Patient takes methimazole at home which was continued  GERD  Continue PPI  Chronic heart murmur, history of nonrheumatic aortic valve stenosis  Check 2D echo  Hyponatremia due to pneumonia  Follow BMP in a.m.  Mild thrombocytopenia  Follow CBC in a.m.    Quality:  DVT Prophylaxis: DVT Mechanical Prophylaxis:   SCDs,    DVT Pharmacologic Prophylaxis   Medication    apixaban (Eliquis) tab 5 mg              CODE status:   Code Status: Not on file  Rivers: No urinary catheter in place      Plan of care discussed with patient      Discussed with my colleague who received signout from [facility].  Discussed with RN  **Certification      PHYSICIAN Certification of Need for Inpatient Hospitalization - Initial Certification    Patient will require inpatient services that will reasonably be expected to span two midnight's based on the clinical documentation in H+P.   Based on patients current state of illness, I anticipate that, after discharge, patient will require TBD.      Elisabeth Elizabeth MD  8/7/2024  1:50 PM

## 2024-08-07 NOTE — ED PROVIDER NOTES
Patient Seen in: [facility] [place]      History     Chief Complaint   Patient presents with    Fever     Stated Complaint: fever, chills, body aches    Subjective:   HPI    Patient with past medical history of A-fib, hypertension states 3 days ago he started having subjective fevers, chills, body aches and pains.  States he did not check his temperature at home but has been waking up in the middle of the night in the morning sweating and believes it was from breaking those fevers.  Denies congestion, sore throat, cough.  Denies chest pain or shortness of breath.  States that he has been intermittently feeling dizzy which he is attributed to laying down more due to not feeling well.  Denies syncope.  He has had decreased appetite but denies vomiting or diarrhea.  States that this feels similarly to when he had COVID a couple years ago and he went to a walk-in clinic this morning and was tested negative for COVID.  Patient also reports that several days ago he broke his right upper tooth.  He has not been having pain to the area.  He is scheduled to see his dentist in 2 weeks.  Denies any other complaints or concerns at this time.    Objective:   Past Medical History:    A-fib (HCC)    Arrhythmia    Coronary artery disease involving native coronary artery of native heart without angina pectoris    Essential hypertension    High blood pressure    High cholesterol    Hyperlipidemia              Past Surgical History:   Procedure Laterality Date    Colonoscopy      Other      right shoulder                 Social History     Socioeconomic History    Marital status: Single   Tobacco Use    Smoking status: Never    Smokeless tobacco: Never   Vaping Use    Vaping status: Never Used   Substance and Sexual Activity    Alcohol use: Yes     Comment: social    Drug use: Never   Other Topics Concern    Caffeine Concern Yes     Comment: 2 cups of coffee every morning and sometimes pop during the day.      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  Constitutional and vital signs reviewed.      All other systems reviewed and negative except as noted above.    Physical Exam     ED Triage Vitals [08/07/24 0913]   /84   Pulse 88   Resp 16   Temp 97.9 °F (36.6 °C)   Temp src Oral   SpO2 98 %   O2 Device None (Room air)       Current Vitals:   Vital Signs  BP: (!) 142/96  Pulse: 95  Resp: 18  Temp: (!) 102.9 °F (39.4 °C)  Temp src: Oral    Oxygen Therapy  SpO2: 98 %  O2 Device: None (Room air)            Physical Exam  Vitals and nursing note reviewed.   Constitutional:       Appearance: Normal appearance.   HENT:      Head: Normocephalic and atraumatic.      Right Ear: Tympanic membrane normal.      Left Ear: Tympanic membrane normal.      Mouth/Throat:      Mouth: Mucous membranes are moist.      Pharynx: No posterior oropharyngeal erythema.   Eyes:      Conjunctiva/sclera: Conjunctivae normal.   Cardiovascular:      Rate and Rhythm: Normal rate and regular rhythm.      Heart sounds: Murmur heard.   Pulmonary:      Effort: Pulmonary effort is normal. No respiratory distress.      Breath sounds: Normal breath sounds.   Musculoskeletal:         General: No swelling.      Cervical back: Normal range of motion and neck supple.   Skin:     General: Skin is warm and dry.   Neurological:      General: No focal deficit present.      Mental Status: He is alert.   Psychiatric:         Mood and Affect: Mood normal.               ED Course     Labs Reviewed   CBC WITH DIFFERENTIAL WITH PLATELET - Abnormal; Notable for the following components:       Result Value    HGB 12.9 (*)     HCT 38.3 (*)     .0 (*)     Lymphocyte Absolute 0.86 (*)     All other components within normal limits   COMP METABOLIC PANEL (14) - Abnormal;  Notable for the following components:    Glucose 115 (*)     Sodium 130 (*)     Calculated Osmolality 271 (*)     Albumin 3.2 (*)     A/G Ratio 0.7 (*)     All other components within normal limits   URINALYSIS WITH CULTURE REFLEX - Abnormal; Notable for the following components:    Blood Urine Moderate (*)     Protein Urine 30 mg/dL (*)     All other components within normal limits   UA MICROSCOPIC ONLY, URINE - Abnormal; Notable for the following components:    RBC Urine 3-5 (*)     All other components within normal limits   TROPONIN I HIGH SENSITIVITY - Normal   LACTIC ACID, PLASMA - Normal   BLOOD CULTURE   BLOOD CULTURE     EKG    Rate, intervals and axes as noted on EKG Report.  Rate: 84bpm  Rhythm: Sinus Rhythm  Reading: Sinus rhythm, no ST changes                 CT BRAIN OR HEAD (CPT=70450)    Result Date: 8/7/2024  PROCEDURE:  CT BRAIN OR HEAD (83231)  COMPARISON:  None.  INDICATIONS:  fever, chills, body aches  TECHNIQUE:  Noncontrast CT scanning is performed through the brain. Dose reduction techniques were used. Dose information is transmitted to the ACR (American College of Radiology) NRDR (National Radiology Data Registry) which includes the Dose Index Registry.  PATIENT STATED HISTORY: (As transcribed by Technologist)  Patient has had intermittent generalized headaches for 3 days, no trauma.    FINDINGS:   VENTRICLES/SULCI:   Ventricles and sulci are normal in size.   INTRACRANIAL:  There are no abnormal extraaxial fluid collections.  There is no midline shift. No evidence of acute intracranial hemorrhage.  SINUSES:  No significant inflammatory changes.  MASTOIDS:  The mastoids are clear.  SKULL:  No depressed calvarial fracture.            CONCLUSION:  No CT evidence for acute intracranial process.  LOCATION:  [id number]   Dictated by (CST): Ella Lane MD on 8/07/2024 at 10:37 AM     Finalized by (CST): Ella Lane MD on 8/07/2024 at 10:37 AM       XR CHEST AP PORTABLE  (CPT=71045)    Result  Date: 8/7/2024  PROCEDURE:  XR CHEST AP PORTABLE  (CPT=71045)  TECHNIQUE:  AP chest radiograph was obtained.  COMPARISON:  [facility], XR, XR CHEST PA + LAT CHEST (CPT=71020), 1/12/2017, 1:14 PM.  INDICATIONS:  fever, chills, body aches  PATIENT STATED HISTORY: (As transcribed by Technologist)  Fever, body aches and chills for 3 days.  Pt had a cardiac ablation in 01/2024.              CONCLUSION:  1. There is a large area of consolidative pneumonia in the anterior segment of the right upper lobe with slight depression of the horizontal fissure. 2. The left lung is clear.  No evidence of effusion.  No lymphadenopathy. 3. The heart size and vascularity are normal.  There is no CHF.    LOCATION:  [place] [id number]      Dictated by (CST): Gagan Coronado MD on 8/07/2024 at 10:19 AM     Finalized by (CST): Gagan Coronado MD on 8/07/2024 at 10:19 AM          I reviewed chest x-ray images personally and see large right upper lobe consolidation.       MDM      Differential diagnosis includes but is not limited to COVID, influenza, pneumonia, dehydration, ACS, CVA, infectious endocarditis.      Admission disposition: 8/7/2024 12:09 PM       Patient well-appearing, nontoxic.  Lungs CTA bilaterally.  Pulse ox 97% on room air and interpreted by me as normal.  Patient is in no respiratory distress, speaking in complete sentences without difficulty.  He had a negative COVID test done at walk-in clinic today.  Discussed case with Dr. Hooker who has seen and evaluated patient and orders placed as discussed.  Patient has noted to have a large consolidation to the right upper lobe noted on chest x-ray.  Unasyn and azithromycin initiated in the ER to address this.  Additionally given the recent history of broken tooth, there is concern for potential infectious endocarditis.  Patient is noted to have a murmur on exam which he states is chronic.  Plan is for admission for IV antibiotics and further evaluation, including echo.  Dr. Hooker  discussed case with the hospitalist and placed admission orders.                     Attending Attestation  I saw and evaluated the patient personally. I was present during the key/critical portions of the service performed by the advanced practitioner, discussed the case with the advanced practitioner and provided a substantive portion of the medical decision making. I reviewed the note, findings and plan, and agree or disagree as noted.  Dr. Sunshine Hooker            Medical Decision Making  Amount and/or Complexity of Data Reviewed  Labs:  Decision-making details documented in ED Course.  Radiology:  Decision-making details documented in ED Course.  ECG/medicine tests:  Decision-making details documented in ED Course.    Risk  Decision regarding hospitalization.        Disposition and Plan     Clinical Impression:  1. Community acquired pneumonia of right upper lobe of lung    2. Hyponatremia    3. Thrombocytopenia (HCC)         Disposition:  Admit  8/7/2024 12:09 pm    Follow-up:  No follow-up provider specified.        Medications Prescribed:  Current Discharge Medication List                            Hospital Problems       Present on Admission  Date Reviewed: 8/7/2024            ICD-10-CM Noted POA    * (Principal) Community acquired pneumonia of right upper lobe of lung J18.9 8/7/2024 Unknown

## 2024-08-07 NOTE — PROGRESS NOTES
CHIEF COMPLAINT:     Chief Complaint   Patient presents with    Fever     Body aches, chills headaches for 3 days.  OTC Tylenol was taken.         HPI:   Lopez Miranda is a 69 year old male who presents with complaints of feeling sick for the past 3 days.  Symptoms include subjective fever, chills, body aches, headache, and fatigue.  The patient did not take his temperature with a thermometer.  He last had Tylenol 30 minutes ago.  The patient denies nasal congestion, nasal drainage, sore throat, ear pain, cough, CP, SOB, wheezing, abdominal pain, vomiting, diarrhea, rash, and urinary symptoms.  The patient denies recent travel, sick contacts, and recent  COVID infection.       Current Outpatient Medications   Medication Sig Dispense Refill    Multiple Vitamin (MULTI-VITAMIN) Oral Tab multivitamin tablet, [RxNorm: 0]      METHIMAZOLE 10 MG Oral Tab TAKE 1.5 TABLETS (15 MG TOTAL) BY MOUTH IN THE MORNING AND 1.5 TABLETS (15 MG TOTAL) BEFORE BEDTIME. 270 tablet 0    apixaban 5 MG Oral Tab Take 1 tablet (5 mg total) by mouth 2 (two) times daily.      enalapril 5 MG Oral Tab Take 1 tablet (5 mg total) by mouth daily. 30 tablet 0    OMEPRAZOLE 20 MG Oral Capsule Delayed Release TAKE 1 CAPSULE BY MOUTH EVERY DAY 90 capsule 0    Rosuvastatin Calcium 10 MG Oral Tab Take 1 tablet (10 mg total) by mouth nightly.      aspirin 81 MG Oral Chew Tab Chew 2 tablets (162 mg total) by mouth daily.        Past Medical History:    A-fib (HCC)    Arrhythmia    Coronary artery disease involving native coronary artery of native heart without angina pectoris    Essential hypertension    High blood pressure    High cholesterol    Hyperlipidemia      Social History:  Social History     Socioeconomic History    Marital status: Single   Tobacco Use    Smoking status: Never    Smokeless tobacco: Never   Vaping Use    Vaping status: Never Used   Substance and Sexual Activity    Alcohol use: Yes     Comment: social    Drug use: Never   Other  Topics Concern    Caffeine Concern Yes     Comment: 2 cups of coffee every morning and sometimes pop during the day.     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  EYES: Denies blurred vision or double vision  HENT: Denies congestion, rhinorrhea, sore throat or ear pain  CHEST: Denies chest pain, or palpitations  LUNGS: Denies shortness of breath, cough, or wheezing  GI: Denies abdominal pain, N/V/C/D.   MUSCULOSKELETAL: no arthralgia or swollen joints  LYMPH:  Denies lymphadenopathy  NEURO: Denies headaches or lightheadedness      EXAM:   /82   Pulse 86   Temp 98.2 °F (36.8 °C) (Oral)   Resp 18   Ht 6' (1.829 m)   Wt 214 lb (97.1 kg)   SpO2 97%   BMI 29.02 kg/m²   GENERAL: well developed, well nourished,in no apparent distress, cooperative   SKIN: no rashes, nosuspicious lesions, no abnormal pigmentation  HEAD: atraumatic, normocephalic  EYES: EOM intact, PERRL.  Conjunctiva normal.  Cornea clear.  Lid margins normal.  No active drainage.  EARS: Right TM normal, no bulging, no retraction, no fluid, bony landmarks normal.  Left TM normal, no bulging, no retraction, no fluid, bony landmarks normal.    NOSE: nostrils patent, no discharge, nasal mucosa pink and not inflamed.  No sinus tenderness.  THROAT: oral mucosa pink, moist and intact. No visible dental caries. Posterior pharynx without erythema or exudates.  NECK: supple, non-tender.  LUNGS: clear to auscultation bilaterally, no wheezes or rhonchi. Breathing is non labored.  CARDIO: RRR with + murmur  GI: No visible scars, or masses. BS's present x4. No palpable masses or hepatosplenomegaly.  Non tender.  No guarding or rebound tenderness  EXTREMITIES: no cyanosis, clubbing or edema.  Homans NEG.  Dorsalis Pedis 2+.  LYMPH:  No lymphadenopathy.    NEURO: A&Ox3.  CN II-XII  intact.  No focal deficits.  Coordination and Gait normal.  Kernig and Brudzinski's are negative.    Rapid COVID is NEGATIVE    Discussed the limitations of the [facility].  Discussed monitoring symptoms and PCP follow up vs evaluation at a higher level of care.  Patient wants to go to a higher level of care for further testing.       ASSESSMENT AND PLAN:     ASSESSMENT:  Encounter Diagnoses   Name Primary?    Subjective fever Yes    Body aches        PLAN:    Patient Instructions   To IC or ER for further testing

## 2024-08-08 ENCOUNTER — APPOINTMENT (OUTPATIENT)
Dept: GENERAL RADIOLOGY | Facility: HOSPITAL | Age: 69
End: 2024-08-08
Attending: INTERNAL MEDICINE
Payer: COMMERCIAL

## 2024-08-08 VITALS
HEART RATE: 80 BPM | OXYGEN SATURATION: 96 % | DIASTOLIC BLOOD PRESSURE: 74 MMHG | WEIGHT: 200 LBS | RESPIRATION RATE: 18 BRPM | BODY MASS INDEX: 27.09 KG/M2 | SYSTOLIC BLOOD PRESSURE: 138 MMHG | TEMPERATURE: 99 F | HEIGHT: 72 IN

## 2024-08-08 LAB
ANION GAP SERPL CALC-SCNC: 10 MMOL/L (ref 0–18)
BASOPHILS # BLD AUTO: 0.02 X10(3) UL (ref 0–0.2)
BASOPHILS NFR BLD AUTO: 0.3 %
BUN BLD-MCNC: 11 MG/DL (ref 9–23)
CALCIUM BLD-MCNC: 8.7 MG/DL (ref 8.7–10.4)
CHLORIDE SERPL-SCNC: 101 MMOL/L (ref 98–112)
CO2 SERPL-SCNC: 23 MMOL/L (ref 21–32)
CREAT BLD-MCNC: 0.8 MG/DL
EGFRCR SERPLBLD CKD-EPI 2021: 96 ML/MIN/1.73M2 (ref 60–?)
EOSINOPHIL # BLD AUTO: 0.2 X10(3) UL (ref 0–0.7)
EOSINOPHIL NFR BLD AUTO: 3.3 %
ERYTHROCYTE [DISTWIDTH] IN BLOOD BY AUTOMATED COUNT: 12.3 %
GLUCOSE BLD-MCNC: 96 MG/DL (ref 70–99)
HCT VFR BLD AUTO: 33 %
HGB BLD-MCNC: 11.4 G/DL
IMM GRANULOCYTES # BLD AUTO: 0.04 X10(3) UL (ref 0–1)
IMM GRANULOCYTES NFR BLD: 0.7 %
L PNEUMO AG UR QL: NEGATIVE
LYMPHOCYTES # BLD AUTO: 0.77 X10(3) UL (ref 1–4)
LYMPHOCYTES NFR BLD AUTO: 12.8 %
MCH RBC QN AUTO: 30 PG (ref 26–34)
MCHC RBC AUTO-ENTMCNC: 34.5 G/DL (ref 31–37)
MCV RBC AUTO: 86.8 FL
MONOCYTES # BLD AUTO: 0.79 X10(3) UL (ref 0.1–1)
MONOCYTES NFR BLD AUTO: 13.1 %
NEUTROPHILS # BLD AUTO: 4.19 X10 (3) UL (ref 1.5–7.7)
NEUTROPHILS # BLD AUTO: 4.19 X10(3) UL (ref 1.5–7.7)
NEUTROPHILS NFR BLD AUTO: 69.8 %
OSMOLALITY SERPL CALC.SUM OF ELEC: 277 MOSM/KG (ref 275–295)
PLATELET # BLD AUTO: 112 10(3)UL (ref 150–450)
POTASSIUM SERPL-SCNC: 3.5 MMOL/L (ref 3.5–5.1)
POTASSIUM SERPL-SCNC: 4.1 MMOL/L (ref 3.5–5.1)
RBC # BLD AUTO: 3.8 X10(6)UL
SODIUM SERPL-SCNC: 134 MMOL/L (ref 136–145)
STREP PNEUMO ANTIGEN, URINE: NEGATIVE
WBC # BLD AUTO: 6 X10(3) UL (ref 4–11)

## 2024-08-08 PROCEDURE — 71046 X-RAY EXAM CHEST 2 VIEWS: CPT | Performed by: INTERNAL MEDICINE

## 2024-08-08 PROCEDURE — 99239 HOSP IP/OBS DSCHRG MGMT >30: CPT | Performed by: INTERNAL MEDICINE

## 2024-08-08 RX ORDER — POTASSIUM CHLORIDE 20 MEQ/1
40 TABLET, EXTENDED RELEASE ORAL EVERY 4 HOURS
Status: COMPLETED | OUTPATIENT
Start: 2024-08-08 | End: 2024-08-08

## 2024-08-08 RX ORDER — AMOXICILLIN AND CLAVULANATE POTASSIUM 875; 125 MG/1; MG/1
1 TABLET, FILM COATED ORAL 2 TIMES DAILY
Qty: 14 TABLET | Refills: 0 | Status: SHIPPED | OUTPATIENT
Start: 2024-08-08 | End: 2024-08-15

## 2024-08-08 NOTE — HISTORICAL OFFICE NOTE
[facility]  [address] [place] [address], 4th [place] [address]  [phone]      JAVIER Miranda  Progress Note  Demographics:  Name: JAVIER Miranda YOB: 1955  Age: 69, Male Medical Record No: [id number]  Visited Date/Time: 07/25/2024 03:50 PM    Chief Complaints  3 month follow up  History of Present Illness  68-year-old male with hypertension who is followed with a cardiologist in our group every year for many years due to strong family history of heart disease.  He reports that in January he went for his [employer] evaluation and was found to be in A. fib and had not been seeing a cardiologist for probably 1 to 2 years due to Covid.  He saw my partner Dr. Rucker and was noted to be in A. fib and was started on anticoagulation with Eliquis and is on no other rate controlling medications at this time    We reattempted a cardioversion after a longer period of loading with amiodarone and he is now maintaining sinus rhythm.  He does not have a significant improvement in any symptoms since he is relatively asymptomatic but does feel that he has more energy now    Current visit: February 15, 2024  - Patient had a cryo PVI RF CTI January 24, 2024  - Doing very well.    Wants to golf and fly    Visit April 25, 2024  - Patient is doing well.  Dropped his amiodarone 100 today no A-fib.    Visit July 25 , 2024  Patient stopped his amiodarone last visit.  No A-fib.  Doing very well  Cardiac risk factors Dyslipidemia and Never smoked  Past Medical History  1.Pre-procedure lab exam  2.No pertinent past surgical history  3.Atrial fibrillation (Afib), paroxysmal - A Fib  4.BACON (dyspnea on exertion)  5.Chest pain, precordial - CP  6.Pure hypercholesterolemia  7.Nonrheumatic aortic valve stenosis  8.Coronary artery disease involving coronary bypass graft of native heart without angina pectoris  9.Hyperthyroidism  10.Thrombocytopenia  11.Elevated alkaline phosphatase  level  12.Family history of early CAD -FHx  13.Asymptomatic bilateral carotid artery stenosis  14.Nonrheumatic aortic valve stenosis  15.Pure hypercholesterolemia  16.Chest pain, precordial  17.Family history of early CAD  18.Carotid stenosis, asymptomatic, bilateral  19.Coronary artery disease involving native coronary artery of native heart without angina pectoris  Past Surgical History  1.No pertinent past surgical history  Family History  1. Father - CAD native (coronary artery disease); Acute myocardial infarction, unspecified; Serum lipids high  2. Mother - CAD native (coronary artery disease); Acute myocardial infarction, unspecified; Serum lipids high  3. Brother - CAD native (coronary artery disease); Acute myocardial infarction, unspecified; Serum lipids high  Social History  Smoking status Never smoked  Tobacco usage - No (Non-smoker for personal reasons (finding))  Review of systems  Cardiovascular No history of Chest pain, BACON, Palpitations, Syncope, PND, Orthopnea, Edema and Claudication  Physical Examination  Vitals Right Arm Sitting  / 72 mmHg, Pulse rate 65 bpm, Height in 6' 0\", BMI: 29.8, Weight in 220 lbs (or) 99.79 kgs and BSA : 2.27 cc/m²  General Appearance No Acute Distress, Well groomed and Normal Body habitus  Cardiovascular   EKG/Other abnormalities  General - NAD  PEERLA/EOMI  JVD - normal  CTA Bilaterally  CV- RRR, S1/S2, No murmurs  GI- Soft, Nontender  Extremities normal pulses  Mood/Affect Congruent  Skin no lesions  Joint/Musculoskeletal - Normal  Allergies  No medication allergies noted.  Medications  1.aspirin 81 mg chewable tablet, Take 1 tablet orally 2 times a day.  2.Eliquis 5 mg tablet, Take 1 tablet orally 2 times a day for 30 days.  3.ENALAPRIL MALEATE 5 MG TABLET, TAKE 1 TABLET BY MOUTH EVERY DAY  4.methIMAzole 10 mg tablet, Take one and a half tablet orally 2 times a day.  5.multivitamin tablet, Take 1 tablet orally once a day.  6.OMEPRAZOLE DR 20 MG CAPSULE, TAKE 1  CAPSULE BY MOUTH ONCE A DAY  7.ROSUVASTATIN CALCIUM 10 MG TAB, TAKE 1 TABLET BY MOUTH EVERY DAY  Impression  1.Pre-procedure lab exam  2.No pertinent past surgical history  3.Atrial fibrillation (Afib), paroxysmal - A Fib  4.BACON (dyspnea on exertion)  5.Chest pain, precordial - CP  6.Pure hypercholesterolemia  7.Nonrheumatic aortic valve stenosis  8.Coronary artery disease involving coronary bypass graft of native heart without angina pectoris  9.Hyperthyroidism  10.Thrombocytopenia  11.Elevated alkaline phosphatase level  12.Family history of early CAD -FHx  13.Asymptomatic bilateral carotid artery stenosis  14.Nonrheumatic aortic valve stenosis  15.Pure hypercholesterolemia  16.Chest pain, precordial  17.Family history of early CAD  18.Carotid stenosis, asymptomatic, bilateral  19.Coronary artery disease involving native coronary artery of native heart without angina pectoris  Assessment & Plan  Cardiac Testing Personally Reviewed    ECG Reviewed normal sinus rhythm    Echo Results normal ejection fraction    Nuclear stress test showed no perfusion abnormalities    Monitor Results 100% A. fib 47% of the time it was rapid ventricular response    EP procedures: January 24, 2024-RF PVI/RF CTI    #Early persistent atrial fibrillation  -Chads vas score of 1 for age.  Patient is currently on Eliquis  -Patient has normal perfusion on PET scan as well as normal EF.  - Currently maintaining sinus rhythm.  - Off amiodarone since my last visit  - I would like him to do a 7-day MCT monitor in 5 months and follow-up with me in 6 months  Labs and Diagnostics ordered  1.EKG (electrocardiogram) (Today)  2.Monitor - MCT/Telemetry (5 Months)  Future appointments  1.Follow up visit - Zoltan Lara MD (6 Months)  Nurses documentation  Refills: none  Upcoming surgeries: none  Use of assistive device: none  Ekg per verbal order   MB, CMA      Patient instructions  7 day monitor prior to visit   Follow up in 6 months   Lab  Details  CBC W/ DIFFERENTIAL  12/23/2023 04:10:46 PM  WBC 4.7 4.0-11.0 x10(3) uL  F  RED BLOOD COUNT 4.96 3.80-5.80 x10(6)uL  F  HGB 14.4 13.0-17.5 g/dL  F  HCT 42.6 39.0-53.0 %  F  PLATELETS 175.0 150.0-450.0 10(3)uL  F  MEAN CELL VOLUME 85.9 80.0-100.0 fL  F  MEAN CORPUSCULAR HEMOGLOBIN 29.0 26.0-34.0 pg  F  MEAN CORPUSCULAR HGB CONC 33.8 31.0-37.0 g/dL  F  RED CELL DISTRIBUTION WIDTH CV 12.4 %  F  NEUTROPHIL ABS PRELIM 3.07 1.50-7.70 x10 (3) uL  F  NEUTROPHIL ABSOLUTE 3.07 1.50-7.70 x10(3) uL  F  LYMPHOCYTE ABSOLUTE 1.04 1.00-4.00 x10(3) uL  F  MONOCYTE ABSOLUTE 0.42 0.10-1.00 x10(3) uL  F  EOSINOPHIL ABSOLUTE 0.17 0.00-0.70 x10(3) uL  F  BASOPHIL ABSOLUTE 0.02 0.00-0.20 x10(3) uL  F  IMMATURE GRANULOCYTE COUNT 0.01 0.00-1.00 x10(3) uL  F  NEUTROPHIL % 64.9 %  F  LYMPHOCYTE % 22.0 %  F  MONOCYTE % 8.9 %  F  EOSINOPHIL % 3.6 %  F  BASOPHIL % 0.4 %  F  IMMATURE GRANULOCYTE RATIO % 0.2 %  F  BASIC METABOLIC PANEL (8)  12/23/2023 04:00:04 PM  GLUCOSE 100 70-99 mg/dL H F  SODIUM 140 136-145 mmol/L  F  POTASSIUM 4.3 3.5-5.1 mmol/L  F  CHLORIDE 109  mmol/L  F  CO2 25.0 21.0-32.0 mmol/L  F  ANION GAP 6 0-18 mmol/L  F  BUN 11 9-23 mg/dL  F  CREATININE 0.91 0.70-1.30 mg/dL  F  CALCIUM 9.4 8.5-10.1 mg/dL  F  OSMOLALITY CALCULATED 289 275-295 mOsm/kg  F  E GFR CR 92 >=60 mL/min/1.73m2  F  FASTING PATIENT BMP ANSWER Yes   F  POCT CREATININE  12/22/2023 02:01:51 PM  POCT CREATININE 0.80 0.70-1.30 mg/dL  F  E GFR CR 97 >=60 mL/min/1.73m2  F  Diagnostics Details  Nuclear PET 03/17/2022  1.Stress EKG is normal.    1.This is a normal perfusion study, no perfusion defects noted.    2.The left ventricular cavity is noted to be mildly enlarged on the stress studies. The stress left ventricular ejection fraction was calculated to be 51% and left ventricular global function is normal. The rest left ventricular cavity is noted to be normal. The rest left ventricular ejection fraction was calculated to be 48% and rest left  ventricular global function is normal. The LVEF's may be underestimated due to the patient's baseline atrial fibrillation.    Trans Thoracic Echocardiogram 02/18/2022  1.The study quality is average.    2.The left ventricle is normal in size.. Global left ventricular systolic function is normal. The left ventricular ejection fraction is 55-60%.    3.The left atrial diameter is mildly increased.    4.Mitral regurgitation is noted. Mild (1+) mitral regurgitation.    5. There is moderate calcification of the aortic valve. Mild (1+) aortic regurgitation. The trans-aortic mean gradient is 23.0 mmHg consistent with mild to moderate AS, unchanged from previous exam.    6.Mild (1+) tricuspid regurgitation.    7.The pulmonary artery systolic pressure is 29 mmHg.    Carotid Ultrasound 02/18/2022  1.The study quality is average.    2.1-39% stenosis in the mid right internal carotid artery based on Bluth Criteria.    3.1-39% stenosis in the mid left internal carotid artery based on Bluth Criteria.    4.Antegrade right vertebral artery flow.    5.Antegrade left vertebral artery flow.    ACTMonitoring 02/18/2022  1.This is an average quality study.    2.Predominant rhythm is atrial fibrillation.    3.The minimum heart rate recorded was 70 beats / minute. The maximum heart rate is 180 beats / minute. The mean heart rate is 101 beats / minute.    4.No evidence of AV block is noted.    5.No evidence of supraventricular tachycardia is noted.    6.AF [place] 99%    7.Rare premature ventricular contractions noted.    8.No evidence of ventricular tachycardia is noted.    9.No pauses were noted.    10.Short NSVT episodes noted * No Supraventricular Tachycardia. * No Pauses. * No Third Degree AV Block or Second Degree AV Block Type II. * 2 patient triggered events, 2 had symptoms specified    CPOE Orders carried out by: Patito Flores  Care Providers: Zlotan Lara MD, Patito Whalen and Jody Flores  Electronically  Authenticated by  Zoltan Lara MD  07/25/2024 04:00:02 PM  Disclaimer: Components of this note were documented using voice recognition system and are subject to errors not corrected at proofreading. Contact the author of this note for any clarifications.

## 2024-08-08 NOTE — PLAN OF CARE
IVABX  Potassium replaced per protocol  Plt lower today  Improved respiratory status  Possible dc today

## 2024-08-08 NOTE — PAYOR COMM NOTE
--------------  ADMISSION REVIEW   8/7  Payor: [employer]  Subscriber #:  [id number]  Authorization Number: [id number]    Admit date: 8/7/24  Admit time:  1:47 PM       REVIEW DOCUMENTATION:     ED Provider Notes        ED Provider Notes signed by Yadi Donato PA at 8/7/2024  1:13 PM       Author: Yadi Donato PA Service: -- Author Type: Physician Assistant    Filed: 8/7/2024  1:13 PM Date of Service: 8/7/2024 10:37 AM Status: Cosign Needed    : Yadi Donato PA (Physician Assistant) Cosign Required: Yes           Patient Seen in: [facility] [place]      History     Chief Complaint   Patient presents with    Fever     Stated Complaint: fever, chills, body aches    Subjective:   HPI    Patient with past medical history of A-fib, hypertension states 3 days ago he started having subjective fevers, chills, body aches and pains.  States he did not check his temperature at home but has been waking up in the middle of the night in the morning sweating and believes it was from breaking those fevers.  Denies congestion, sore throat, cough.  Denies chest pain or shortness of breath.  States that he has been intermittently feeling dizzy which he is attributed to laying down more due to not feeling well.  Denies syncope.  He has had decreased appetite but denies vomiting or diarrhea.  States that this feels similarly to when he had COVID a couple years ago and he went to a walk-in clinic this morning and was tested negative for COVID.  Patient also reports that several days ago he broke his right upper tooth.  He has not been having pain to the area.  He is scheduled to see his dentist in 2 weeks.  Denies any other complaints or concerns at this time.    Objective:   Past Medical History:    A-fib (HCC)    Arrhythmia    Coronary artery disease involving native coronary artery of native heart without angina pectoris    Essential hypertension    High blood pressure    High  cholesterol    Hyperlipidemia     Positive for stated Chief Complaint: Fever    Other systems are as noted in HPI.  Constitutional and vital signs reviewed.      All other systems reviewed and negative except as noted above.    Physical Exam     ED Triage Vitals [08/07/24 0913]   /84   Pulse 88   Resp 16   Temp 97.9 °F (36.6 °C)   Temp src Oral   SpO2 98 %   O2 Device None (Room air)       Current Vitals:   Vital Signs  BP: (!) 142/96  Pulse: 95  Resp: 18  Temp: (!) 102.9 °F (39.4 °C)  Temp src: Oral    Oxygen Therapy  SpO2: 98 %  O2 Device: None (Room air)            Physical Exam  Vitals and nursing note reviewed.   Constitutional:       Appearance: Normal appearance.   HENT:      Head: Normocephalic and atraumatic.      Right Ear: Tympanic membrane normal.      Left Ear: Tympanic membrane normal.      Mouth/Throat:      Mouth: Mucous membranes are moist.      Pharynx: No posterior oropharyngeal erythema.   Eyes:      Conjunctiva/sclera: Conjunctivae normal.   Cardiovascular:      Rate and Rhythm: Normal rate and regular rhythm.      Heart sounds: Murmur heard.   Pulmonary:      Effort: Pulmonary effort is normal. No respiratory distress.      Breath sounds: Normal breath sounds.   Musculoskeletal:         General: No swelling.      Cervical back: Normal range of motion and neck supple.   Skin:     General: Skin is warm and dry.   Neurological:      General: No focal deficit present.      Mental Status: He is alert.   Psychiatric:         Mood and Affect: Mood normal.       Labs Reviewed   CBC WITH DIFFERENTIAL WITH PLATELET - Abnormal; Notable for the following components:       Result Value    HGB 12.9 (*)     HCT 38.3 (*)     .0 (*)     Lymphocyte Absolute 0.86 (*)     All other components within normal limits   COMP METABOLIC PANEL (14) - Abnormal; Notable for the following components:    Glucose 115 (*)     Sodium 130 (*)     Calculated Osmolality 271 (*)     Albumin 3.2 (*)     A/G Ratio 0.7 (*)      All other components within normal limits   URINALYSIS WITH CULTURE REFLEX - Abnormal; Notable for the following components:    Blood Urine Moderate (*)     Protein Urine 30 mg/dL (*)     All other components within normal limits   UA MICROSCOPIC ONLY, URINE - Abnormal; Notable for the following components:    RBC Urine 3-5 (*)     All other components within normal limits   TROPONIN I HIGH SENSITIVITY - Normal   LACTIC ACID, PLASMA - Normal   BLOOD CULTURE   BLOOD CULTURE     EKG    Rate, intervals and axes as noted on EKG Report.  Rate: 84bpm  Rhythm: Sinus Rhythm  Reading: Sinus rhythm, no ST changes       CT BRAIN OR HEAD (CPT=70450)    Result Date: 8/7/2024  PROCEDURE:  CT BRAIN OR HEAD (98395)  COMPARISON:  None.  INDICATIONS:  fever, chills, body aches  TECHNIQUE:  Noncontrast CT scanning is performed through the brain. Dose reduction techniques were used. Dose information is transmitted to the ACR (American College of Radiology) NRDR (National Radiology Data Registry) which includes the Dose Index Registry.  PATIENT STATED HISTORY: (As transcribed by Technologist)  Patient has had intermittent generalized headaches for 3 days, no trauma.    FINDINGS:   VENTRICLES/SULCI:   Ventricles and sulci are normal in size.   INTRACRANIAL:  There are no abnormal extraaxial fluid collections.  There is no midline shift. No evidence of acute intracranial hemorrhage.  SINUSES:  No significant inflammatory changes.  MASTOIDS:  The mastoids are clear.  SKULL:  No depressed calvarial fracture.            CONCLUSION:  No CT evidence for acute intracranial process.  LOCATION:  [id number]   Dictated by (CST): Ella Lane MD on 8/07/2024 at 10:37 AM     Finalized by (CST): Ella Lane MD on 8/07/2024 at 10:37 AM       XR CHEST AP PORTABLE  (CPT=71045)    Result Date: 8/7/2024  PROCEDURE:  XR CHEST AP PORTABLE  (CPT=71045)  TECHNIQUE:  AP chest radiograph was obtained.  COMPARISON:  [facility], XR, XR CHEST PA + LAT CHEST  (CPT=71020), 1/12/2017, 1:14 PM.  INDICATIONS:  fever, chills, body aches  PATIENT STATED HISTORY: (As transcribed by Technologist)  Fever, body aches and chills for 3 days.  Pt had a cardiac ablation in 01/2024.              CONCLUSION:  1. There is a large area of consolidative pneumonia in the anterior segment of the right upper lobe with slight depression of the horizontal fissure. 2. The left lung is clear.  No evidence of effusion.  No lymphadenopathy. 3. The heart size and vascularity are normal.  There is no CHF.    LOCATION:  [place] [address]      Dictated by (CST): Gagan Coronado MD on 8/07/2024 at 10:19 AM     Finalized by (CST): Gagan Coronado MD on 8/07/2024 at 10:19 AM          I reviewed chest x-ray images personally and see large right upper lobe consolidation.      MDM      Differential diagnosis includes but is not limited to COVID, influenza, pneumonia, dehydration, ACS, CVA, infectious endocarditis.      Admission disposition: 8/7/2024 12:09 PM       Patient well-appearing, nontoxic.  Lungs CTA bilaterally.  Pulse ox 97% on room air and interpreted by me as normal.  Patient is in no respiratory distress, speaking in complete sentences without difficulty.  He had a negative COVID test done at walk-in clinic today.  Discussed case with Dr. Hooker who has seen and evaluated patient and orders placed as discussed.  Patient has noted to have a large consolidation to the right upper lobe noted on chest x-ray.  Unasyn and azithromycin initiated in the ER to address this.  Additionally given the recent history of broken tooth, there is concern for potential infectious endocarditis.  Patient is noted to have a murmur on exam which he states is chronic.  Plan is for admission for IV antibiotics and further evaluation, including echo.  Dr. Hooker discussed case with the hospitalist and placed admission orders     Medical Decision Making  Amount and/or Complexity of Data Reviewed  Labs:  Decision-making details  documented in ED Course.  Radiology:  Decision-making details documented in ED Course.  ECG/medicine tests:  Decision-making details documented in ED Course.    Risk  Decision regarding hospitalization.        Disposition and Plan     Clinical Impression:  1. Community acquired pneumonia of right upper lobe of lung    2. Hyponatremia    3. Thrombocytopenia (HCC)         Disposition:  Admit  8/7/2024 12:09 pm     Hospital Problems       Present on Admission  Date Reviewed: 8/7/2024            ICD-10-CM Noted POA    * (Principal) Community acquired pneumonia of right upper lobe of lung J18.9 8/7/2024 Unknown                 8/7 H&P    Chief Complaint: Fever, chills, body aches     History of Present Illness:  Lopez Miranda is a 69 year old male admitted with fever, chills, body aches.  Symptoms started on Sunday night according to patient.  Fever with chills.  Denies any associated chest pain.  Denies any shortness of breath.  Denies any cold or cough symptoms.  Denies any nausea vomiting.  Denies any sick contact.  Denies any diarrhea constipation.  Denies any abdominal pain.  Patient states he has had a chronic heart murmur.  Has history of A-fib and CAD and hypertension and hyperlipidemia.       Vital signs: Blood pressure (!) 142/96, pulse 95, temperature (!) 102.9 °F (39.4 °C), temperature source Oral, resp. rate 18, height 6' (1.829 m), weight 200 lb (90.7 kg), SpO2 98%.  General: No acute distress.   Respiratory: Clear to auscultation bilaterally.  No wheezes. No rhonchi.  Cardiovascular: S1, S2.  Regular rate and rhythm.  Systolic murmur in the aortic area. Equal pulses   Abdomen: Soft, nontender, nondistended.  Positive bowel sounds.  Neurologic: Awake alert, no focal neurological deficits.  Musculoskeletal: Full range of motion of all extremities.  No pedal edema or calf tenderness  Psychiatric: Appropriate mood and affect.        Diagnostic Data:       Laboratory Data:         Lab Results   Component  Value Date     WBC 9.1 08/07/2024     HGB 12.9 08/07/2024     HCT 38.3 08/07/2024     .0 08/07/2024     CREATSERUM 0.96 08/07/2024     BUN 12 08/07/2024      08/07/2024     K 3.9 08/07/2024     CL 99 08/07/2024     CO2 25.0 08/07/2024      08/07/2024     CA 9.1 08/07/2024     ALB 3.2 08/07/2024     ALKPHO 81 08/07/2024     BILT 0.9 08/07/2024     TP 7.6 08/07/2024     AST 15 08/07/2024     ALT 18 08/07/2024          Recent Labs   Lab 08/07/24  0938   TROPHS 20         Imaging:  Imaging data reviewed in Epic.  Chest x-ray shows  CONCLUSION:    1. There is a large area of consolidative pneumonia in the anterior segment of the right upper lobe with slight depression of the horizontal fissure.   2. The left lung is clear.  No evidence of effusion.  No lymphadenopathy.   3. The heart size and vascularity are normal.  There is no CHF.          ASSESSMENT / PLAN:      Right upper lobe pneumonia with fever and chills  IV antibiotics  Follow blood culture  Follow sputum culture  Check urine Legionella and streptococcal antigen  MRSA screen by PCR negative  COVID screening negative  Tylenol as needed fever  Robitussin as needed  Mucinex twice daily  Lactic acidosis normal at 1.8  Chest x-ray done on admission showed large area of consolidative pneumonia in the right upper lobe with slight depression of horizontal fissure, rule out Klebsiella pneumonia  Hypertension  Continue home enalapril  Follow blood pressure  Hyperlipidemia  Continue home statin  Paroxysmal atrial fibrillation  Currently normal sinus rhythm  Continue home apixaban  CAD  Monitor telemetry  Continue home aspirin and statin  Hyperthyroidism  Patient takes methimazole at home which was continued  GERD  Continue PPI  Chronic heart murmur, history of nonrheumatic aortic valve stenosis  Check 2D echo  Hyponatremia due to pneumonia  Follow BMP in a.m.  Mild thrombocytopenia  Follow CBC in a.m.                 MEDICATIONS ADMINISTERED IN LAST 1  DAY:  acetaminophen (Tylenol Extra Strength) tab 500 mg       Date Action Dose Route User    8/8/2024 1303 Given 500 mg Oral Maggie Karimi RN          ampicillin-sulbactam (Unasyn) 3 g in sodium chloride 0.9% 100mL IVPB-ADD       Date Action Dose Route User    8/8/2024 1303 New Bag 3 g Intravenous Maggie Karimi RN    8/8/2024 0548 New Bag 3 g Intravenous Cl Christie RN    8/7/2024 2311 New Bag 3 g Intravenous Cl Christie RN    8/7/2024 1800 New Bag 3 g Intravenous Maggie Karimi RN          apixaban (Eliquis) tab 5 mg       Date Action Dose Route User    8/8/2024 0756 Given 5 mg Oral Maggie Karimi RN    8/7/2024 1957 Given 5 mg Oral Cl Christie RN          aspirin chewable tab 162 mg       Date Action Dose Route User    8/8/2024 0755 Given 162 mg Oral Maggie Karimi RN          enalapril (Vasotec) tab 5 mg       Date Action Dose Route User    8/8/2024 0756 Given 5 mg Oral Maggie Karimi RN          guaiFENesin ER (Mucinex) 12 hr tab 600 mg       Date Action Dose Route User    8/8/2024 0755 Given 600 mg Oral Maggie Karimi RN    8/7/2024 1957 Given 600 mg Oral Cl Christie RN          pantoprazole (Protonix) DR tab 20 mg       Date Action Dose Route User    8/8/2024 0548 Given 20 mg Oral Cl Christie RN          potassium chloride (Klor-Con M20) tab 40 mEq       Date Action Dose Route User    8/8/2024 1303 Given 40 mEq Oral Maggie Karimi RN    8/8/2024 0829 Given 40 mEq Oral Maggie Karimi RN          rosuvastatin (Crestor) tab 10 mg       Date Action Dose Route User    8/7/2024 1958 Given 10 mg Oral Cl Christie RN          methIMAzole (Tapazole) tab 15 mg       Date Action Dose Route User    8/8/2024 0755 Given 15 mg Oral Maggie Karimi RN    8/7/2024 1958 Given 15 mg Oral Cl Christie RN            Vitals (last day)       Date/Time Temp Pulse Resp BP SpO2 Weight O2 Device O2 Flow Rate (L/min) [facility]    08/08/24 1124 99.2 °F (37.3 °C) 80 16 132/83 99 % -- None (Room air) -- ND     08/08/24 0743 98.6 °F (37 °C) 91 16 135/72 99 % -- None (Room air) -- ND    08/08/24 0530 98 °F (36.7 °C) 89 16 125/68 99 % -- None (Room air) -- LP    08/07/24 2031 -- 87 -- -- -- -- -- -- LP    08/07/24 2000 98.2 °F (36.8 °C) 89 16 134/75 99 % -- None (Room air) -- LP    08/07/24 1636 98.3 °F (36.8 °C) 78 16 123/73 92 % -- None (Room air) -- TD    08/07/24 1413 -- -- -- -- -- 200 lb (90.7 kg) -- -- CD    08/07/24 1356 100 °F (37.8 °C) 89 18 112/66 95 % -- None (Room air) -- CD    08/07/24 1250 102.9 °F (39.4 °C) -- -- -- -- -- -- -- CM    08/07/24 1247 100.3 °F (37.9 °C) 95 18 142/96 -- -- -- -- CM    08/07/24 1225 100.3 °F (37.9 °C) 95 18 142/96 98 % -- None (Room air) -- CM    08/07/24 1139 -- 94 18 138/82 99 % -- None (Room air) -- CM    08/07/24 1046 100.2 °F (37.9 °C) 89 16 139/65 99 % -- None (Room air) -- CM    08/07/24 0913 97.9 °F (36.6 °C) 88 16 143/84 98 % 200 lb (90.7 kg) None (Room air) -- RS          CIWA Scores (since admission)       None

## 2024-08-08 NOTE — PLAN OF CARE
Assumed care at 1930  Recd pt on bed, on room air.  Denies any complaints.  Afebrile.  POC updated.  IV antibiotics  UA and sputum sent.  Problem: RESPIRATORY - ADULT  Goal: Achieves optimal ventilation and oxygenation  Description: INTERVENTIONS:  - Assess for changes in respiratory status  - Assess for changes in mentation and behavior  - Position to facilitate oxygenation and minimize respiratory effort  - Oxygen supplementation based on oxygen saturation or ABGs  - Provide Smoking Cessation handout, if applicable  - Encourage broncho-pulmonary hygiene including cough, deep breathe, Incentive Spirometry  - Assess the need for suctioning and perform as needed  - Assess and instruct to report SOB or any respiratory difficulty  - Respiratory Therapy support as indicated  - Manage/alleviate anxiety  - Monitor for signs/symptoms of CO2 retention  Outcome: Progressing     Problem: Patient/Family Goals  Goal: Patient/Family Long Term Goal  Description: Patient's Long Term Goal:go home    Interventions:  -IV antibiotic  -monitor O2 sats, respiratory status  - See additional Care Plan goals for specific interventions  Outcome: Progressing  Goal: Patient/Family Short Term Goal  Description: Patient's Short Term Goal: able to rest/sleep    Interventions:   - cluster care  -quiet and dim room  -needs attended  - See additional Care Plan goals for specific interventions  Outcome: Progressing

## 2024-08-08 NOTE — PROGRESS NOTES
[facility]   part of [facility]     Hospitalist Progress Note     Lopez Miranda Patient Status:  Inpatient    1955 MRN [id number]   Location [facility] 0SW-A Attending Elisabeth Elizabeth MD   Hosp Day # 1 PCP Maria Elena Serrano MD     Chief Complaint: Fever, chills, body aches    Subjective:     Patient states he is starting to feel better.  Had a headache today.  Patient states he drinks a lot of coffee at home and has not had any coffee for the last 2 to 3 days.  Possible caffeine withdrawal.  Has low-grade fever 99.2 °F.  Remains on room air.  Starting to bring up some phlegm with the Mucinex according to patient.    Objective:    Review of Systems:   A comprehensive review of systems was completed; pertinent positive and negatives stated in subjective.    Vital signs:  Temp:  [98 °F (36.7 °C)-99.2 °F (37.3 °C)] 99.2 °F (37.3 °C)  Pulse:  [78-91] 80  Resp:  [16] 16  BP: (123-135)/(68-83) 132/83  SpO2:  [92 %-99 %] 99 %    Physical Exam:    /83 (BP Location: Right arm)   Pulse 80   Temp 99.2 °F (37.3 °C) (Oral)   Resp 16   Ht 6' (1.829 m)   Wt 200 lb (90.7 kg)   SpO2 99%   BMI 27.12 kg/m²     General: No acute distress.   Respiratory: Clear to auscultation bilaterally.  No wheezes. No rhonchi.  Cardiovascular: S1, S2.  Regular rate and rhythm.  Systolic murmur in the aortic area. Equal pulses   Abdomen: Soft, nontender, nondistended.  Positive bowel sounds.  Neurologic: Awake alert, no focal neurological deficits.  Musculoskeletal: Full range of motion of all extremities.  No pedal edema or calf tenderness  Psychiatric: Appropriate mood and affect.    Diagnostic Data:    Labs:  Recent Labs   Lab 24  0937 24  0634   WBC 9.1 6.0   HGB 12.9* 11.4*   MCV 88.9 86.8   .0* 112.0*       Recent Labs   Lab 24  0938 24  0634   * 96   BUN 12 11   CREATSERUM 0.96 0.80   CA 9.1 8.7   ALB 3.2*  --    * 134*   K 3.9 3.5   CL 99 101   CO2 25.0 23.0   ALKPHO  81  --    AST 15  --    ALT 18  --    BILT 0.9  --    TP 7.6  --        Estimated Creatinine Clearance: 97 mL/min (based on SCr of 0.8 mg/dL).    Recent Labs   Lab 08/07/24  0938   TROPHS 20       No results for input(s): \"PTP\", \"INR\" in the last 168 hours.               Microbiology    No results found for this visit on 08/07/24.      Imaging: Reviewed in Epic.    Medications:    azithromycin  500 mg Intravenous Q24H    ampicillin-sulbactam  3 g Intravenous q6h    guaiFENesin ER  600 mg Oral BID    apixaban  5 mg Oral BID    aspirin  162 mg Oral Daily    enalapril  5 mg Oral Daily    methIMAzole  15 mg Oral BID    pantoprazole  20 mg Oral QAM AC    rosuvastatin  10 mg Oral Nightly       Assessment & Plan:      Right upper lobe pneumonia with fever and chills  Continue IV antibiotics   blood culture pending   sputum culture with contaminant   urine Legionella and streptococcal antigen negative  MRSA screen by PCR negative  COVID screening negative  Tylenol as needed fever  Robitussin as needed  Mucinex twice daily  Lactic acidosis normal at 1.8  Chest x-ray done on admission showed large area of consolidative pneumonia in the right upper lobe with slight depression of horizontal fissure, rule out Klebsiella pneumonia  Hypertension  Continue home enalapril  Follow blood pressure  Hyperlipidemia  Continue home statin  Paroxysmal atrial fibrillation  Currently normal sinus rhythm  Continue home apixaban  CAD  Monitor telemetry  Continue home aspirin and statin  Hyperthyroidism  Patient takes methimazole at home which was continued  GERD  Continue PPI  Chronic heart murmur, severe nonrheumatic aortic valve stenosis   2D echo with EF of 65 to 70%.  No regional wall motion abnormalities.severe aortic stenosis-worsening.no diagnostic evidence of endocarditis.  Cards consult  Hyponatremia due to pneumonia  Follow BMP in a.m.  Mild thrombocytopenia  Follow CBC in a.m.    D/w pt. D/w rn.    Elisabeth Elizabeth MD    Supplementary  Documentation:     Quality:  DVT Mechanical Prophylaxis:   SCDs,    DVT Pharmacologic Prophylaxis   Medication    apixaban (Eliquis) tab 5 mg      DVT Pharmacologic prophylaxis: Aspirin 162 mg         Code Status: Not on file  Rivers: No urinary catheter in place  Rivers Duration (in days):   Central line:    SHERRY: 8/8/2024    Discharge is dependent on: Clinical progress, blood cultures pending  At this point Mr. Miranda is expected to be discharge to: Home    The 21st Century Cures Act makes medical notes like these available to patients in the interest of transparency. Please be advised this is a medical document. Medical documents are intended to carry relevant information, facts as evident, and the clinical opinion of the practitioner. The medical note is intended as peer to peer communication and may appear blunt or direct. It is written in medical language and may contain abbreviations or verbiage that are unfamiliar.

## 2024-08-08 NOTE — CONSULTS
Cardiology Consultation      Lopez Miranda Patient Status:  Inpatient    1955 MRN [id number]   Location [facility] 0SW-A Attending Elisabeth Elizabeth MD   Hosp Day # 1 PCP Maria Elena Serrano MD     Reason for Consultation:  Severe aortic valve stenosis    History of Present Illness:  Lopez Miranda is a(n) 69 year old male with chronic medical conditions including hypertension, family history of heart disease, atrial fibrillation who initially presented with fevers, chills general malaise.  On , he had played golf without any difficulty.  He states he golfs every  and has not noted chest pain or shortness of breath or lightheadedness.  States he shot a 45 and 41 and was on the course for probably 3 hours.  Next day he woke up with the fevers, generalized malaise.  He was diagnosed with pneumonia and admitted to the hospital.  He had an echocardiogram completed during his admission notable for hyperdynamic LV systolic function and suspected severe aortic valve stenosis.  Cardiology asked to evaluate.    History:  Past Medical History:    A-fib (HCC)    Arrhythmia    Coronary artery disease involving native coronary artery of native heart without angina pectoris    Coronary atherosclerosis    Essential hypertension    High blood pressure    High cholesterol    Hyperlipidemia     Past Surgical History:   Procedure Laterality Date    Colonoscopy      Other      right shoulder      History reviewed. No pertinent family history.   reports that he has never smoked. He has never used smokeless tobacco. He reports current alcohol use. He reports that he does not use drugs.    Allergies:  No Known Allergies    Medications:    Current Facility-Administered Medications:     acetaminophen (Tylenol Extra Strength) tab 500 mg, 500 mg, Oral, Q4H PRN    melatonin tab 3 mg, 3 mg, Oral, Nightly PRN    ondansetron (Zofran) 4 MG/2ML injection 4 mg, 4 mg, Intravenous, Q6H PRN    azithromycin (Zithromax) 500 mg in  sodium chloride 0.9% 250mL IVPB premix, 500 mg, Intravenous, Q24H    ampicillin-sulbactam (Unasyn) 3 g in sodium chloride 0.9% 100mL IVPB-ADD, 3 g, Intravenous, q6h    guaiFENesin (Robitussin) 100 MG/5 ML oral liquid 200 mg, 200 mg, Oral, Q4H PRN    guaiFENesin ER (Mucinex) 12 hr tab 600 mg, 600 mg, Oral, BID    apixaban (Eliquis) tab 5 mg, 5 mg, Oral, BID    aspirin chewable tab 162 mg, 162 mg, Oral, Daily    enalapril (Vasotec) tab 5 mg, 5 mg, Oral, Daily    methIMAzole (Tapazole) tab 15 mg, 15 mg, Oral, BID    pantoprazole (Protonix) DR tab 20 mg, 20 mg, Oral, QAM AC    rosuvastatin (Crestor) tab 10 mg, 10 mg, Oral, Nightly    Review of Systems:  A comprehensive review of systems was negative if not otherwise mention in above HPI.    /83 (BP Location: Right arm)   Pulse 81   Temp 98.4 °F (36.9 °C) (Oral)   Resp 16   Ht 6' (1.829 m)   Wt 200 lb (90.7 kg)   SpO2 99%   BMI 27.12 kg/m²   Temp (24hrs), Av.5 °F (36.9 °C), Min:98 °F (36.7 °C), Max:99.2 °F (37.3 °C)       Intake/Output Summary (Last 24 hours) at 2024 1630  Last data filed at 2024 0900  Gross per 24 hour   Intake 1040 ml   Output 1000 ml   Net 40 ml     Wt Readings from Last 3 Encounters:   24 200 lb (90.7 kg)   24 214 lb (97.1 kg)   24 219 lb (99.3 kg)       Physical Exam:   General: Alert and oriented x 3. No apparent distress. No respiratory or constitutional distress.  HEENT: Normocephalic, anicteric sclera, neck supple.  Neck: No JVD  Cardiac: Regular rate and rhythm. S1, S2 normal.  Grade 3 systolic murmur, left sternal border.  Lungs: Dim right mid field.  Clear otherwise.  Abdomen: Soft, non-tender. BS-present.  Extremities: Without clubbing, cyanosis or edema.   Neurologic: Alert and oriented, normal affect.  Skin: Warm and dry.     Laboratory Data:  Lab Results   Component Value Date    WBC 6.0 2024    HGB 11.4 2024    HCT 33.0 2024    .0 2024    CREATSERUM 0.80 2024     BUN 11 08/08/2024     08/08/2024    K 3.5 08/08/2024     08/08/2024    CO2 23.0 08/08/2024    GLU 96 08/08/2024    CA 8.7 08/08/2024       Imaging/results:  High-sensitivity troponin 20  CXR large consolidative pneumonia right upper lobe  EKG-NSR, nonspecific T wave abnormality      Assessment:  Right upper lobe pneumonia  Severe aortic valve stenosis  Paroxysmal atrial fibrillation, currently in sinus rhythm on Eliquis  Prior Hx cryo PVI RF CTI 1/24/2024  Hypertension  Hyperlipidemia  Hypothyroidism      Plan:  Pneumonia treatment per primary.  Now with noted progression to severe aortic valve stenosis.  Dimensionless index 0.25.  SVI index 38 ml/m².  Overall, patient is a very robust individual with above comorbid conditions and may benefit more from surgical aortic valve replacement.  Risks and benefits will need to be evaluated.  We will establish him in valve clinic for further evaluation and management.  Otherwise, he is compensated from a cardiac standpoint.  Continue Eliquis 5 mg twice daily, aspirin daily, enalapril 5 mg daily, atorvastatin 10 mg daily.        Thank you for allowing me to participate in the care of your patient.      Angel Mcbride DO  Cardiologist  [facility]  8/8/2024 4:30 PM      Note to the patient: The 21st Century Cures Act makes medical notes like these available to patients in the interest of transparency. However, be advised that this is a medical document. It is intended as peer to peer communication. It is written in medical language and may contain abbreviations or verbiage that are unfamiliar. It may appear blunt or direct. Medical documents are intended to carry relevant information, facts as evident, and clinical opinion of the practitioner.     Disclaimer: Components of this note were documented using voice recognition system and are subject to errors not corrected at proofreading. Contact the author of this note for any clarifications.

## 2024-08-09 ENCOUNTER — PATIENT OUTREACH (OUTPATIENT)
Dept: CASE MANAGEMENT | Age: 69
End: 2024-08-09

## 2024-08-09 ENCOUNTER — TELEPHONE (OUTPATIENT)
Dept: FAMILY MEDICINE CLINIC | Facility: CLINIC | Age: 69
End: 2024-08-09

## 2024-08-09 NOTE — DISCHARGE SUMMARY
[facility]  Discharge Summary    Lopez Miranda Patient Status:  Inpatient    1955 MRN [id number]   Location [facility] 0SW-A Attending No att. providers found   Hosp Day # 1 PCP Maria Elena Serrano MD     Date of Admission: 2024    Date of Discharge: 2024      Disposition: Home or Self Care    Discharge Diagnosis:   Right upper lobe pneumonia with fever and chills    Hypertension    Hyperlipidemia    Paroxysmal atrial fibrillation    CAD    Hyperthyroidism    GERD    Chronic heart murmur, severe nonrheumatic aortic valve stenosis    Hyponatremia due to pneumonia    Mild thrombocytopenia    Chief Complaint:   Chief Complaint   Patient presents with    Fever       History of Present Illness:   Lopez Miranda is a 69 year old male admitted with fever, chills, body aches.  Symptoms started on  night according to patient.  Fever with chills.  Denies any associated chest pain.  Denies any shortness of breath.  Denies any cold or cough symptoms.  Denies any nausea vomiting.  Denies any sick contact.  Denies any diarrhea constipation.  Denies any abdominal pain.  Patient states he has had a chronic heart murmur.  Has history of A-fib and CAD and hypertension and hyperlipidemia.     Brief Synopsis:   Right upper lobe pneumonia with fever and chills  Treated with IV antibiotics   blood culture sent on admission with no growth for 1 day   sputum culture with contaminant   urine Legionella and streptococcal antigen negative  MRSA screen by PCR negative  COVID screening negative  Given Tylenol as needed for fever  Robitussin as needed  Mucinex twice daily  Lactic acidosis normal at 1.8  Chest x-ray done on admission showed large area of consolidative pneumonia in the right upper lobe with slight depression of horizontal fissure  Hypertension  Continued home enalapril  Blood pressure controlled  Hyperlipidemia  Continued home statin  Paroxysmal atrial fibrillation  Currently normal sinus  rhythm  Continued home apixaban  CAD  Monitored on telemetry  Continued home aspirin and statin  Hyperthyroidism  Patient takes methimazole at home which was continued  GERD  Continued PPI  Chronic heart murmur, severe nonrheumatic aortic valve stenosis   2D echo with EF of 65 to 70%.  No regional wall motion abnormalities.severe aortic stenosis-worsening.no diagnostic evidence of endocarditis.  Cards consult  Hyponatremia due to pneumonia  Improving  Mild thrombocytopenia    Patient improved clinically.  Eager to go home today.  Repeat chest x-ray shows improving pneumonia.  Discharged home on oral antibiotics.  Follow-up with regular outpatient primary care physician Maria Elena Serrano MD   within 1 week in office.    **Certification    Admission date was 8/7/2024.  Inpatient stay was shorter than expected.  Patient's Community acquired pneumonia of right upper lobe of lung was initially serious enough to expect a more lengthy hospitalization but patient improved faster than expected.                 TCM Diagnosis at discharge from Hospital: Pneumonia    Lace+ Score: 48  59-90 High Risk  29-58 Medium Risk  0-28   Low Risk.     TCM Follow-Up Recommendation:Lopez CADE 29-58: Moderate Risk of readmission after discharge from the hospital.      PCP: Maria Elena Serrano MD    Consultations:   Consultants         Provider   Role Specialty     Angel Mcbride DO      Consulting Physician Cardiovascular Diseases       Procedures during hospitalization:   none    Incidental or significant findings and recommendations (brief descriptions):  none    Lab/Test results pending at Discharge:   none      Discharge Medications:        Discharge Medications        START taking these medications        Instructions Prescription details   amoxicillin clavulanate 875-125 MG Tabs  Commonly known as: Augmentin      Take 1 tablet by mouth 2 (two) times daily for 7 days.   Stop taking on: August 15, 2024  Quantity: 14 tablet  Refills:  0            CONTINUE taking these medications        Instructions Prescription details   apixaban 5 MG Tabs  Commonly known as: Eliquis      Take 1 tablet (5 mg total) by mouth 2 (two) times daily.   Refills: 0     aspirin 81 MG Chew      Chew 2 tablets (162 mg total) by mouth daily.   Refills: 0     enalapril 5 MG Tabs  Commonly known as: Vasotec      Take 1 tablet (5 mg total) by mouth daily.   Quantity: 30 tablet  Refills: 0     methIMAzole 10 MG Tabs  Commonly known as: Tapazole      TAKE 1.5 TABLETS (15 MG TOTAL) BY MOUTH IN THE MORNING AND 1.5 TABLETS (15 MG TOTAL) BEFORE BEDTIME.   Quantity: 270 tablet  Refills: 0     Multi-Vitamin Tabs      multivitamin tablet, [RxNorm: 0]   Refills: 0     omeprazole 20 MG Cpdr  Commonly known as: PriLOSEC      TAKE 1 CAPSULE BY MOUTH EVERY DAY   Quantity: 90 capsule  Refills: 0     rosuvastatin 10 MG Tabs  Commonly known as: Crestor      Take 1 tablet (10 mg total) by mouth nightly.   Refills: 0               Where to Get Your Medications        These medications were sent to [facility]/pharmacy #[id number] - [place], [place] - [address] [phone], [phone]  [address] [address]      Phone: [phone]   amoxicillin clavulanate 875-125 MG Tabs          [place] prescription monitoring program data reviewed in epic before prescribing narcotics/controlled substances: Not applicable as no narcotics prescribed    Follow up Visits: Follow-up with Maria Elena Serrano MD in 1 week    Maria Elena Serrano MD  [address] [address] [phone]    Schedule an appointment as soon as possible for a visit in 1 week(s)  Follow repeat chest x-ray after antibiotics with regular outpatient primary care physician to monitor resolution of the large right upper lobe pneumonia-if persisting consolidation, then may need referral to pulmonologist for further workup including biopsy if needed, please follow-up with regular outpatient primary care physician  regarding this    [address] [address]  [phone]  Follow up in 1 month(s)  office will call to schedule follow up in [facility], regarding Aortic Valve Stenosis.    Appointments for Next 30 Days 8/9/2024 - 9/8/2024      None            Physical Exam:  /74 (BP Location: Right arm)   Pulse 80   Temp 98.6 °F (37 °C) (Oral)   Resp 18   Ht 6' (1.829 m)   Wt 200 lb (90.7 kg)   SpO2 96%   BMI 27.12 kg/m²     General: No acute distress.   Respiratory: Clear to auscultation bilaterally.  No wheezes. No rhonchi.  Cardiovascular: S1, S2.  Regular rate and rhythm.  Systolic murmur in the aortic area. Equal pulses   Abdomen: Soft, nontender, nondistended.  Positive bowel sounds.  Neurologic: Awake alert, no focal neurological deficits.  Musculoskeletal: Full range of motion of all extremities.  No pedal edema or calf tenderness  Psychiatric: Appropriate mood and affect.    Discharge Condition: stable    Patient Discharge Instructions: See electronic chart      More than 30 minutes on discharge    Elisabeth Elizabeth MD

## 2024-08-09 NOTE — TELEPHONE ENCOUNTER
Patient's wife scheduled patient   Future Appointments   Date Time Provider Department Center   8/19/2024  2:00 PM Guerline Reeves APRN [facility] [place]   9/12/2024  4:15 PM Gloria Alexis APN [facility] [place]

## 2024-08-09 NOTE — BH RN DISCHARGE NOTE
NURSING DISCHARGE NOTE    Discharged via Ambulatory.  Accompanied by Family member  Belongings Taken by patient/family  Discharge instruction given.Verbalized understanding.  Due meds for tonight given.

## 2024-08-09 NOTE — PLAN OF CARE
[address] [address]  ?  08/08/24  ?  Re: Lopez Miranda  ?  To Whom It May Concern:    Lopez Miranda was admitted to [facility] from 08/7/24 to 08/08/24.    Please excuse Lopez Miranda from attending work for these days.    The patient may return to work on 08/09/24.    Patient will follow up with their primary care physician upon discharge.   Further restrictions and work excuse will be based on primary care physician's evaluation.  ?  Thank you,    Vida Mcbride, DO  [facility]

## 2024-08-12 NOTE — PROGRESS NOTES
[employer] message sent to the pt for TCM. NCM contact information was included in message.

## 2024-08-19 ENCOUNTER — OFFICE VISIT (OUTPATIENT)
Dept: FAMILY MEDICINE CLINIC | Facility: CLINIC | Age: 69
End: 2024-08-19
Payer: COMMERCIAL

## 2024-08-19 VITALS
WEIGHT: 216.5 LBS | BODY MASS INDEX: 29.32 KG/M2 | DIASTOLIC BLOOD PRESSURE: 80 MMHG | HEART RATE: 82 BPM | TEMPERATURE: 99 F | SYSTOLIC BLOOD PRESSURE: 130 MMHG | OXYGEN SATURATION: 97 % | HEIGHT: 72 IN | RESPIRATION RATE: 21 BRPM

## 2024-08-19 DIAGNOSIS — Z09 HOSPITAL DISCHARGE FOLLOW-UP: Primary | ICD-10-CM

## 2024-08-19 DIAGNOSIS — G89.29 CHRONIC LEFT SHOULDER PAIN: ICD-10-CM

## 2024-08-19 DIAGNOSIS — J18.9 PNEUMONIA OF RIGHT LOWER LOBE DUE TO INFECTIOUS ORGANISM: ICD-10-CM

## 2024-08-19 DIAGNOSIS — M25.512 CHRONIC LEFT SHOULDER PAIN: ICD-10-CM

## 2024-08-19 PROBLEM — I48.0 PAROXYSMAL ATRIAL FIBRILLATION (HCC): Status: ACTIVE | Noted: 2022-02-01

## 2024-08-19 PROBLEM — I65.23 ASYMPTOMATIC BILATERAL CAROTID ARTERY STENOSIS: Status: ACTIVE | Noted: 2020-02-06

## 2024-08-19 PROBLEM — I25.810 CORONARY ARTERY DISEASE INVOLVING CORONARY BYPASS GRAFT OF NATIVE HEART WITHOUT ANGINA PECTORIS: Status: ACTIVE | Noted: 2020-02-06

## 2024-08-19 PROBLEM — R07.2 PRECORDIAL PAIN: Status: ACTIVE | Noted: 2019-10-24

## 2024-08-19 PROBLEM — R06.09 DOE (DYSPNEA ON EXERTION): Status: ACTIVE | Noted: 2022-02-01

## 2024-08-19 RX ORDER — PREDNISONE 20 MG/1
20 TABLET ORAL 2 TIMES DAILY
Qty: 14 TABLET | Refills: 0 | Status: SHIPPED | OUTPATIENT
Start: 2024-08-19 | End: 2024-08-26

## 2024-08-19 NOTE — PROGRESS NOTES
Subjective:   Lopez Miranda is a 69 year old male who presents for hospital follow up.   He was discharged from [facility] to Home or Self Care  Admission Date: 8/7/24   Discharge Date: 8/8/24  Hospital Discharge Diagnosis: Right upper lobe pneumonia with fever and chills     Interactive contact within 2 business days post discharge first initiated on Date: 8/9/2024    During the visit, the following was completed:  Obtained and reviewed discharge summary, continuity of care documents, and Hospitalist notes  Reviewed Labs (CBC, CMP), CT radiology results, and X-Ray radiology results    HPI: Patient was seen in Emergency Room on 8/7 due to fatigue, fever and chills.  He was diagnosed with pneumonia and given a 7 days course of amoxicillin.  He reports he is feeling well.  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  Reports some fatigue.      Patient has complaint of left shoulder pain.  This is a chronic issues he has had for years.  In the past he has seen ortho and received a cortisone injection with relief of symptoms.  He reports he is not able to see ortho till October and wonders if something can be done in the meantime for pain.       History/Other:   Current Medications:  Medication Reconciliation:  I am aware of an inpatient discharge within the last 30 days.  The discharge medication list has been reconciled with the patient's current medication list and reviewed by me.  See medication list for additions of new medication, and changes to current doses of medications and discontinued medications.  Outpatient Medications Marked as Taking for the 8/19/24 encounter (Office Visit) with Guerline Reeves APRN   Medication Sig    predniSONE 20 MG Oral Tab Take 1 tablet (20 mg total) by mouth 2 (two) times daily for 7 days.    Multiple Vitamin (MULTI-VITAMIN) Oral Tab multivitamin tablet, [RxNorm: 0]    METHIMAZOLE 10 MG Oral Tab TAKE 1.5 TABLETS (15 MG TOTAL) BY MOUTH IN THE MORNING AND 1.5  TABLETS (15 MG TOTAL) BEFORE BEDTIME.    apixaban 5 MG Oral Tab Take 1 tablet (5 mg total) by mouth 2 (two) times daily.    enalapril 5 MG Oral Tab Take 1 tablet (5 mg total) by mouth daily.    OMEPRAZOLE 20 MG Oral Capsule Delayed Release TAKE 1 CAPSULE BY MOUTH EVERY DAY    Rosuvastatin Calcium 10 MG Oral Tab Take 1 tablet (10 mg total) by mouth nightly.    aspirin 81 MG Oral Chew Tab Chew 2 tablets (162 mg total) by mouth daily.       Review of Systems  GENERAL: feels well otherwise  SKIN: denies any unusual skin lesions  EYES: denies blurred vision or double vision  HEENT: denies nasal congestion, sinus pain or ST  LUNGS: denies shortness of breath with exertion  CARDIOVASCULAR: denies chest pain on exertion  GI: denies abdominal pain, denies heartburn  : 0 per night nocturia, no complaint of urinary incontinence  MUSCULOSKELETAL: denies back pain  NEURO: denies headaches  PSYCHE: denies depression or anxiety  HEMATOLOGIC: denies hx of anemia  ENDOCRINE: denies thyroid history  ALL/ASTHMA: denies hx of allergy or asthma    Objective:   Physical Exam  Constitutional:       Appearance: Normal appearance.   HENT:      Head: Normocephalic.   Eyes:      Pupils: Pupils are equal, round, and reactive to light.   Cardiovascular:      Rate and Rhythm: Normal rate and regular rhythm.      Pulses: Normal pulses.      Heart sounds: Murmur heard.   Pulmonary:      Effort: Pulmonary effort is normal.      Breath sounds: Normal breath sounds.   Musculoskeletal:         General: Normal range of motion.      Cervical back: Normal range of motion.   Skin:     General: Skin is dry.      Capillary Refill: Capillary refill takes less than 2 seconds.   Neurological:      Mental Status: He is alert and oriented to person, place, and time.   Psychiatric:         Mood and Affect: Mood normal.         Behavior: Behavior normal.         /80   Pulse 82   Temp 98.8 °F (37.1 °C) (Temporal)   Resp 21   Ht 6' (1.829 m)   Wt 216 lb  8 oz (98.2 kg)   SpO2 97%   BMI 29.36 kg/m²  Estimated body mass index is 29.36 kg/m² as calculated from the following:    Height as of this encounter: 6' (1.829 m).    Weight as of this encounter: 216 lb 8 oz (98.2 kg).    Assessment & Plan:   1. Hospital discharge follow-up (Primary)  2. Pneumonia of right lower lobe due to infectious organism  3. Chronic left shoulder pain  -     Cancel: Refer to Orthopedics  -     predniSONE; Take 1 tablet (20 mg total) by mouth 2 (two) times daily for 7 days.  Dispense: 14 tablet; Refill: 0  -     Refer to Orthopedics        FARIBA Rich

## 2024-08-19 NOTE — PROGRESS NOTES
Multiple attempts to reach patient.  Patient went in for hospital follow-up appointment with primary care provider office on 8/19/24.  Encounter closing.

## 2024-08-21 ENCOUNTER — TELEPHONE (OUTPATIENT)
Dept: FAMILY MEDICINE CLINIC | Facility: CLINIC | Age: 69
End: 2024-08-21

## 2024-08-21 NOTE — TELEPHONE ENCOUNTER
Patient was recently hospitalized for pneumonia. Echo was done and it showed severe aortic stenosis. He follows with [facility]. Can you send a copy of the echo to Dr. Lara and make sure they receive this? Thank you!

## 2024-08-24 DIAGNOSIS — T46.2X5A HYPERTHYROIDISM DUE TO AMIODARONE: ICD-10-CM

## 2024-08-24 DIAGNOSIS — E05.80 HYPERTHYROIDISM DUE TO AMIODARONE: ICD-10-CM

## 2024-08-26 RX ORDER — METHIMAZOLE 10 MG/1
15 TABLET ORAL 2 TIMES DAILY
Qty: 270 TABLET | Refills: 0 | Status: SHIPPED | OUTPATIENT
Start: 2024-08-26

## 2024-08-26 NOTE — TELEPHONE ENCOUNTER
Endocrine refill protocol for medications for hypothyroidism and hyperthyroidism    Protocol Criteria:  FAILED Reason: Abnormal labs  Appointment with Endocrinology completed in the last 12 months or scheduled in the next 6 months     Verify appointment has been completed or scheduled in the appropriate timeline. If so can send a 90 day supply with 1 refill per provider protocol.    Normal TSH result in the past 12 months   Review recent telephone encounters and mychart communications with patient to ensure a dose change has not occurred since last office visit that was not updated in the medication history list   Last completed office visit:4/25/2024 Gloria Alexis APN   Next scheduled Follow up:   Future Appointments   Date Time Provider Department Center   9/12/2024  4:15 PM Gloria Alexis APN [facility] [place]      Last TSH result:   TSH   Date Value Ref Range Status   07/27/2024 0.014 (L) 0.550 - 4.780 mIU/mL Final   11/21/2020 <0.005 uIU/mL Final       Refill protocol failed. MCM sent to pt with reminder to have labs repeated prior to upcoming appt.     Routing on to provider for review.

## 2024-09-07 ENCOUNTER — LAB ENCOUNTER (OUTPATIENT)
Dept: LAB | Age: 69
End: 2024-09-07
Payer: COMMERCIAL

## 2024-09-07 DIAGNOSIS — E05.80 HYPERTHYROIDISM DUE TO AMIODARONE: ICD-10-CM

## 2024-09-07 DIAGNOSIS — T46.2X5A HYPERTHYROIDISM DUE TO AMIODARONE: ICD-10-CM

## 2024-09-07 LAB
T3 SERPL-MCNC: 1.26 NG/ML (ref 0.6–1.81)
T4 FREE SERPL-MCNC: 1.2 NG/DL (ref 0.8–1.7)
TSI SER-ACNC: <0.008 MIU/ML (ref 0.55–4.78)

## 2024-09-07 PROCEDURE — 84443 ASSAY THYROID STIM HORMONE: CPT

## 2024-09-07 PROCEDURE — 84480 ASSAY TRIIODOTHYRONINE (T3): CPT

## 2024-09-07 PROCEDURE — 84439 ASSAY OF FREE THYROXINE: CPT

## 2024-09-12 ENCOUNTER — TELEMEDICINE (OUTPATIENT)
Facility: CLINIC | Age: 69
End: 2024-09-12
Payer: COMMERCIAL

## 2024-09-12 DIAGNOSIS — E05.00 GRAVES DISEASE: ICD-10-CM

## 2024-09-12 DIAGNOSIS — E05.80 HYPERTHYROIDISM DUE TO AMIODARONE: Primary | ICD-10-CM

## 2024-09-12 DIAGNOSIS — T46.2X5A HYPERTHYROIDISM DUE TO AMIODARONE: Primary | ICD-10-CM

## 2024-09-12 PROCEDURE — 99442 PHONE E/M BY PHYS 11-20 MIN: CPT

## 2024-09-12 NOTE — PATIENT INSTRUCTIONS
Follow up with BRUCE Mcintosh: Return in about 4 months (around 1/12/2025) for thyroid follow up, can be video visit.    Medication:  - for now, continue: Thryoid Meds: methimazole 30mg daily (takes 1.5 tabs of 10mg in AM and 1.5 tabs of 10mg in PM)    - labs after 10/20, already ordered. Notify me sooner if you have symptoms of LOW thyroid, which is fatigue, constipation, feeling cold, weight gain     Understanding thyroid labs:  - TSH = thyroid stimulating hormone, this is the 'signal' which turns UP or DOWN depending on how much hormone your thyroid gland is producing.    - free T4, total T3 = the thyroid hormones in your blood, this is the thyroid number you can \"Feel\"- if T4 is low, that indicates hypothyroidism, if T4 is high, that indicates hyperthyroidism    [facility] - BRUCE Mcintosh  Office phone number: [phone]  Fax number: [phone]

## 2024-09-12 NOTE — PROGRESS NOTES
[facility] Note - Telemedicine    Lopez Miranda verbally consents to a Telephone (Audio only) visit on 9/12/2024. The visit was conducted in a private patient room.       HISTORY OF PRESENT ILLNESS   Lopez Miranda is a 69 year old male with PMHx significant for atrial fibrillation dx'd in July 2022, HTN, CAD, Graves disease who presents for consultation for thyrotoxicosis    Initial HPI consult in July 2023  Thyroid hx:  (-) Fhx of thyroid disease   Thyroid dysfunction dates back to Nov 2020, TSH: <0.005, fT4: 3.07  Works as a  and farmer, has felt \"absolutely great\" over the last few years and self-describes as generally healthy which is why he delayed seeking care for abnormal thyroid tests  Dx'd with afib 2 years ago on a work physical, s/p 2 attempts cardioversion, on amiodarone. Follows with cardiology  No known fam hx of autoimmunity    11/2020, TSH: <0.005, fT4: 3.07  7/2023: TSH - <0.005, fT4 - 2.40    Pt endorses: amiodarone use and biotin use (taking Centrum MVI w/ 100% biotin daily)    Pt denies: fatigue/weakness, temperature intolerance, tremor, palpitations, vision changes, hair loss, hyperdefectation, dysphagia, and unexplained weight loss recent illness, weight loss medication use, and recent IV contrast for imaging      Interim hx:  Oct 2023- w/ endo APN  10/2023- TSH <0.005, fT4  2.90, total T3 189  Feeling generally well, noting some joint pain but has improved, some fatigue  On MMI 30mg daily, taking 10mg TID and hoping to change to BID dosing  Follows with Dr. Lara with [facility]- he has not discussed his hyperTH diagnosis with cards yet    Dec 2023- w/ endo APN  12/2023 labs: TSH <0.005, fT4 1.8, total T3 144, taking MMI 30mg daily (splits dose into two, 15mg doses)  Feeling well, he has never felt symptomatic of his hyperTH  Has upcoming ablation scheduled w/ cardiology, he is not sure about proceeding/asking about thyroid involvement with the  afib    February 2024- w/ endo BRUCE, re: thyroid:  Had ablation with cardiology- 1/12/24- decr'd amiodarone from 200mg --> 100mg daily, plan is to meet again in April 2024 to possibly discontinue dose altogether  Feeling well, sleeping well. Occasionally more tired  Currently taking methimazole 15mg BID  2/2024 labs: TSH <0.005, fT4 1.7, total T3 129     April 2024- w/ endo APN, re: thyroid: Feeling well on MMI 30mg daily. Still taking amiodarone 100mg daily, seeing cardiology Dr. Lara this afternoon and is anticipating another dose wean. Still notes ongoing fatigue (falling asleep early) especially on days he works. Denies other sx's of hypothyroidism, including constipation, hair loss.   3/2/2024- TSH <0.005, fT4 1.6, TT3 117,   3/30/24- TSH <0.005, fT4 1.30, TT3 1.41 - taking MMI 30mg daily    9/12/2024- w/ endo BRUCE Duvall, re: thyroid. Phone call, video visit wouldn't work. Amiodarone was weaned as of late April 2024- he has tolerated it well.  He was hospitalized for CAP in Aug 2024. Feeling much better now, back to working on the farm. Denies palpitations, diarrhea. Has good energy-- working all day 5a-6p as it's sweet corn season.   Thryoid Meds: methimazole 30mg daily (takes 1.5 tabs of 10mg in AM and 1.5 tabs of 10mg in PM)    Thyroid labs:  Recent Labs     06/15/24  0858 07/27/24  0714 09/07/24  0702   T4F 1.0 1.4 1.2   TSH <0.005* 0.014* <0.008*          Objective:    REVIEW OF SYSTEMS  Ten point review of systems has been performed and is otherwise negative and/or non-contributory, except as described above.    Medications:     Current Outpatient Medications:     METHIMAZOLE 10 MG Oral Tab, TAKE 1.5 TABLETS (15 MG TOTAL) BY MOUTH IN THE MORNING AND 1.5 TABLETS (15 MG TOTAL) BEFORE BEDTIME., Disp: 270 tablet, Rfl: 0    Multiple Vitamin (MULTI-VITAMIN) Oral Tab, multivitamin tablet, [RxNorm: 0], Disp: , Rfl:     apixaban 5 MG Oral Tab, Take 1 tablet (5 mg total) by mouth 2 (two) times daily., Disp: , Rfl:      enalapril 5 MG Oral Tab, Take 1 tablet (5 mg total) by mouth daily., Disp: 30 tablet, Rfl: 0    OMEPRAZOLE 20 MG Oral Capsule Delayed Release, TAKE 1 CAPSULE BY MOUTH EVERY DAY, Disp: 90 capsule, Rfl: 0    Rosuvastatin Calcium 10 MG Oral Tab, Take 1 tablet (10 mg total) by mouth nightly., Disp: , Rfl:     aspirin 81 MG Oral Chew Tab, Chew 2 tablets (162 mg total) by mouth daily., Disp: , Rfl:      Allergies:   No Known Allergies    Social History:   Social History     Socioeconomic History    Marital status: Single   Tobacco Use    Smoking status: Never    Smokeless tobacco: Never   Vaping Use    Vaping status: Never Used   Substance and Sexual Activity    Alcohol use: Yes     Comment: social    Drug use: Never   Other Topics Concern    Caffeine Concern Yes     Comment: 2 cups of coffee every morning and sometimes pop during the day.     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 Speaking in full sentences   Neurological:  Alert and oriented to person, place and time.   Psychiatric: Normal affect, mood and behavior appropriate      Labs:  11/2020: TSH: <0.005, fT4: 3.07  7/2023: TSH - <0.005, fT4 - 2.40  8/2023: TSH <0.005, fT4: 3.60, total T3 187, TSI 3.96  9/2023: TSH <0.005, fT4: 2.90, total T3 187  12/2023: TSH <0.005, fT4 1.8, total T3 144  2/2024: TSH <0.005, fT4 1.7, total T3 129   3/2/2024- TSH <0.005, fT4 1.6, TT3 117,   3/30/24- TSH  <0.005, fT4 1.30, TT3 1.41    Imaging:  Sept 2023 ultrasound:   Narrative   PROCEDURE:  US THYROID (CPT=76536)     COMPARISON:  None.     INDICATIONS:  E05.90 Thyrotoxicosis without thyroid storm, unspecified thyrotoxicosis type     TECHNIQUE:  High-resolution ultrasound of the thyroid gland was performed.     PATIENT STATED HISTORY: (As transcribed by Technologist)  Thyrotoxicosis     FINDINGS:       MEASUREMENTS:  RIGHT LOBE:  6.5 x 3.9 x 3.8 cm  LEFT LOBE:  6.4 x 3.5 x 2.6 cm  ISTHMUS:  1.1 cm     NODULES:  None.     OTHER:  None.      Impression   CONCLUSION:  TR1 - Benign (No FNA).  Overall enlarged thyroid gland.         Latest Reference Range & Units 09/30/23 07:43 12/16/23 07:11 03/01/24 15:08 03/30/24 07:21 07/27/24 07:14   Thy Stim Immuno 0.00 - 0.55 IU/L 4.75 (H) 2.70 (H) 1.92 (H) 1.86 (H) 1.66 (H)   (H): Data is abnormally high      Assessment & Plan:    (E05.00) Graves disease  (primary encounter diagnosis)  (E05.90) Thyrotoxicosis without thyroid storm, unspecified thyrotoxicosis type  (primary encounter diagnosis)  Plan:   Biochemically thyrotoxic dating back to 7/2020; dx'd with a-fib mid-2021 per patient. Patient presented to endo clinic in July 2023, and initiated MMI treatment. (+)TSI antibodies c/w Graves.   Was taking amiodarone 200mg daily, which was contributing to need for high-dose MMI to achieve euthyroidism. He completed an ablation to treat his afib in Jan 2024 this year with cardiologist, Dr. Lara. Stopped amiodarone in April 2024.     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 Previously attempted prednisone burst/taper to eval if more rapid TFT improvement, which would be more consistent with type 2 AIT,  however pt did not complete labs within time frame requested.  Thus, difficult to ascertain if improvement was attributable to steroids vs. MMI. TFTs have trended nicely downward on MMI; TSI trended from Dec 2023 2.7 --> March 2024  1.92 --> July 2024 1.61.  Will await lower fT4 then wean his methimazole.  If he were to develop hypothyroidism symptoms sooner, to contact clinic so we can repeat labs promptly, he states understanding.    - continue MMI 30mg daily (he is taking in split doses to reduce GI upset- 15mg (1.5 tab) qAM, 15mg (1.5 tab) qPM)  - TFTs in 6 wks- after 10/20, contact clinic sooner if sx's of hypothyroidism and we will do sooner  - Long term plan with Graves is to wean MMI as tolerated over the course of 12-18 months while maintaining euthyroid state  - counseled on potential SE of MMI  - no opthalmopathy on exam, ref'd to optho for baseline testing given longstanding untreated Graves    Return in about 4 months (around 1/12/2025) for thyroid follow up, can be video visit.    A total of 11 minutes was spent today on obtaining history, reviewing pertinent labs, evaluating patient, providing multiple treatment options, reinforcing diet/exercise and compliance, and completing documentation.     9/12/2024  BRUCE Mcintosh

## 2024-10-01 ENCOUNTER — TELEPHONE (OUTPATIENT)
Dept: FAMILY MEDICINE CLINIC | Facility: CLINIC | Age: 69
End: 2024-10-01

## 2024-10-01 NOTE — TELEPHONE ENCOUNTER
Patient has an appointment with Dr Behl @ [facility] in [place] next Thursday   Patient needs the report for the MRI on 6/27/24, CT Brain and Head on 8/7/24 and XR Cervical Spine on 6/4/24  Patient will call the Hospital  to get the Disks.     Fax#  [phone]  . [phone]

## 2024-10-11 ENCOUNTER — TELEPHONE (OUTPATIENT)
Dept: FAMILY MEDICINE CLINIC | Facility: CLINIC | Age: 69
End: 2024-10-11

## 2024-10-11 DIAGNOSIS — M25.512 CHRONIC LEFT SHOULDER PAIN: Primary | ICD-10-CM

## 2024-10-11 DIAGNOSIS — G89.29 CHRONIC LEFT SHOULDER PAIN: Primary | ICD-10-CM

## 2024-10-11 NOTE — TELEPHONE ENCOUNTER
Patient called and is wondering if he can get recommendations for physical therapy. Per patient, he doesn't want to keep taking pain medication and does not want to see a pain specialist. Patient states he wants to try physical therapy instead. Please advise

## 2024-10-11 NOTE — TELEPHONE ENCOUNTER
Last office visit with Guerline on 8/19/24    Patient has complaint of left shoulder pain. This is a chronic issues he has had for years. In the past he has seen ortho and received a cortisone injection with relief of symptoms. He reports he is not able to see ortho till October and wonders if something can be done in the meantime for pain.

## 2024-10-18 NOTE — PAT NURSING NOTE
PAT call with patient to set him up for his Preop visit prior to his procedure scheduled with Dr. Diaz 1/6/25. The following was sent to his My Chart.    You are scheduled for a Preop-visit 12/30/24 at 1 pm. Please check in at the registrations desk on the ground floor of the [facility]. If you have any questions, call us at [phone]. We are her M-F 8 am - 5 pm and we do have voicemail. We look forward to seeing you on December 30 th.

## 2024-10-23 ENCOUNTER — TELEPHONE (OUTPATIENT)
Dept: FAMILY MEDICINE CLINIC | Facility: CLINIC | Age: 69
End: 2024-10-23

## 2024-10-23 NOTE — TELEPHONE ENCOUNTER
Patient has seen ortho - Dr. Behl - for neck pain.    Spoke with patient - neck numbness is improving but wanted to check on what to do.    Suggested contacted Dr. Behl regarding next discomfort - had seen him on 10/10/2024 for cervicalgia

## 2024-10-23 NOTE — TELEPHONE ENCOUNTER
Patient put Biofreeze on his shoulder and left side of his neck.  States the neck has become numb, and has lasted for the last 3 days.  States he is not sure if this is a side effect from the Biofreeze or if its something else. States some of the feeling has come back, but there is still an area on his neck that is still numb.    Sending to Triage

## 2024-10-26 ENCOUNTER — LAB ENCOUNTER (OUTPATIENT)
Dept: LAB | Age: 69
End: 2024-10-26
Payer: COMMERCIAL

## 2024-10-26 ENCOUNTER — TELEPHONE (OUTPATIENT)
Dept: FAMILY MEDICINE CLINIC | Facility: CLINIC | Age: 69
End: 2024-10-26

## 2024-10-26 DIAGNOSIS — T46.2X5A HYPERTHYROIDISM DUE TO AMIODARONE: ICD-10-CM

## 2024-10-26 DIAGNOSIS — E05.80 HYPERTHYROIDISM DUE TO AMIODARONE: ICD-10-CM

## 2024-10-26 LAB
T3 SERPL-MCNC: 1.14 NG/ML (ref 0.6–1.81)
T4 FREE SERPL-MCNC: 0.9 NG/DL (ref 0.8–1.7)
TSI SER-ACNC: 0.03 MIU/ML (ref 0.55–4.78)

## 2024-10-26 PROCEDURE — 84443 ASSAY THYROID STIM HORMONE: CPT

## 2024-10-26 PROCEDURE — 84439 ASSAY OF FREE THYROXINE: CPT

## 2024-10-26 PROCEDURE — 84480 ASSAY TRIIODOTHYRONINE (T3): CPT

## 2024-10-26 NOTE — TELEPHONE ENCOUNTER
Received a medical record release from [facility] chiropract[facility] clinic.   Sent to [employer]   Copy sent to scanning

## 2024-10-31 ENCOUNTER — TELEPHONE (OUTPATIENT)
Facility: CLINIC | Age: 69
End: 2024-10-31

## 2024-10-31 DIAGNOSIS — E05.00 GRAVES DISEASE: Primary | ICD-10-CM

## 2024-10-31 DIAGNOSIS — T46.2X5A HYPERTHYROIDISM DUE TO AMIODARONE: ICD-10-CM

## 2024-10-31 DIAGNOSIS — E05.80 HYPERTHYROIDISM DUE TO AMIODARONE: ICD-10-CM

## 2024-10-31 NOTE — TELEPHONE ENCOUNTER
Lab result note: Let's reduce his dose-- currently taking:  - MMI 30mg daily (he is taking in split doses to reduce GI upset- 15mg (1.5 tab) qAM, 15mg (1.5 tab) qPM)     - He can change his dose to 1 tab (10mg) in the AM and 1.5 (15mg) tab in the PM (total of 25mg) and repeat labs in 6 weeks    Patient phoned in to discuss lab results, call disconnected, RN phoned back    Spoke with patient and reviewed above changes to verbalized understanding.    Repeat labs ordered per written order.     Closing Encounter.

## 2024-11-05 ENCOUNTER — APPOINTMENT (OUTPATIENT)
Dept: PHYSICAL THERAPY | Age: 69
End: 2024-11-05
Attending: NURSE PRACTITIONER
Payer: COMMERCIAL

## 2024-11-11 ENCOUNTER — APPOINTMENT (OUTPATIENT)
Dept: PHYSICAL THERAPY | Age: 69
End: 2024-11-11
Attending: NURSE PRACTITIONER
Payer: COMMERCIAL

## 2024-11-13 ENCOUNTER — APPOINTMENT (OUTPATIENT)
Dept: PHYSICAL THERAPY | Age: 69
End: 2024-11-13
Attending: NURSE PRACTITIONER
Payer: COMMERCIAL

## 2024-11-19 ENCOUNTER — APPOINTMENT (OUTPATIENT)
Dept: PHYSICAL THERAPY | Age: 69
End: 2024-11-19
Attending: NURSE PRACTITIONER
Payer: COMMERCIAL

## 2024-11-22 ENCOUNTER — APPOINTMENT (OUTPATIENT)
Dept: PHYSICAL THERAPY | Age: 69
End: 2024-11-22
Attending: NURSE PRACTITIONER
Payer: COMMERCIAL

## 2024-11-23 DIAGNOSIS — E05.80 HYPERTHYROIDISM DUE TO AMIODARONE: ICD-10-CM

## 2024-11-23 DIAGNOSIS — T46.2X5A HYPERTHYROIDISM DUE TO AMIODARONE: ICD-10-CM

## 2024-11-25 RX ORDER — METHIMAZOLE 10 MG/1
TABLET ORAL
Qty: 225 TABLET | Refills: 0 | Status: SHIPPED | OUTPATIENT
Start: 2024-11-25

## 2024-11-25 NOTE — PAT NURSING NOTE
PreOp Instructions     You are scheduled for: a Cardiac Procedure     Date of Procedure: 12/02/24     Diet Instructions: Do not eat or drink anything after midnight including gum, mints, candy, etc the night before your procedure.     Medications: Take Aspirin 81 mg x 4 tablets the morning of your procedure, Medications you are allowed to take can be taken with a sip of water the morning of your procedure. You can also take Enalapril, Methimazole, and Omeprazole.     Do not take the following Blood Thinner(s): DO NOT TAKE Eliquis  for 2 days prior to your procedure. Your last dose will be on Friday 11/29 in the evening.     Medications to Stop: DO NOT TAKE any herbal supplements and vitamins the morning of your procedure.     Skin Prep : Shower with antibacterial soap using a clean washcloth, prior to procedure. Once dried off, no lotions/powders/creams/ointments, etc.     Arrival Time: The Friday prior to your procedure you will receive a phone call between 3:00 pm- 6:00 pm with your arrival time. If you haven't received a phone call, please check your voicemail messages., If you did not receive a voice mail and it is after 6:00 pm, please call the nursing supervisor at [phone].    Driving After Procedure: Sedation will be given so you WILL NOT be able to drive home. You will need a responsible adult  to drive you home. You can NOT take uber or taxi unless approved by your physician in advance.     Discharge Teaching: Your nurse will give you specific instructions before discharge, Most people can resume normal activities in 2-3 days, Any questions, please call the physician's office

## 2024-11-25 NOTE — TELEPHONE ENCOUNTER
Endocrine refill protocol for medications for hypothyroidism and hyperthyroidism    Protocol Criteria:  FAILED Reason: Abnormal labs    If all below requirements are met, send a 90-day supply with 1 refill per provider protocol.    Verify appointment with Endocrinology completed in the last 12 months or scheduled in the next 6 months.    Normal TSH result in the past 12 months   Review recent telephone encounters and mychart communications with patient to ensure a dose change has not occurred since last office visit that was not updated in the medication history list     Last completed office visit:9/12/2024 Gloria Alexis APN   Next scheduled Follow up:   Future Appointments   Date Time Provider Department Center   12/2/2024  6:00 AM [facility]H IVS RM 6 [facility]   12/30/2024  9:00 AM PAT RN EH ANUJ CTR [facility]   12/30/2024  9:00 AM EH CARD HOLTER RM 1 EH CARD HL [facility]   12/30/2024  9:20 AM EH CARD PHLEBOTOMY RM1 EH CARD LA [facility]   12/30/2024  9:40 AM EH PAT RN EH IVS [facility]   12/30/2024  9:50 AM EH XR IP PORTABLE EH XRAY [facility]   1/10/2025  1:15 PM Gloria Alexis APN [facility]      Last TSH result:   TSH   Date Value Ref Range Status   10/26/2024 0.028 (L) 0.550 - 4.780 mIU/mL Final   11/21/2020 <0.005 uIU/mL Final     *Per labs dated 10/26/24 Methimazole decreased from 30mg daily --> 25mg daily:  10mg in the AM, 15mg in the PM.     Phoned patient, and he is in need of refill. Reminded to do labs- states will do.    3 month refill pended to Eloina BARRERA.

## 2024-11-27 ENCOUNTER — LAB ENCOUNTER (OUTPATIENT)
Dept: LAB | Age: 69
End: 2024-11-27
Attending: INTERNAL MEDICINE
Payer: COMMERCIAL

## 2024-11-27 DIAGNOSIS — E05.00 GRAVES DISEASE: ICD-10-CM

## 2024-11-27 DIAGNOSIS — E05.80 HYPERTHYROIDISM DUE TO AMIODARONE: ICD-10-CM

## 2024-11-27 DIAGNOSIS — T46.2X5A HYPERTHYROIDISM DUE TO AMIODARONE: ICD-10-CM

## 2024-11-27 DIAGNOSIS — Z01.818 PRE-OP TESTING: ICD-10-CM

## 2024-11-27 LAB
ANION GAP SERPL CALC-SCNC: 8 MMOL/L (ref 0–18)
BASOPHILS # BLD AUTO: 0.05 X10(3) UL (ref 0–0.2)
BASOPHILS NFR BLD AUTO: 0.8 %
BUN BLD-MCNC: 13 MG/DL (ref 9–23)
CALCIUM BLD-MCNC: 9.9 MG/DL (ref 8.7–10.4)
CHLORIDE SERPL-SCNC: 107 MMOL/L (ref 98–112)
CO2 SERPL-SCNC: 26 MMOL/L (ref 21–32)
CREAT BLD-MCNC: 0.88 MG/DL
EGFRCR SERPLBLD CKD-EPI 2021: 94 ML/MIN/1.73M2 (ref 60–?)
EOSINOPHIL # BLD AUTO: 0.15 X10(3) UL (ref 0–0.7)
EOSINOPHIL NFR BLD AUTO: 2.4 %
ERYTHROCYTE [DISTWIDTH] IN BLOOD BY AUTOMATED COUNT: 12.4 %
FASTING STATUS PATIENT QL REPORTED: YES
GLUCOSE BLD-MCNC: 88 MG/DL (ref 70–99)
HCT VFR BLD AUTO: 38 %
HGB BLD-MCNC: 12.7 G/DL
IMM GRANULOCYTES # BLD AUTO: 0.02 X10(3) UL (ref 0–1)
IMM GRANULOCYTES NFR BLD: 0.3 %
LYMPHOCYTES # BLD AUTO: 1.74 X10(3) UL (ref 1–4)
LYMPHOCYTES NFR BLD AUTO: 27.9 %
MCH RBC QN AUTO: 29.4 PG (ref 26–34)
MCHC RBC AUTO-ENTMCNC: 33.4 G/DL (ref 31–37)
MCV RBC AUTO: 88 FL
MONOCYTES # BLD AUTO: 0.45 X10(3) UL (ref 0.1–1)
MONOCYTES NFR BLD AUTO: 7.2 %
NEUTROPHILS # BLD AUTO: 3.83 X10 (3) UL (ref 1.5–7.7)
NEUTROPHILS # BLD AUTO: 3.83 X10(3) UL (ref 1.5–7.7)
NEUTROPHILS NFR BLD AUTO: 61.4 %
OSMOLALITY SERPL CALC.SUM OF ELEC: 292 MOSM/KG (ref 275–295)
PLATELET # BLD AUTO: 165 10(3)UL (ref 150–450)
POTASSIUM SERPL-SCNC: 4 MMOL/L (ref 3.5–5.1)
RBC # BLD AUTO: 4.32 X10(6)UL
SODIUM SERPL-SCNC: 141 MMOL/L (ref 136–145)
T3 SERPL-MCNC: 1.11 NG/ML (ref 0.6–1.81)
T4 FREE SERPL-MCNC: 1 NG/DL (ref 0.8–1.7)
TSI SER-ACNC: 0.1 UIU/ML (ref 0.55–4.78)
WBC # BLD AUTO: 6.2 X10(3) UL (ref 4–11)

## 2024-11-27 PROCEDURE — 85025 COMPLETE CBC W/AUTO DIFF WBC: CPT

## 2024-11-27 PROCEDURE — 84480 ASSAY TRIIODOTHYRONINE (T3): CPT

## 2024-11-27 PROCEDURE — 80048 BASIC METABOLIC PNL TOTAL CA: CPT

## 2024-11-27 PROCEDURE — 84439 ASSAY OF FREE THYROXINE: CPT

## 2024-11-27 PROCEDURE — 84443 ASSAY THYROID STIM HORMONE: CPT

## 2024-11-27 PROCEDURE — 36415 COLL VENOUS BLD VENIPUNCTURE: CPT

## 2024-11-29 DIAGNOSIS — E05.80 HYPERTHYROIDISM DUE TO AMIODARONE: Primary | ICD-10-CM

## 2024-11-29 DIAGNOSIS — E05.00 GRAVES DISEASE: ICD-10-CM

## 2024-11-29 DIAGNOSIS — T46.2X5A HYPERTHYROIDISM DUE TO AMIODARONE: Primary | ICD-10-CM

## 2024-12-02 ENCOUNTER — HOSPITAL ENCOUNTER (OUTPATIENT)
Dept: INTERVENTIONAL RADIOLOGY/VASCULAR | Facility: HOSPITAL | Age: 69
Discharge: HOME | End: 2024-12-02
Attending: INTERNAL MEDICINE | Admitting: INTERNAL MEDICINE
Payer: COMMERCIAL

## 2024-12-02 VITALS
DIASTOLIC BLOOD PRESSURE: 71 MMHG | RESPIRATION RATE: 13 BRPM | BODY MASS INDEX: 29.4 KG/M2 | TEMPERATURE: 98 F | WEIGHT: 210 LBS | HEART RATE: 71 BPM | OXYGEN SATURATION: 95 % | SYSTOLIC BLOOD PRESSURE: 108 MMHG | HEIGHT: 71 IN

## 2024-12-02 DIAGNOSIS — I35.0 AORTIC STENOSIS: ICD-10-CM

## 2024-12-02 DIAGNOSIS — Z01.810 PRE-OPERATIVE CARDIOVASCULAR EXAMINATION: ICD-10-CM

## 2024-12-02 DIAGNOSIS — Z01.818 PRE-OP TESTING: Primary | ICD-10-CM

## 2024-12-02 PROCEDURE — 93567 NJX CAR CTH SPRVLV AORTGRPHY: CPT | Performed by: INTERNAL MEDICINE

## 2024-12-02 PROCEDURE — 93454 CORONARY ARTERY ANGIO S&I: CPT | Performed by: INTERNAL MEDICINE

## 2024-12-02 PROCEDURE — B2111ZZ FLUOROSCOPY OF MULTIPLE CORONARY ARTERIES USING LOW OSMOLAR CONTRAST: ICD-10-PCS | Performed by: INTERNAL MEDICINE

## 2024-12-02 PROCEDURE — 99152 MOD SED SAME PHYS/QHP 5/>YRS: CPT | Performed by: INTERNAL MEDICINE

## 2024-12-02 PROCEDURE — 99153 MOD SED SAME PHYS/QHP EA: CPT | Performed by: INTERNAL MEDICINE

## 2024-12-02 RX ORDER — IOPAMIDOL 755 MG/ML
200 INJECTION, SOLUTION INTRAVASCULAR
Status: COMPLETED | OUTPATIENT
Start: 2024-12-02 | End: 2024-12-02

## 2024-12-02 RX ORDER — NITROGLYCERIN 20 MG/100ML
INJECTION INTRAVENOUS
Status: COMPLETED
Start: 2024-12-02 | End: 2024-12-02

## 2024-12-02 RX ORDER — HEPARIN SODIUM 5000 [USP'U]/ML
INJECTION, SOLUTION INTRAVENOUS; SUBCUTANEOUS
Status: DISCONTINUED
Start: 2024-12-02 | End: 2024-12-02 | Stop reason: WASHOUT

## 2024-12-02 RX ORDER — MIDAZOLAM HYDROCHLORIDE 1 MG/ML
INJECTION INTRAMUSCULAR; INTRAVENOUS
Status: COMPLETED
Start: 2024-12-02 | End: 2024-12-02

## 2024-12-02 RX ORDER — HEPARIN SODIUM 5000 [USP'U]/ML
INJECTION, SOLUTION INTRAVENOUS; SUBCUTANEOUS
Status: COMPLETED
Start: 2024-12-02 | End: 2024-12-02

## 2024-12-02 RX ORDER — SODIUM CHLORIDE 9 MG/ML
INJECTION, SOLUTION INTRAVENOUS
Status: DISCONTINUED | OUTPATIENT
Start: 2024-12-03 | End: 2024-12-02 | Stop reason: HOSPADM

## 2024-12-02 RX ORDER — VERAPAMIL HYDROCHLORIDE 2.5 MG/ML
INJECTION, SOLUTION INTRAVENOUS
Status: COMPLETED
Start: 2024-12-02 | End: 2024-12-02

## 2024-12-02 RX ORDER — LIDOCAINE HYDROCHLORIDE 10 MG/ML
INJECTION, SOLUTION EPIDURAL; INFILTRATION; INTRACAUDAL; PERINEURAL
Status: COMPLETED
Start: 2024-12-02 | End: 2024-12-02

## 2024-12-02 RX ADMIN — IOPAMIDOL 96 ML: 755 INJECTION, SOLUTION INTRAVASCULAR at 12:47:00

## 2024-12-02 NOTE — PROCEDURES
[facility]    PATIENT'S NAME: LUIS FERNANDO MCMAHON   ATTENDING PHYSICIAN: Juan Francisco Ahn M.D.   OPERATING PHYSICIAN: Juan Francisco Ahn M.D.   PATIENT ACCOUNT#:   [id number]    LOCATION:  [address]  MEDICAL RECORD #:   [id number]       YOB: 1955  ADMISSION DATE:       12/02/2024      OPERATION DATE:  12/02/2024    CARDIAC PROCEDURE TRANSCRIPTION    CARDIAC CATHETERIZATION     PREOPERATIVE DIAGNOSIS:  Severe aortic stenosis.  POSTOPERATIVE DIAGNOSIS:  Coronary artery disease.  PROCEDURE PERFORMED:      SEDATION:  The patient received conscious sedation.  This was monitored by myself and the catheterization lab staff.  This started at 1208 and ended at 1237.    CLINICAL HISTORY:  The patient presents today for angiography prior to open surgical aortic valve replacement.    DESCRIPTION OF PROCEDURE:  After a detailed informed consent was obtained, the patient's right radial was prepped and draped in the usual sterile fashion.  Ultrasound was used to interrogate.  I did give 1 mL of lidocaine over the right radial.  The radial appeared to be diminutive and appeared to spasm.  Therefore, I did not access it.    We switched to the right common femoral artery.  Ultrasound imaging revealed a normal size vessel.  We entered above the bifurcation using a micropuncture technique with placement of a 6-Vietnamese sheath.  Geovanny right, left, and pigtail catheters were used for the diagnostic procedure.  At the end of the case, a 6-Vietnamese Angio-Seal was used to seal the right common femoral artery site.  There were no apparent acute complications noted, and the patient tolerated the procedure well.    CORONARIES:  There appeared to be a dual ostium for the left main and the circumflex.  Injection of the left main coronary artery did not reveal significant disease in the left main. There were no significant septals noted.  The mid LAD appeared to have some area of ectasia.  The area of stenosis  was about 10% to 20%.  No other significant stenosis was identified in the LAD.    The circumflex coronary artery appears to be a large dominant vessel.  It gives rise to a large first obtuse marginal branch.  The takeoff of this obtuse marginal branch is somewhat challenging to visualize, yet there appears to be a 70% to 80% stenosis extending from its ostium into its proximal section.  Main circumflex subsequently gives rise to a second obtuse marginal branch without significant disease.  Main circumflex ends in the AV groove after giving rise to what appears to be a posterior descending artery without significant disease.    The right coronary artery is a small nondominant vessel.    ASCENDING AORTOGRAPHY:  Ascending aortography reveals a heavily calcified aortic valve with 1 to 2+ aortic insufficiency.  Ascending aorta measures approximately 3 cm.    SUMMARY:    1.   Significant coronary artery disease involving an obtuse marginal branch.  2.   Nonobstructive LAD disease.    Dictated By Juan Francisco Ahn M.D.  d: 12/02/2024 12:41:38  t: 12/02/2024 13:41:22  [facility] [id number]  AR/

## 2024-12-02 NOTE — DISCHARGE INSTRUCTIONS
HOME CARE INSTRUCTIONS FOLLOWING CORONARY ANGIOGRAPHY, ANGIOPLASTY (PTCA/PTA) OR INSERTION OF STENT IN THE CORONARY      Activity  DO NOT drive after the procedure.  You may resume driving late the following day according to the nurse or physician's instructions  Plan on resting and relaxing tonight and tomorrow  Resume your normal activity after 48 hours, or as instructed by your physician  Do not lift anything over 10 pounds for 48 hours  Avoid heavy lifting for 1 week  Avoid drinking alcohol for the next 24 hours  If the groin site was used, avoid repeated stair use and excessive walking for the next 24 hours    What is Normal?  A small lump at the procedure site associated with mild tenderness when touched  The procedure site may be bruised or discolored  There may be a small amount of drainage on the bandage    Special Instructions  Drink plenty of fluids during the next 24 hours to \"flush\" the contrast from your system  The bandage is to remain in place for 24 hours  Keep the bandage clean and dry  DO NOT submerge the procedure site for 3 days (no bath tubs or pools)  After 24 hours, you must remove the bandage  You should shower after removing the bandage, and wash the procedure site gently with soap and water  If you choose to wear a bandage for a few days, make sure it remains clean and dry and that it is changed daily    When you should NOTIFY YOUR PHYSICIAN  Bleeding can occur at the procedure site - both on the outside of the skin and/or beneath the surface of the skin  Swelling or a large lump at the procedure site can occur, which may be accompanied by moderate to severe pain    If either of the above occurs, lie down flat.  Have someone apply pressure to the procedure site with both hands, as instructed by the nurse.  Hold pressure for 20 minutes and the bleeding should stop.  Notify your physician of the occurrence  If the bleeding does not stop, call 911 and continue to apply pressure    If you  experience signs of a fever, temperature > 101°, chills, infection (redness, swelling, thick yellow drainage, or a foul odor from the procedure site)  If you notice any numbness, tingling, or loss of feeling to your leg or foot or groin access        Other  You may resume your present diet, unless otherwise specified by your physician.  You may resume all of your medications as prescribed, unless otherwise directed by your physician.  A list of your medications was provided to you at discharge.  Continue the walking program initiated in the hospital and progress your walking as directed.  Or, gradually resume your previous aerobic exercise schedule as tolerated.  Hold metformin/glucophage 48 hours post proceudre.

## 2024-12-02 NOTE — IVS NOTE
Patient discharged home post cardiac cath. Pt is able to sit up and ambulate without difficulty. Pt's vital signs are stable. Right groin procedural access site is dry and intact with no signs and symptoms of bleeding or hematoma. Pt tolerated food/fluids. Dr. Ahn spoke to pt/family post procedure. Instructions provided and pt/family verbalized understanding. PIV removed. Discharged via wheelchair with all belongings.

## 2024-12-02 NOTE — H&P
History & Physical Examination    Patient Name: Lopez Miranda  MRN: [id number]  CSN: [id number]  YOB: 1955    Diagnosis: Severe AS    Present Illness: Patient with severe aortic stenosis.  Plan is for SAVR.    Prescriptions Prior to Admission[1]    Allergies: Allergies[2]    Past Medical History:    A-fib (HCC)    Arrhythmia    Coronary artery disease involving native coronary artery of native heart without angina pectoris    Coronary atherosclerosis    Essential hypertension    High blood pressure    High cholesterol    Hyperlipidemia     Past Surgical History:   Procedure Laterality Date    Colonoscopy      Other      right shoulder      History reviewed. No pertinent family history.  Social History     Tobacco Use    Smoking status: Never    Smokeless tobacco: Never   Substance Use Topics    Alcohol use: Yes     Comment: Uses non-alcoholic socially       SYSTEM Check if Review is Normal Check if Physical Exam is Normal If not normal, please explain:   HEENT [ ] [ x]    NECK & BACK [ ] [ x]    HEART [ ] YU    LUNGS [ ] [ x]    ABDOMEN [ ] [ x]    UROGENITAL [ ] [ ]    EXTREMITIES [ ] [ x]    OTHER        Plan:   Risks, benefits, and alternatives of cardiac cath/PCI discussed in detail.  Risks include but not limited to death, MI, CVA, emergency CABG, transfusion, and surgical repair of vascular complications.    Questions answered.  Patient agrees to proceed.    Juan Francisco Ahn MD                   [1]   Medications Prior to Admission   Medication Sig Dispense Refill Last Dose/Taking    methIMAzole 10 MG Oral Tab Take 1 tablet (10 mg total) by mouth every morning AND 1.5 tablets (15 mg total) every evening. 225 tablet 0 12/2/2024 Morning    Multiple Vitamin (MULTI-VITAMIN) Oral Tab 1 tablet daily.   Taking    apixaban 5 MG Oral Tab Take 1 tablet (5 mg total) by mouth 2 (two) times daily.   11/29/2024    enalapril 5 MG Oral Tab Take 1 tablet (5 mg total) by mouth daily. 30 tablet 0 12/2/2024  Morning    OMEPRAZOLE 20 MG Oral Capsule Delayed Release TAKE 1 CAPSULE BY MOUTH EVERY DAY 90 capsule 0 12/2/2024 Morning    Rosuvastatin Calcium 10 MG Oral Tab Take 1 tablet (10 mg total) by mouth nightly.   12/2/2024 Morning    aspirin 81 MG Oral Chew Tab Chew 2 tablets (162 mg total) by mouth daily.   12/2/2024 Morning   [2] No Known Allergies

## 2024-12-20 RX ORDER — METHIMAZOLE 10 MG/1
15 TABLET ORAL NIGHTLY
COMMUNITY
End: 2024-12-30

## 2024-12-30 ENCOUNTER — HOSPITAL ENCOUNTER (OUTPATIENT)
Dept: INTERVENTIONAL RADIOLOGY/VASCULAR | Facility: HOSPITAL | Age: 69
Discharge: HOME | End: 2024-12-30
Attending: THORACIC SURGERY (CARDIOTHORACIC VASCULAR SURGERY)
Payer: COMMERCIAL

## 2024-12-30 ENCOUNTER — HOSPITAL ENCOUNTER (OUTPATIENT)
Dept: CV DIAGNOSTICS | Facility: HOSPITAL | Age: 69
Discharge: HOME | End: 2024-12-30
Attending: THORACIC SURGERY (CARDIOTHORACIC VASCULAR SURGERY)
Payer: COMMERCIAL

## 2024-12-30 ENCOUNTER — HOSPITAL ENCOUNTER (OUTPATIENT)
Dept: CARDIOLOGY CLINIC | Facility: HOSPITAL | Age: 69
Discharge: HOME | End: 2024-12-30
Attending: THORACIC SURGERY (CARDIOTHORACIC VASCULAR SURGERY)
Payer: COMMERCIAL

## 2024-12-30 ENCOUNTER — HOSPITAL ENCOUNTER (OUTPATIENT)
Dept: LAB | Facility: HOSPITAL | Age: 69
Discharge: HOME | End: 2024-12-30
Attending: THORACIC SURGERY (CARDIOTHORACIC VASCULAR SURGERY)
Payer: COMMERCIAL

## 2024-12-30 ENCOUNTER — HOSPITAL ENCOUNTER (OUTPATIENT)
Dept: GENERAL RADIOLOGY | Facility: HOSPITAL | Age: 69
Discharge: HOME | End: 2024-12-30
Attending: THORACIC SURGERY (CARDIOTHORACIC VASCULAR SURGERY)
Payer: COMMERCIAL

## 2024-12-30 DIAGNOSIS — Z01.818 PRE-OP TESTING: ICD-10-CM

## 2024-12-30 DIAGNOSIS — I35.0 AORTIC STENOSIS: ICD-10-CM

## 2024-12-30 LAB
ALBUMIN SERPL-MCNC: 4.8 G/DL (ref 3.2–4.8)
ALBUMIN/GLOB SERPL: 1.8 {RATIO} (ref 1–2)
ALP LIVER SERPL-CCNC: 82 U/L
ALT SERPL-CCNC: 12 U/L
ANION GAP SERPL CALC-SCNC: 9 MMOL/L (ref 0–18)
ANTIBODY SCREEN: NEGATIVE
APTT PPP: 29.3 SECONDS (ref 23–36)
AST SERPL-CCNC: 19 U/L (ref ?–34)
ATRIAL RATE: 73 BPM
BASOPHILS # BLD AUTO: 0.03 X10(3) UL (ref 0–0.2)
BASOPHILS NFR BLD AUTO: 0.5 %
BILIRUB SERPL-MCNC: 0.7 MG/DL (ref 0.2–1.1)
BILIRUB UR QL STRIP.AUTO: NEGATIVE
BUN BLD-MCNC: 13 MG/DL (ref 9–23)
CALCIUM BLD-MCNC: 9.5 MG/DL (ref 8.7–10.4)
CHLORIDE SERPL-SCNC: 105 MMOL/L (ref 98–112)
CLARITY UR REFRACT.AUTO: CLEAR
CO2 SERPL-SCNC: 26 MMOL/L (ref 21–32)
COLOR UR AUTO: COLORLESS
CREAT BLD-MCNC: 0.88 MG/DL
EGFRCR SERPLBLD CKD-EPI 2021: 94 ML/MIN/1.73M2 (ref 60–?)
EOSINOPHIL # BLD AUTO: 0.14 X10(3) UL (ref 0–0.7)
EOSINOPHIL NFR BLD AUTO: 2.2 %
ERYTHROCYTE [DISTWIDTH] IN BLOOD BY AUTOMATED COUNT: 12.2 %
EST. AVERAGE GLUCOSE BLD GHB EST-MCNC: 100 MG/DL (ref 68–126)
GLOBULIN PLAS-MCNC: 2.7 G/DL (ref 2–3.5)
GLUCOSE BLD-MCNC: 98 MG/DL (ref 70–99)
GLUCOSE UR STRIP.AUTO-MCNC: NORMAL MG/DL
HBA1C MFR BLD: 5.1 % (ref ?–5.7)
HCT VFR BLD AUTO: 38.9 %
HGB BLD-MCNC: 13.5 G/DL
IMM GRANULOCYTES # BLD AUTO: 0.03 X10(3) UL (ref 0–1)
IMM GRANULOCYTES NFR BLD: 0.5 %
INR BLD: 1.19 (ref 0.8–1.2)
KETONES UR STRIP.AUTO-MCNC: NEGATIVE MG/DL
LEUKOCYTE ESTERASE UR QL STRIP.AUTO: NEGATIVE
LYMPHOCYTES # BLD AUTO: 1.03 X10(3) UL (ref 1–4)
LYMPHOCYTES NFR BLD AUTO: 16.1 %
MCH RBC QN AUTO: 30.4 PG (ref 26–34)
MCHC RBC AUTO-ENTMCNC: 34.7 G/DL (ref 31–37)
MCV RBC AUTO: 87.6 FL
MONOCYTES # BLD AUTO: 0.48 X10(3) UL (ref 0.1–1)
MONOCYTES NFR BLD AUTO: 7.5 %
NEUTROPHILS # BLD AUTO: 4.67 X10 (3) UL (ref 1.5–7.7)
NEUTROPHILS # BLD AUTO: 4.67 X10(3) UL (ref 1.5–7.7)
NEUTROPHILS NFR BLD AUTO: 73.2 %
NITRITE UR QL STRIP.AUTO: NEGATIVE
OSMOLALITY SERPL CALC.SUM OF ELEC: 290 MOSM/KG (ref 275–295)
P AXIS: 61 DEGREES
P-R INTERVAL: 244 MS
PH UR STRIP.AUTO: 7 [PH] (ref 5–8)
PLATELET # BLD AUTO: 145 10(3)UL (ref 150–450)
POTASSIUM SERPL-SCNC: 4.1 MMOL/L (ref 3.5–5.1)
PROT SERPL-MCNC: 7.5 G/DL (ref 5.7–8.2)
PROT UR STRIP.AUTO-MCNC: NEGATIVE MG/DL
PROTHROMBIN TIME: 15.3 SECONDS (ref 11.6–14.8)
Q-T INTERVAL: 372 MS
QRS DURATION: 88 MS
QTC CALCULATION (BEZET): 409 MS
R AXIS: 19 DEGREES
RBC # BLD AUTO: 4.44 X10(6)UL
RBC UR QL AUTO: NEGATIVE
RH BLOOD TYPE: POSITIVE
SODIUM SERPL-SCNC: 140 MMOL/L (ref 136–145)
SP GR UR STRIP.AUTO: <1.005 (ref 1–1.03)
T AXIS: 42 DEGREES
UROBILINOGEN UR STRIP.AUTO-MCNC: NORMAL MG/DL
VENTRICULAR RATE: 73 BPM
WBC # BLD AUTO: 6.4 X10(3) UL (ref 4–11)

## 2024-12-30 PROCEDURE — 86900 BLOOD TYPING SEROLOGIC ABO: CPT | Performed by: THORACIC SURGERY (CARDIOTHORACIC VASCULAR SURGERY)

## 2024-12-30 PROCEDURE — 86850 RBC ANTIBODY SCREEN: CPT | Performed by: THORACIC SURGERY (CARDIOTHORACIC VASCULAR SURGERY)

## 2024-12-30 PROCEDURE — 86920 COMPATIBILITY TEST SPIN: CPT

## 2024-12-30 PROCEDURE — 36415 COLL VENOUS BLD VENIPUNCTURE: CPT | Performed by: THORACIC SURGERY (CARDIOTHORACIC VASCULAR SURGERY)

## 2024-12-30 PROCEDURE — 85025 COMPLETE CBC W/AUTO DIFF WBC: CPT | Performed by: THORACIC SURGERY (CARDIOTHORACIC VASCULAR SURGERY)

## 2024-12-30 PROCEDURE — 83036 HEMOGLOBIN GLYCOSYLATED A1C: CPT | Performed by: THORACIC SURGERY (CARDIOTHORACIC VASCULAR SURGERY)

## 2024-12-30 PROCEDURE — 93010 ELECTROCARDIOGRAM REPORT: CPT | Performed by: INTERNAL MEDICINE

## 2024-12-30 PROCEDURE — 71045 X-RAY EXAM CHEST 1 VIEW: CPT | Performed by: THORACIC SURGERY (CARDIOTHORACIC VASCULAR SURGERY)

## 2024-12-30 PROCEDURE — 80053 COMPREHEN METABOLIC PANEL: CPT | Performed by: THORACIC SURGERY (CARDIOTHORACIC VASCULAR SURGERY)

## 2024-12-30 PROCEDURE — 85610 PROTHROMBIN TIME: CPT | Performed by: THORACIC SURGERY (CARDIOTHORACIC VASCULAR SURGERY)

## 2024-12-30 PROCEDURE — 85730 THROMBOPLASTIN TIME PARTIAL: CPT | Performed by: THORACIC SURGERY (CARDIOTHORACIC VASCULAR SURGERY)

## 2024-12-30 PROCEDURE — 81003 URINALYSIS AUTO W/O SCOPE: CPT | Performed by: THORACIC SURGERY (CARDIOTHORACIC VASCULAR SURGERY)

## 2024-12-30 PROCEDURE — 93005 ELECTROCARDIOGRAM TRACING: CPT

## 2024-12-30 PROCEDURE — 86901 BLOOD TYPING SEROLOGIC RH(D): CPT | Performed by: THORACIC SURGERY (CARDIOTHORACIC VASCULAR SURGERY)

## 2024-12-30 NOTE — PROGRESS NOTES
Met with patient in PAT to discuss pre op teaching, post op expectations, discharge planning.  Discussed roles of CM/SW, PT/OT, Cardiac rehab in education and recovery.  All questions answered, binder given.  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 Pt lives alone, has two children and a partner who will be available once home although he doesn't plan to have someone with him at all times, encouraged him to discuss with family the potential need to have someone stay with him for a short period of time, he understands and is aware he will need transport to MD appts. Will follow up post op.    Of note, reviewed cath with Dr. Diaz, pt will need CABG x 1, d/w patient, he understands and agrees, he has chosen TISSUE VALVE.   Tiffanie Morrison RN  Clinical Coordinator  CV Surgery

## 2024-12-30 NOTE — PAT NURSING NOTE
PAT nursing note: met with patient in [facility] to review pre-surgical instructions for his upcoming surgery with Dr. Diaz on 1/6; testing underway; reviewed binder, medication stop dates, shower schedule/instructions; npo after 2200; continue to take all prescribed medications as directed except: the morning of surgery do not take any medication; last dose of vitamins, supplements, herbal products and NSAIDs 12/29, ASA 12/31, Eliquis 12/31, Enalapril 1/2; denies taking other blood thinners, diabetic or weight loss medications; no PPM, ICD, no nerve or spinal cord stimulator; admit; 2 visitors welcome; park in the [place] [employer] garage at [facility]; register at the [facility] reception desk in the lobby at 0530 am; keep personal possessions to a minimum: bring insurance card(s)/picture ID and binder, pajama bottoms, slippers, toiletries, wear comfortable clothing, bring glasses/eye glass case, bring denture cup/supplies; leave all valuables at home, including jewelry; discussed intra-op and post-op instructions; all questions answered; patient verbalized understanding of all instructions.

## 2025-01-03 NOTE — CM/SW NOTE
Attempted to reach out to patient by phone to do assessment prior to his scheduled surgery with Dr Diaz on Monday, Jan 6, 2025--message left on patient's cell phone

## 2025-01-06 ENCOUNTER — ANESTHESIA (OUTPATIENT)
Dept: CARDIAC SURGERY | Facility: HOSPITAL | Age: 70
End: 2025-01-06
Payer: MEDICARE

## 2025-01-06 ENCOUNTER — HOSPITAL ENCOUNTER (INPATIENT)
Facility: HOSPITAL | Age: 70
LOS: 4 days | Discharge: HOME HEALTH | End: 2025-01-10
Attending: THORACIC SURGERY (CARDIOTHORACIC VASCULAR SURGERY) | Admitting: THORACIC SURGERY (CARDIOTHORACIC VASCULAR SURGERY)
Payer: MEDICARE

## 2025-01-06 ENCOUNTER — APPOINTMENT (OUTPATIENT)
Dept: GENERAL RADIOLOGY | Facility: HOSPITAL | Age: 70
End: 2025-01-06
Attending: PHYSICIAN ASSISTANT
Payer: MEDICARE

## 2025-01-06 ENCOUNTER — ANESTHESIA EVENT (OUTPATIENT)
Dept: CARDIAC SURGERY | Facility: HOSPITAL | Age: 70
End: 2025-01-06
Payer: MEDICARE

## 2025-01-06 DIAGNOSIS — G89.18 POST-OPERATIVE PAIN: ICD-10-CM

## 2025-01-06 DIAGNOSIS — I35.0 AORTIC STENOSIS: Primary | ICD-10-CM

## 2025-01-06 DIAGNOSIS — Z01.818 PRE-OP TESTING: ICD-10-CM

## 2025-01-06 DIAGNOSIS — J90 PLEURAL EFFUSION: ICD-10-CM

## 2025-01-06 DIAGNOSIS — I25.10 CORONARY ARTERY DISEASE INVOLVING NATIVE CORONARY ARTERY OF NATIVE HEART WITHOUT ANGINA PECTORIS: ICD-10-CM

## 2025-01-06 PROBLEM — E78.5 HYPERLIPIDEMIA: Status: ACTIVE | Noted: 2025-01-06

## 2025-01-06 PROBLEM — I10 PRIMARY HYPERTENSION: Status: ACTIVE | Noted: 2025-01-06

## 2025-01-06 PROBLEM — R73.9 HYPERGLYCEMIA: Status: ACTIVE | Noted: 2025-01-06

## 2025-01-06 LAB
APTT PPP: 30 SECONDS (ref 23–36)
APTT PPP: 34.6 SECONDS (ref 23–36)
BASE EXCESS BLD CALC-SCNC: -1 MMOL/L
BASE EXCESS BLD CALC-SCNC: -3 MMOL/L
BASE EXCESS BLDA CALC-SCNC: -1 MMOL/L (ref ?–30)
BASE EXCESS BLDA CALC-SCNC: 2.9 MMOL/L (ref ?–2)
BASE EXCESS BLDV CALC-SCNC: -2 MMOL/L (ref ?–30)
BASE EXCESS BLDV CALC-SCNC: -2 MMOL/L (ref ?–30)
BASE EXCESS BLDV CALC-SCNC: 0 MMOL/L (ref ?–30)
BASE EXCESS BLDV CALC-SCNC: 2 MMOL/L (ref ?–30)
BODY TEMPERATURE: 98.6 F
BUN BLD-MCNC: 11 MG/DL (ref 9–23)
CA-I BLD-SCNC: 1.14 MMOL/L (ref 1.12–1.32)
CA-I BLD-SCNC: 1.28 MMOL/L (ref 0.95–1.32)
CA-I BLD-SCNC: 1.35 MMOL/L (ref 1.12–1.32)
CA-I BLDA-SCNC: 1.23 MMOL/L (ref 1.12–1.32)
CA-I BLDV-SCNC: 1.07 MMOL/L (ref 1.12–1.32)
CA-I BLDV-SCNC: 1.07 MMOL/L (ref 1.12–1.32)
CA-I BLDV-SCNC: 1.11 MMOL/L (ref 1.12–1.32)
CA-I BLDV-SCNC: 1.11 MMOL/L (ref 1.12–1.32)
CALCIUM BLD-MCNC: 9.8 MG/DL (ref 8.7–10.4)
CHLORIDE SERPL-SCNC: 112 MMOL/L (ref 98–112)
CO2 BLD-SCNC: 22 MMOL/L (ref 22–32)
CO2 BLD-SCNC: 25 MMOL/L (ref 22–32)
CO2 BLDA-SCNC: 25 MMOL/L (ref 22–32)
CO2 BLDV-SCNC: 25 MMOL/L (ref 22–32)
CO2 BLDV-SCNC: 28 MMOL/L (ref 22–32)
CO2 SERPL-SCNC: 26 MMOL/L (ref 21–32)
COHGB MFR BLD: 1.6 % SAT (ref 0–3)
CREAT BLD-MCNC: 0.79 MG/DL
EGFRCR SERPLBLD CKD-EPI 2021: 97 ML/MIN/1.73M2 (ref 60–?)
ERYTHROCYTE [DISTWIDTH] IN BLOOD BY AUTOMATED COUNT: 12.3 %
FIBRINOGEN PPP-MCNC: 262 MG/DL (ref 200–480)
FIBRINOGEN PPP-MCNC: 283 MG/DL (ref 200–480)
FIO2: 50 %
GLUCOSE BLD-MCNC: 123 MG/DL (ref 70–99)
GLUCOSE BLD-MCNC: 124 MG/DL (ref 70–99)
GLUCOSE BLD-MCNC: 125 MG/DL (ref 70–99)
GLUCOSE BLD-MCNC: 128 MG/DL (ref 70–99)
GLUCOSE BLD-MCNC: 130 MG/DL (ref 70–99)
GLUCOSE BLD-MCNC: 133 MG/DL (ref 70–99)
GLUCOSE BLD-MCNC: 136 MG/DL (ref 70–99)
GLUCOSE BLD-MCNC: 137 MG/DL (ref 70–99)
GLUCOSE BLD-MCNC: 140 MG/DL (ref 70–99)
GLUCOSE BLD-MCNC: 152 MG/DL (ref 70–99)
GLUCOSE BLD-MCNC: 166 MG/DL (ref 70–99)
GLUCOSE BLD-MCNC: 194 MG/DL (ref 70–99)
GLUCOSE BLD-MCNC: 223 MG/DL (ref 70–99)
GLUCOSE BLD-MCNC: 224 MG/DL (ref 70–99)
GLUCOSE BLD-MCNC: 229 MG/DL (ref 70–99)
GLUCOSE BLD-MCNC: 97 MG/DL (ref 70–99)
GLUCOSE BLDA-MCNC: 125 MG/DL (ref 70–99)
GLUCOSE BLDV-MCNC: 112 MG/DL (ref 70–99)
GLUCOSE BLDV-MCNC: 116 MG/DL (ref 70–99)
GLUCOSE BLDV-MCNC: 120 MG/DL (ref 70–99)
GLUCOSE BLDV-MCNC: 139 MG/DL (ref 70–99)
HCO3 BLD-SCNC: 21.4 MEQ/L
HCO3 BLD-SCNC: 23.9 MEQ/L
HCO3 BLDA-SCNC: 23.5 MEQ/L (ref 22–26)
HCO3 BLDA-SCNC: 23.6 MEQ/L (ref 22–26)
HCO3 BLDA-SCNC: 24.1 MEQ/L (ref 22–26)
HCO3 BLDA-SCNC: 24.3 MEQ/L (ref 22–26)
HCO3 BLDA-SCNC: 26.9 MEQ/L (ref 22–26)
HCO3 BLDA-SCNC: 27.2 MEQ/L (ref 21–27)
HCT VFR BLD AUTO: 30.2 %
HCT VFR BLD CALC: 27 %
HCT VFR BLD CALC: 33 %
HCT VFR BLDA CALC: 26 %
HCT VFR BLDV CALC: 27 %
HCT VFR BLDV CALC: 28 %
HGB BLD-MCNC: 10.2 G/DL
HGB BLD-MCNC: 10.4 G/DL
INR BLD: 1.34 (ref 0.8–1.2)
INR BLD: 1.46 (ref 0.8–1.2)
ISTAT ACTIVATED CLOTTING TIME: 124 SECONDS (ref 74–137)
ISTAT ACTIVATED CLOTTING TIME: 129 SECONDS (ref 74–137)
ISTAT ACTIVATED CLOTTING TIME: 527 SECONDS (ref 74–137)
ISTAT ACTIVATED CLOTTING TIME: 631 SECONDS (ref 74–137)
ISTAT ACTIVATED CLOTTING TIME: 809 SECONDS (ref 74–137)
ISTAT ACTIVATED CLOTTING TIME: 942 SECONDS (ref 74–137)
ISTAT ACTIVATED CLOTTING TIME: 942 SECONDS (ref 74–137)
ISTAT PATIENT TEMPERATURE: 26 DEGREE
ISTAT PATIENT TEMPERATURE: 26 DEGREE
ISTAT PATIENT TEMPERATURE: 28 DEGREE
ISTAT PATIENT TEMPERATURE: 35 DEGREE
ISTAT PATIENT TEMPERATURE: 37 DEGREE
LACTATE BLD-SCNC: 1.5 MMOL/L (ref 0.5–2)
MAGNESIUM SERPL-MCNC: 3 MG/DL (ref 1.6–2.6)
MCH RBC QN AUTO: 29.6 PG (ref 26–34)
MCHC RBC AUTO-ENTMCNC: 34.4 G/DL (ref 31–37)
MCV RBC AUTO: 86 FL
METHGB MFR BLD: 0.5 % SAT (ref 0.4–1.5)
MRSA DNA SPEC QL NAA+PROBE: NEGATIVE
OXYHGB MFR BLDA: 97.9 % (ref 92–100)
PCO2 BLD: 34.7 MMHG
PCO2 BLD: 40.9 MMHG
PCO2 BLDA: 37.3 MMHG (ref 35–45)
PCO2 BLDA: 39 MM HG (ref 35–45)
PCO2 BLDV: 27.1 MMHG (ref 38–50)
PCO2 BLDV: 29.1 MMHG (ref 38–50)
PCO2 BLDV: 31.6 MMHG (ref 38–50)
PCO2 BLDV: 34.5 MMHG (ref 38–50)
PEEP: 5 CM H2O
PH BLD: 7.38 [PH]
PH BLD: 7.4 [PH]
PH BLDA: 7.41 [PH] (ref 7.35–7.45)
PH BLDA: 7.45 [PH] (ref 7.35–7.45)
PH BLDV: 7.45 [PH] (ref 7.32–7.43)
PH BLDV: 7.45 [PH] (ref 7.32–7.43)
PH BLDV: 7.5 [PH] (ref 7.32–7.43)
PH BLDV: 7.52 [PH] (ref 7.32–7.43)
PLATELET # BLD AUTO: 101 10(3)UL (ref 150–450)
PLATELET # BLD AUTO: 76 10(3)UL (ref 150–450)
PLATELETS.RETICULATED NFR BLD AUTO: 1.4 % (ref 0–7)
PO2 BLD: 167 MMHG
PO2 BLD: 375 MMHG
PO2 BLDA: 166 MMHG (ref 80–105)
PO2 BLDA: 181 MM HG (ref 80–100)
PO2 BLDV: <70 MMHG (ref 35–40)
POTASSIUM BLD-SCNC: 3.6 MMOL/L (ref 3.6–5.1)
POTASSIUM BLD-SCNC: 4.1 MMOL/L (ref 3.6–5.1)
POTASSIUM BLD-SCNC: 4.4 MMOL/L (ref 3.6–5.1)
POTASSIUM SERPL-SCNC: 4.7 MMOL/L (ref 3.5–5.1)
PRESSURE SUPPORT: 5 CM H2O
PROTHROMBIN TIME: 16.7 SECONDS (ref 11.6–14.8)
PROTHROMBIN TIME: 17.9 SECONDS (ref 11.6–14.8)
RBC # BLD AUTO: 3.51 X10(6)UL
RH BLOOD TYPE: POSITIVE
SAO2 % BLD: 100 %
SAO2 % BLD: 99 %
SAO2 % BLDA: 100 % (ref 92–100)
SAO2 % BLDV: 82 % (ref 60–85)
SAO2 % BLDV: 86 % (ref 60–85)
SAO2 % BLDV: 91 % (ref 60–85)
SAO2 % BLDV: 92 % (ref 60–85)
SODIUM BLD-SCNC: 139 MMOL/L (ref 135–145)
SODIUM BLD-SCNC: 142 MMOL/L (ref 136–145)
SODIUM BLD-SCNC: 142 MMOL/L (ref 136–145)
SODIUM BLDA-SCNC: 138 MMOL/L (ref 136–145)
SODIUM BLDA-SCNC: 5.2 MMOL/L (ref 3.6–5.1)
SODIUM BLDV-SCNC: 135 MMOL/L (ref 136–145)
SODIUM BLDV-SCNC: 135 MMOL/L (ref 136–145)
SODIUM BLDV-SCNC: 136 MMOL/L (ref 136–145)
SODIUM BLDV-SCNC: 137 MMOL/L (ref 136–145)
SODIUM BLDV-SCNC: 4.7 MMOL/L (ref 3.6–5.1)
SODIUM BLDV-SCNC: 5.6 MMOL/L (ref 3.6–5.1)
SODIUM BLDV-SCNC: 6.7 MMOL/L (ref 3.6–5.1)
SODIUM BLDV-SCNC: 7.7 MMOL/L (ref 3.6–5.1)
SODIUM SERPL-SCNC: 142 MMOL/L (ref 136–145)
WBC # BLD AUTO: 7.9 X10(3) UL (ref 4–11)

## 2025-01-06 PROCEDURE — 71045 X-RAY EXAM CHEST 1 VIEW: CPT | Performed by: PHYSICIAN ASSISTANT

## 2025-01-06 PROCEDURE — 02B70ZK EXCISION OF LEFT ATRIAL APPENDAGE, OPEN APPROACH: ICD-10-PCS | Performed by: THORACIC SURGERY (CARDIOTHORACIC VASCULAR SURGERY)

## 2025-01-06 PROCEDURE — 06BQ0ZZ EXCISION OF LEFT SAPHENOUS VEIN, OPEN APPROACH: ICD-10-PCS | Performed by: THORACIC SURGERY (CARDIOTHORACIC VASCULAR SURGERY)

## 2025-01-06 PROCEDURE — 99223 1ST HOSP IP/OBS HIGH 75: CPT | Performed by: HOSPITALIST

## 2025-01-06 PROCEDURE — 02580ZZ DESTRUCTION OF CONDUCTION MECHANISM, OPEN APPROACH: ICD-10-PCS | Performed by: THORACIC SURGERY (CARDIOTHORACIC VASCULAR SURGERY)

## 2025-01-06 PROCEDURE — 02RF08Z REPLACEMENT OF AORTIC VALVE WITH ZOOPLASTIC TISSUE, OPEN APPROACH: ICD-10-PCS | Performed by: THORACIC SURGERY (CARDIOTHORACIC VASCULAR SURGERY)

## 2025-01-06 PROCEDURE — 36430 TRANSFUSION BLD/BLD COMPNT: CPT | Performed by: ANESTHESIOLOGY

## 2025-01-06 PROCEDURE — 5A1221Z PERFORMANCE OF CARDIAC OUTPUT, CONTINUOUS: ICD-10-PCS | Performed by: THORACIC SURGERY (CARDIOTHORACIC VASCULAR SURGERY)

## 2025-01-06 PROCEDURE — 021009W BYPASS CORONARY ARTERY, ONE ARTERY FROM AORTA WITH AUTOLOGOUS VENOUS TISSUE, OPEN APPROACH: ICD-10-PCS | Performed by: THORACIC SURGERY (CARDIOTHORACIC VASCULAR SURGERY)

## 2025-01-06 PROCEDURE — 93312 ECHO TRANSESOPHAGEAL: CPT | Performed by: ANESTHESIOLOGY

## 2025-01-06 DEVICE — BARD® PTFE FELT PLEDGETS, (OVAL), 4.8 MM X 6 MM
Type: IMPLANTABLE DEVICE
Brand: BARD® PTFE FELT PLEDGETS

## 2025-01-06 DEVICE — LOCKING SCREW,AXS,SELF-DRILLING
Type: IMPLANTABLE DEVICE | Status: FUNCTIONAL
Brand: AXS, SMARTLOCK

## 2025-01-06 DEVICE — STERNALPLATE, X: Type: IMPLANTABLE DEVICE | Status: FUNCTIONAL

## 2025-01-06 DEVICE — INSPIRIS RESILIA AORTIC VALVE
Type: IMPLANTABLE DEVICE | Site: AORTA | Status: FUNCTIONAL
Brand: INSPIRIS RESILIA AORTIC VALVE

## 2025-01-06 RX ORDER — HEPARIN SODIUM 1000 [USP'U]/ML
INJECTION, SOLUTION INTRAVENOUS; SUBCUTANEOUS AS NEEDED
Status: DISCONTINUED | OUTPATIENT
Start: 2025-01-06 | End: 2025-01-06 | Stop reason: SURG

## 2025-01-06 RX ORDER — ONDANSETRON 2 MG/ML
4 INJECTION INTRAMUSCULAR; INTRAVENOUS EVERY 6 HOURS PRN
Status: DISCONTINUED | OUTPATIENT
Start: 2025-01-06 | End: 2025-01-08

## 2025-01-06 RX ORDER — POTASSIUM CHLORIDE 29.8 MG/ML
40 INJECTION INTRAVENOUS AS NEEDED
Status: DISCONTINUED | OUTPATIENT
Start: 2025-01-06 | End: 2025-01-07

## 2025-01-06 RX ORDER — NICOTINE POLACRILEX 4 MG
30 LOZENGE BUCCAL
Status: DISCONTINUED | OUTPATIENT
Start: 2025-01-06 | End: 2025-01-10

## 2025-01-06 RX ORDER — MAGNESIUM SULFATE HEPTAHYDRATE 40 MG/ML
2 INJECTION, SOLUTION INTRAVENOUS AS NEEDED
Status: DISCONTINUED | OUTPATIENT
Start: 2025-01-06 | End: 2025-01-07

## 2025-01-06 RX ORDER — ASPIRIN 81 MG/1
81 TABLET, CHEWABLE ORAL 2 TIMES DAILY
Status: DISCONTINUED | OUTPATIENT
Start: 2025-01-07 | End: 2025-01-10

## 2025-01-06 RX ORDER — METHIMAZOLE 10 MG/1
10 TABLET ORAL EVERY MORNING
Status: DISCONTINUED | OUTPATIENT
Start: 2025-01-07 | End: 2025-01-10

## 2025-01-06 RX ORDER — POTASSIUM CHLORIDE 14.9 MG/ML
20 INJECTION INTRAVENOUS AS NEEDED
Status: DISCONTINUED | OUTPATIENT
Start: 2025-01-06 | End: 2025-01-07

## 2025-01-06 RX ORDER — NALOXONE HYDROCHLORIDE 0.4 MG/ML
0.08 INJECTION, SOLUTION INTRAMUSCULAR; INTRAVENOUS; SUBCUTANEOUS
Status: DISCONTINUED | OUTPATIENT
Start: 2025-01-06 | End: 2025-01-08

## 2025-01-06 RX ORDER — SENNOSIDES 8.6 MG
8.6 TABLET ORAL 2 TIMES DAILY
Status: DISCONTINUED | OUTPATIENT
Start: 2025-01-07 | End: 2025-01-10

## 2025-01-06 RX ORDER — DIPHENHYDRAMINE HYDROCHLORIDE 50 MG/ML
12.5 INJECTION INTRAMUSCULAR; INTRAVENOUS EVERY 4 HOURS PRN
Status: DISCONTINUED | OUTPATIENT
Start: 2025-01-06 | End: 2025-01-08

## 2025-01-06 RX ORDER — ALBUMIN HUMAN 50 G/1000ML
12.5 SOLUTION INTRAVENOUS ONCE AS NEEDED
Status: ACTIVE | OUTPATIENT
Start: 2025-01-06 | End: 2025-01-07

## 2025-01-06 RX ORDER — BISACODYL 10 MG
10 SUPPOSITORY, RECTAL RECTAL
Status: DISCONTINUED | OUTPATIENT
Start: 2025-01-06 | End: 2025-01-10

## 2025-01-06 RX ORDER — VANCOMYCIN HYDROCHLORIDE
15 EVERY 12 HOURS
Status: COMPLETED | OUTPATIENT
Start: 2025-01-06 | End: 2025-01-07

## 2025-01-06 RX ORDER — CHLORHEXIDINE GLUCONATE ORAL RINSE 1.2 MG/ML
15 SOLUTION DENTAL
Status: DISCONTINUED | OUTPATIENT
Start: 2025-01-06 | End: 2025-01-10

## 2025-01-06 RX ORDER — LIDOCAINE HYDROCHLORIDE 10 MG/ML
INJECTION, SOLUTION EPIDURAL; INFILTRATION; INTRACAUDAL; PERINEURAL AS NEEDED
Status: DISCONTINUED | OUTPATIENT
Start: 2025-01-06 | End: 2025-01-06 | Stop reason: SURG

## 2025-01-06 RX ORDER — MORPHINE SULFATE 2 MG/ML
2 INJECTION, SOLUTION INTRAMUSCULAR; INTRAVENOUS EVERY 2 HOUR PRN
Status: DISCONTINUED | OUTPATIENT
Start: 2025-01-06 | End: 2025-01-10

## 2025-01-06 RX ORDER — PANTOPRAZOLE SODIUM 40 MG/1
40 TABLET, DELAYED RELEASE ORAL
Status: DISCONTINUED | OUTPATIENT
Start: 2025-01-06 | End: 2025-01-10

## 2025-01-06 RX ORDER — POLYETHYLENE GLYCOL 3350 17 G/17G
17 POWDER, FOR SOLUTION ORAL DAILY PRN
Status: DISCONTINUED | OUTPATIENT
Start: 2025-01-06 | End: 2025-01-10

## 2025-01-06 RX ORDER — PROTAMINE SULFATE 10 MG/ML
INJECTION, SOLUTION INTRAVENOUS AS NEEDED
Status: DISCONTINUED | OUTPATIENT
Start: 2025-01-06 | End: 2025-01-06 | Stop reason: SURG

## 2025-01-06 RX ORDER — DEXTROSE MONOHYDRATE 25 G/50ML
50 INJECTION, SOLUTION INTRAVENOUS
Status: DISCONTINUED | OUTPATIENT
Start: 2025-01-06 | End: 2025-01-10

## 2025-01-06 RX ORDER — NICOTINE POLACRILEX 4 MG
15 LOZENGE BUCCAL
Status: DISCONTINUED | OUTPATIENT
Start: 2025-01-06 | End: 2025-01-10

## 2025-01-06 RX ORDER — DEXMEDETOMIDINE HYDROCHLORIDE 4 UG/ML
INJECTION, SOLUTION INTRAVENOUS CONTINUOUS PRN
Status: DISCONTINUED | OUTPATIENT
Start: 2025-01-06 | End: 2025-01-06

## 2025-01-06 RX ORDER — DOBUTAMINE HYDROCHLORIDE 200 MG/100ML
INJECTION INTRAVENOUS CONTINUOUS PRN
Status: DISCONTINUED | OUTPATIENT
Start: 2025-01-06 | End: 2025-01-06 | Stop reason: HOSPADM

## 2025-01-06 RX ORDER — DEXTROSE MONOHYDRATE AND SODIUM CHLORIDE 5; .45 G/100ML; G/100ML
INJECTION, SOLUTION INTRAVENOUS CONTINUOUS
Status: DISCONTINUED | OUTPATIENT
Start: 2025-01-06 | End: 2025-01-07

## 2025-01-06 RX ORDER — MIDAZOLAM HYDROCHLORIDE 1 MG/ML
1 INJECTION INTRAMUSCULAR; INTRAVENOUS EVERY 5 MIN PRN
Status: DISCONTINUED | OUTPATIENT
Start: 2025-01-06 | End: 2025-01-08

## 2025-01-06 RX ORDER — MIDAZOLAM HYDROCHLORIDE 1 MG/ML
INJECTION INTRAMUSCULAR; INTRAVENOUS AS NEEDED
Status: DISCONTINUED | OUTPATIENT
Start: 2025-01-06 | End: 2025-01-06

## 2025-01-06 RX ORDER — METOPROLOL TARTRATE 1 MG/ML
INJECTION, SOLUTION INTRAVENOUS AS NEEDED
Status: DISCONTINUED | OUTPATIENT
Start: 2025-01-06 | End: 2025-01-06 | Stop reason: HOSPADM

## 2025-01-06 RX ORDER — IPRATROPIUM BROMIDE AND ALBUTEROL SULFATE 2.5; .5 MG/3ML; MG/3ML
3 SOLUTION RESPIRATORY (INHALATION) EVERY 4 HOURS
Status: DISCONTINUED | OUTPATIENT
Start: 2025-01-06 | End: 2025-01-06

## 2025-01-06 RX ORDER — MAGNESIUM SULFATE 1 G/100ML
1 INJECTION INTRAVENOUS AS NEEDED
Status: DISCONTINUED | OUTPATIENT
Start: 2025-01-06 | End: 2025-01-07

## 2025-01-06 RX ORDER — ROSUVASTATIN CALCIUM 10 MG/1
10 TABLET, COATED ORAL NIGHTLY
Status: DISCONTINUED | OUTPATIENT
Start: 2025-01-06 | End: 2025-01-10

## 2025-01-06 RX ORDER — DOBUTAMINE HYDROCHLORIDE 200 MG/100ML
INJECTION INTRAVENOUS CONTINUOUS PRN
Status: DISCONTINUED | OUTPATIENT
Start: 2025-01-06 | End: 2025-01-08

## 2025-01-06 RX ORDER — METOPROLOL TARTRATE 25 MG/1
TABLET, FILM COATED ORAL
Status: COMPLETED
Start: 2025-01-06 | End: 2025-01-06

## 2025-01-06 RX ORDER — ONDANSETRON 2 MG/ML
4 INJECTION INTRAMUSCULAR; INTRAVENOUS EVERY 6 HOURS PRN
Status: DISCONTINUED | OUTPATIENT
Start: 2025-01-06 | End: 2025-01-06

## 2025-01-06 RX ORDER — ROCURONIUM BROMIDE 10 MG/ML
INJECTION, SOLUTION INTRAVENOUS AS NEEDED
Status: DISCONTINUED | OUTPATIENT
Start: 2025-01-06 | End: 2025-01-06 | Stop reason: SURG

## 2025-01-06 RX ORDER — DOCUSATE SODIUM 100 MG/1
100 CAPSULE, LIQUID FILLED ORAL 2 TIMES DAILY
Status: DISCONTINUED | OUTPATIENT
Start: 2025-01-07 | End: 2025-01-10

## 2025-01-06 RX ORDER — ACETAMINOPHEN 10 MG/ML
1000 INJECTION, SOLUTION INTRAVENOUS EVERY 6 HOURS
Status: COMPLETED | OUTPATIENT
Start: 2025-01-06 | End: 2025-01-07

## 2025-01-06 RX ORDER — AMIODARONE HYDROCHLORIDE 150 MG/3ML
INJECTION, SOLUTION INTRAVENOUS AS NEEDED
Status: DISCONTINUED | OUTPATIENT
Start: 2025-01-06 | End: 2025-01-06 | Stop reason: HOSPADM

## 2025-01-06 RX ORDER — SODIUM CHLORIDE 9 MG/ML
INJECTION, SOLUTION INTRAVENOUS CONTINUOUS
Status: DISCONTINUED | OUTPATIENT
Start: 2025-01-06 | End: 2025-01-08

## 2025-01-06 RX ORDER — MORPHINE SULFATE 4 MG/ML
4 INJECTION, SOLUTION INTRAMUSCULAR; INTRAVENOUS EVERY 2 HOUR PRN
Status: DISCONTINUED | OUTPATIENT
Start: 2025-01-06 | End: 2025-01-10

## 2025-01-06 RX ORDER — DEXMEDETOMIDINE HYDROCHLORIDE 4 UG/ML
INJECTION, SOLUTION INTRAVENOUS CONTINUOUS
Status: DISCONTINUED | OUTPATIENT
Start: 2025-01-06 | End: 2025-01-07

## 2025-01-06 RX ORDER — NITROGLYCERIN 20 MG/100ML
INJECTION INTRAVENOUS CONTINUOUS PRN
Status: DISCONTINUED | OUTPATIENT
Start: 2025-01-06 | End: 2025-01-08

## 2025-01-06 RX ADMIN — HEPARIN SODIUM 30000 UNITS: 1000 INJECTION, SOLUTION INTRAVENOUS; SUBCUTANEOUS at 07:58:00

## 2025-01-06 RX ADMIN — LIDOCAINE HYDROCHLORIDE 50 MG: 10 INJECTION, SOLUTION EPIDURAL; INFILTRATION; INTRACAUDAL; PERINEURAL at 06:49:00

## 2025-01-06 RX ADMIN — DEXMEDETOMIDINE HYDROCHLORIDE 0.5 MCG/KG/HR: 4 INJECTION, SOLUTION INTRAVENOUS at 07:21:00

## 2025-01-06 RX ADMIN — ROCURONIUM BROMIDE 50 MG: 10 INJECTION, SOLUTION INTRAVENOUS at 09:52:00

## 2025-01-06 RX ADMIN — AMIODARONE HYDROCHLORIDE 150 MG: 150 INJECTION, SOLUTION INTRAVENOUS at 10:21:00

## 2025-01-06 RX ADMIN — MIDAZOLAM HYDROCHLORIDE 2 MG: 1 INJECTION INTRAMUSCULAR; INTRAVENOUS at 06:48:00

## 2025-01-06 RX ADMIN — ROCURONIUM BROMIDE 50 MG: 10 INJECTION, SOLUTION INTRAVENOUS at 08:12:00

## 2025-01-06 RX ADMIN — DOBUTAMINE HYDROCHLORIDE 5 MCG/KG/MIN: 200 INJECTION INTRAVENOUS at 10:03:00

## 2025-01-06 RX ADMIN — MIDAZOLAM HYDROCHLORIDE 2 MG: 1 INJECTION INTRAMUSCULAR; INTRAVENOUS at 08:12:00

## 2025-01-06 RX ADMIN — METOPROLOL TARTRATE 2.5 MG: 1 INJECTION, SOLUTION INTRAVENOUS at 07:42:00

## 2025-01-06 RX ADMIN — PROTAMINE SULFATE 250 MG: 10 INJECTION, SOLUTION INTRAVENOUS at 10:37:00

## 2025-01-06 RX ADMIN — ROCURONIUM BROMIDE 100 MG: 10 INJECTION, SOLUTION INTRAVENOUS at 06:49:00

## 2025-01-06 NOTE — ANESTHESIA PROCEDURE NOTES
Arterial Line    Performed by: Robin Patel MD  Authorized by: Robin Patel MD    General Information and Staff    Procedure Start:   Anesthesiologist:  Robin Patel MD  Performed By:  Anesthesiologist  Patient Location:  OR  Indication: continuous blood pressure monitoring and blood sampling needed    Site Identification: real time ultrasound guided    Preanesthetic Checklist: 2 patient identifiers, IV checked, risks and benefits discussed, monitors and equipment checked, pre-op evaluation, timeout performed, anesthesia consent and sterile technique used    Procedure Details    Catheter Size:  20 G  Catheter Length:  1 and 3/4 inch  Catheter Type:  Arrow  Seldinger Technique?: Yes    Laterality:  Left  Site:  Radial artery  Site Prep: chlorhexidine    Line Secured:  Tape and Tegaderm    Assessment    Events: patient tolerated procedure well with no complications      Medications      Additional Comments

## 2025-01-06 NOTE — CONSULTS
[facility]  CONSULT     Lopez Miranda Patient Status:  Inpatient    1955 MRN [id number]   Location [facility] 6NE-A Attending Howie Diaz MD   Hosp Day # 0 PCP Maria Elena Serrano MD     Reason for consult: hyperglycemia    Requested by: Dr. Diaz    Subjective:   History of Present Illness:     Lopez Miranda is a 69 year old male with medical history of atrial fibrillation, aortic stenosis, CAD, hyperthyroidism, hypertension and hyperlipidemia who was admitted after he had Aortic valve replacement with CABG, encompass MAZE, AICHA procedure today. He is currently intubated and sedated and unable to provide any history, family is present at bedside    History/Other:    Past Medical History:  Past Medical History:    A-fib (HCC)    Aortic stenosis    Arrhythmia    Coronary artery disease involving native coronary artery of native heart without angina pectoris    Coronary atherosclerosis    Disorder of thyroid    Essential hypertension    High blood pressure    High cholesterol    Hyperlipidemia     Past Surgical History:   Past Surgical History:   Procedure Laterality Date    Colonoscopy      Other      right shoulder       Family History:   History reviewed. No pertinent family history.  Social History:    reports that he has never smoked. He has never used smokeless tobacco. He reports current alcohol use. He reports that he does not use drugs.     Allergies: Allergies[1]    Medications:  Medications Ordered Prior to Encounter[2]    Review of Systems:   A comprehensive review of systems was completed.    Pertinent positives and negatives noted in the HPI.    Objective:   Physical Exam:    /75   Pulse 84   Temp (!) 94.7 °F (34.8 °C)   Resp 17   Ht 5' 11\" (1.803 m)   Wt 211 lb 10.3 oz (96 kg)   SpO2 100%   BMI 29.52 kg/m²   General: intubated and sedated  Respiratory: No rhonchi, no wheezes  Cardiovascular: S1, S2. Regular rate and rhythm  Abdomen: Soft, NT/ND, +BS  Neuro: No  new focal deficits  Extremities: No edema      Results:    Labs:      Labs Last 24 Hours:  Recent Labs   Lab 01/06/25  1059 01/06/25  1208   WBC  --  7.9   HGB  --  10.4*   MCV  --  86.0   PLT 76.0* 101.0*   INR 1.46* 1.34*       Recent Labs   Lab 01/06/25  1208   *   BUN 11   CREATSERUM 0.79   CA 9.8      K 4.7      CO2 26.0       Recent Labs   Lab 01/06/25  1059 01/06/25  1208   PTP 17.9* 16.7*   INR 1.46* 1.34*       No results for input(s): \"TROP\", \"CK\" in the last 168 hours.      Imaging: Imaging data reviewed in Epic.    Assessment & Plan:      #Stress hyperglycemia  -not diabetic  -Hba1c 5.1  -insulin gtt per protocol  -transition to subcutaneous insulin once extubated and eating    #Aortic stenosis  #CAD  -s/p AVR,CABG, MAZE and AICHA 1/6/2025  -per CV Sx and cardiology    #Hyperthyroidism  -resume methimazole    #Hyperlipidemia  -statin      Plan of care discussed with ROBI Argueta MD  1/6/2025    The 21st Century Cures Act makes medical notes like these available to patients in the interest of transparency. Please be advised this is a medical document. Medical documents are intended to carry relevant information, facts as evident, and the clinical opinion of the practitioner. The medical note is intended as peer to peer communication and may appear blunt or direct. It is written in medical language and may contain abbreviations or verbiage that are unfamiliar.            [1] No Known Allergies  [2]   No current facility-administered medications on file prior to encounter.     Current Outpatient Medications on File Prior to Encounter   Medication Sig Dispense Refill    Multiple Vitamin (MULTI-VITAMIN) Oral Tab 1 tablet daily.      apixaban 5 MG Oral Tab Take 1 tablet (5 mg total) by mouth 2 (two) times daily.      enalapril 5 MG Oral Tab Take 1 tablet (5 mg total) by mouth daily. 30 tablet 0    OMEPRAZOLE 20 MG Oral Capsule Delayed Release TAKE 1 CAPSULE BY MOUTH EVERY DAY (Patient  taking differently: Take 1 capsule (20 mg total) by mouth every morning.) 90 capsule 0    Rosuvastatin Calcium 10 MG Oral Tab Take 1 tablet (10 mg total) by mouth nightly.      aspirin 81 MG Oral Chew Tab Chew 1 tablet (81 mg total) by mouth in the morning and 1 tablet (81 mg total) before bedtime.

## 2025-01-06 NOTE — PLAN OF CARE
Received patient from CV[facility] at 1200. Patient intubated, sedated on precedex and propofol gtts. 2 units FFP transfused from OR. Monitor shows sinus rhythm with 1st degree AV block and freq pvcs. Titrating dobs and nitro gtts to maintain SBP < 140. Venticular pacing wires connected to pacemaker. Insulin gtt algorithm A. See I/Os for chest tube and chapin output. Pca in use.     Sedation weaned off. Patient able to move all extremities and follow commands. SBT completed, ABG paged to Dr. Patel, orders to extubate. Patient extubated at 1600, transitioned to nasal cannula, tolerated well. Family at bedside, updated on plan of care.

## 2025-01-06 NOTE — RESPIRATORY THERAPY NOTE
Weaning parameter  PS : 5   PEEP : +5  FiO2 : 50%    RSBI :12  NIF- : -15  VC : 1128    Pt is extubated at 1600 without any complication and put pt on 5 L NC.  Will continuous to monitor.

## 2025-01-06 NOTE — CONSULTS
[facility]  Report of Consultation    Lopez Miranda Patient Status:  Inpatient    1955 MRN [id number]   Location [facility] 6NE-A Attending Howie Diaz MD   Hosp Day # 0 PCP Maria Elena Serrano MD     Reason for Consultation:  Cardiac management s/p avr 23 mm Inspiris bioprosthesis, cabg x 1 (svg to om), maze,     History of Present Illness:  Lopez Miranda is a a(n) 69 year old male followed by Dr Ahn and Dr Warren hx of severe bicuspid aortic valve stenosis with preserved LV function, cad with significant disease in an OM branch, paf s/p successful ablation, hypertension, hypercholesterolemia who presented electively today for aortic valve replacement and single vessel cabg along with surgical maze and AICHA removal.     History:  Past Medical History:    A-fib (HCC)    Aortic stenosis    Arrhythmia    Coronary artery disease involving native coronary artery of native heart without angina pectoris    Coronary atherosclerosis    Disorder of thyroid    Essential hypertension    High blood pressure    High cholesterol    Hyperlipidemia     Past Surgical History:   Procedure Laterality Date    Colonoscopy      Other      right shoulder      History reviewed. No pertinent family history.   reports that he has never smoked. He has never used smokeless tobacco. He reports current alcohol use. He reports that he does not use drugs.    Allergies:  Allergies[1]    Medications:    Current Facility-Administered Medications:     midazolam (Versed) 2 MG/2ML injection 1 mg, 1 mg, Intravenous, Q5 Min PRN    dexmedeTOMIDine in sodium chloride 0.9% (Precedex) 400 mcg/100mL infusion premix, 0.2-1.5 mcg/kg/hr, Intravenous, Continuous    ipratropium-albuterol (Duoneb) 0.5-2.5 (3) MG/3ML inhalation solution 3 mL, 3 mL, Nebulization, Q4H    chlorhexidine gluconate (Peridex) 0.12 % oral solution 15 mL, 15 mL, Mouth/Throat, BID@0800,    [START ON 2025] aspirin chewable tab 81 mg, 81 mg, Oral,  BID    rosuvastatin (Crestor) tab 10 mg, 10 mg, Oral, Nightly    [START ON 1/7/2025] methIMAzole (Tapazole) tab 10 mg, 10 mg, Oral, QAM **AND** methIMAzole (Tapazole) tab 15 mg, 15 mg, Oral, QPM    sodium chloride 0.9% infusion, , Intravenous, Continuous    acetaminophen (Ofirmev) 10 mg/mL infusion premix 1,000 mg, 1,000 mg, Intravenous, Q6H    melatonin tab 3 mg, 3 mg, Oral, Nightly PRN    [START ON 1/7/2025] sennosides (Senokot) tab 8.6 mg, 8.6 mg, Oral, BID    [START ON 1/7/2025] docusate sodium (Colace) cap 100 mg, 100 mg, Oral, BID    polyethylene glycol (PEG 3350) (Miralax) 17 g oral packet 17 g, 17 g, Oral, Daily PRN    bisacodyl (Dulcolax) 10 MG rectal suppository 10 mg, 10 mg, Rectal, Daily PRN    vancomycin (Vancocin) 1.5 g in sodium chloride 0.9% 250mL IVPB premix, 15 mg/kg, Intravenous, Q12H    albumin human (Albumin) 5% injection 12.5 g, 12.5 g, Intravenous, Once PRN    DOBUTamine in dextrose 5% (Dobutrex) 500 mg/250mL infusion premix, 2.5-20 mcg/kg/min (Dosing Weight), Intravenous, Continuous PRN    nitroGLYCERIN in dextrose 5% 50 mg/250mL infusion premix, 5-300 mcg/min, Intravenous, Continuous PRN    norepinephrine (Levophed) 4 mg/250mL infusion premix, 0.5-30 mcg/min, Intravenous, Continuous PRN    NitroPRUSSide (Nipride) 50 mg in dextrose 5% 250 mL infusion, 0.1-4 mcg/kg/min (Dosing Weight), Intravenous, Continuous PRN    [START ON 1/7/2025] metoprolol tartrate (Lopressor) partial tab 12.5 mg, 12.5 mg, Oral, 2x Daily(Beta Blocker)    potassium chloride 20 mEq/100mL IVPB premix 20 mEq, 20 mEq, Intravenous, PRN **OR** potassium chloride 40 mEq/100mL IVPB premix (central line) 40 mEq, 40 mEq, Intravenous, PRN    calcium gluconate 3 g in sodium chloride 0.9% 100 mL IVPB, 3 g, Intravenous, PRN    magnesium sulfate in dextrose 5% 1 g/100mL infusion premix 1 g, 1 g, Intravenous, PRN    magnesium sulfate in sterile water for injection 2 g/50mL IVPB premix 2 g, 2 g, Intravenous, PRN    mupirocin (Bactroban)  2% nasal ointment 1 Application, 1 Application, Nasal, BID    dextrose 5%-sodium chloride 0.45% infusion,  mL/hr, Intravenous, Continuous    morphINE PF 2 MG/ML injection 2 mg, 2 mg, Intravenous, Q2H PRN **OR** morphINE PF 4 MG/ML injection 4 mg, 4 mg, Intravenous, Q2H PRN    pantoprazole (Protonix) 40 mg in sodium chloride 0.9% PF 10 mL IV push, 40 mg, Intravenous, QAM AC **OR** pantoprazole (Protonix) DR tab 40 mg, 40 mg, Oral, QAM AC    ceFAZolin (Ancef) 2g in 10mL IV syringe premix, 2 g, Intravenous, Q8H    sodium chloride 0.9% infusion, , Intravenous, Continuous    HYDROmorphone in sodium chloride 0.9% (Dilaudid) 20 mg/100mL PCA premix, , Intravenous, Continuous    HYDROmorphone 0.4 mg BOLUS FROM PCA, 0.4 mg, Intravenous, Q30 Min PRN    naloxone (Narcan) 0.4 MG/ML injection 0.08 mg, 0.08 mg, Intravenous, Q5 Min PRN    diphenhydrAMINE (Benadryl) 50 mg/mL  injection 12.5 mg, 12.5 mg, Intravenous, Q4H PRN    ondansetron (Zofran) 4 MG/2ML injection 4 mg, 4 mg, Intravenous, Q6H PRN    glucose (Dex4) 15 GM/59ML oral liquid 15 g, 15 g, Oral, Q15 Min PRN **OR** glucose (Glutose) 40% oral gel 15 g, 15 g, Oral, Q15 Min PRN **OR** glucose-vitamin C (Dex-4) chewable tab 4 tablet, 4 tablet, Oral, Q15 Min PRN **OR** dextrose 50% injection 50 mL, 50 mL, Intravenous, Q15 Min PRN **OR** glucose (Dex4) 15 GM/59ML oral liquid 30 g, 30 g, Oral, Q15 Min PRN **OR** glucose (Glutose) 40% oral gel 30 g, 30 g, Oral, Q15 Min PRN **OR** glucose-vitamin C (Dex-4) chewable tab 8 tablet, 8 tablet, Oral, Q15 Min PRN    insulin regular human (Novolin R, Humulin R) 100 Units in sodium chloride 0.9% 100 mL standard infusion (100 mL), 1-57 Units/hr, Intravenous, Continuous    Facility-Administered Medications Ordered in Other Encounters:     heparin (Porcine) 1000 UNIT/ML injection, , Intravenous, PRN    vancomycin (Vancocin) 1 g in sodium chloride 0.9% 250 mL IVPB-ADDV, , Intravenous, Continuous PRN    rocuronium (Zemuron) 50 mg/5mL  injection, , Intravenous, PRN    lidocaine PF (Xylocaine-MPF) 1% injection, , Intravenous, PRN    fentaNYL (Sublimaze) 50 mcg/mL injection, , Intravenous, PRN    aminocaproic acid (Amicar) 10 g in sodium chloride 0.9% 250 mL infusion, , Intravenous, Continuous PRN    aminocaproic acid BOLUS FROM BAG infusion, , Intravenous, PRN    amiodarone (Cordarone) 150 mg in dextrose 5% 100 mL IV bolus, , Intravenous, Continuous PRN    amiodarone (Cordarone) 150 MG/3ML injection, , Intravenous, PRN    amiodarone (Cordarone) 450 mg in dextrose 5% 250 mL infusion, , Intravenous, Continuous PRN    protamine 10 mg/mL injection, , Intravenous, PRN    Review of Systems:  All systems were reviewed and are negative except as described above in HPI.    Physical Exam:  Blood pressure 156/87, pulse 79, temperature 98.5 °F (36.9 °C), temperature source Temporal, resp. rate 19, height 5' 11\" (1.803 m), weight 211 lb 10.3 oz (96 kg), SpO2 97%.  Temp (24hrs), Av.5 °F (36.9 °C), Min:98.5 °F (36.9 °C), Max:98.5 °F (36.9 °C)    Wt Readings from Last 3 Encounters:   25 211 lb 10.3 oz (96 kg)   24 210 lb (95.3 kg)   24 216 lb 8 oz (98.2 kg)       Telemetry: sr with first degree avb  General: intubated, sedated   HEENT: ETT  Neck: No JVD, RIJ swan  Cardiac: Regular rate and rhythm, S1, S2 normal + rub   Lungs: clear anteriorly on vent  Abdomen: Soft  Extremities: Without clubbing, cyanosis or edema.  Peripheral pulses are 2+.  Neurologic: sedated  Skin: Warm and dry.     Laboratories and Data:  Diagnostics:  EKG: sr with first degree avb, pvcs, rbbb    Echo: 24  1. Left ventricle: The cavity size was normal. Wall thickness was normal.      Systolic function was normal. The estimated ejection fraction was 65-70%,      by visual assessment. No diagnostic evidence for regional wall motion      abnormalities. Left ventricular diastolic function parameters were normal      for the patient's age.   2. Aortic valve: A bicuspid  morphology cannot be excluded. The leaflets were      mildly calcified. Transvalvular velocity was increased, due to stenosis.      The findings were consistent with severe stenosis. There was mild      regurgitation. The peak systolic velocity was 4.25m/sec. The mean      systolic gradient was 45mm Hg. The valve area (VTI) was 1cm^2. The valve      area (VTI) index was 0.47cm^2/m^2.   Impressions:  This study is compared with previous dated 1/3/2020: Aortic   stenosis progressed to severe.     Cath: 12/2/24  1.     Significant coronary artery disease involving an obtuse marginal branch.  2.     Nonobstructive LAD disease.    CXR: pending     Labs:   Lab Results   Component Value Date    PLT 76.0 01/06/2025     Lab Results   Component Value Date    INR 1.46 (H) 01/06/2025    INR 1.19 12/30/2024    INR 0.97 01/12/2017       Assessment/Plan:  Patient Active Problem List   Diagnosis    Asymptomatic bilateral carotid artery stenosis    Coronary artery disease involving coronary bypass graft of native heart without angina pectoris    Family history of early CAD    Nonrheumatic aortic valve stenosis    Pure hypercholesterolemia    Elevated alkaline phosphatase level    Thrombocytopenia (HCC)    Hyperthyroidism    Community acquired pneumonia of right upper lobe of lung    Hyponatremia    BACON (dyspnea on exertion)    Paroxysmal atrial fibrillation (HCC)    Precordial pain       Severe bicuspid aortic valve stenosis, cad s/p avr 23 mm inspiris bioprosthesis, cabg x 1 (svg to om), surgical maze and removal of AICHA- pod 0- ef 65%  Hemodynamically stable on dobutamine 5 mcg/kg/min.  CO/CI 5.3/2.5  PAP 25/18  CVP 12    EKG sinus with 1st degree av and rbbb, no acute ischemic changes  Good uop- cr 0.79     ct op as expected. Hgb 10.4, plts 101k  Cxr pending   Hx paf - currently in sr. Was on doac pta    Plan:    Routine post op ccu recovery  Wean gtts as able   Amio per cvs  Wean to extubation later today pending clinical  course  Will review cxr when available    BRUCE Gonsales  1/6/2025  11:59 AM    Patient seen and examined    Patient is post aortic valve replacement and single-vessel bypass/maze procedure.  He looks well on low-dose pressors.  EKG with nonspecific findings and no clear injury pattern.  Will continue warming and extubate as tolerated.  Current PAP's and cardiac index are acceptable.    Discussed with family at bedside    Juan Francisco Ahn MD FACC  C5       [1] No Known Allergies

## 2025-01-06 NOTE — CM/SW NOTE
Pt post op from aortic valve replacement/tissue valve with coronary artery bypass x 1; encompass maze; left atrial appendage amputation.     Home health referrals sent for services following admission. PT eval ordered, will follow for anticipated therapy needs.     CM/SW will remain available for DC planning and/or support.     VIDA OlivarezN, CMSRN    [id number]

## 2025-01-06 NOTE — ANESTHESIA PROCEDURE NOTES
Procedure Performed: ROBIN       Start Time:        End Time:      Preanesthesia Checklist:  Patient identified, IV assessed, risks and benefits discussed, monitors and equipment assessed, procedure being performed at surgeon's request and anesthesia consent obtained.    General Procedure Information  Diagnostic Indications for Echo:  assessment of ascending aorta  Location performed:  OR  Intubated  Bite block placed  Heart visualized  Probe Insertion:  Easy  Probe Type:  Multiplane  Modalities:  Color flow mapping, pulse wave Doppler, continuous wave Doppler and 3D    Echocardiographic and Doppler Measurements    Ventricles    Right Ventricle:  Cavity size normal.  Hypertrophy present.    Left Ventricle:  Cavity size normal.  Hypertrophy present.  Thrombus not present.  Ejection Fraction 60%.      Ventricular Regional Function:  1- Basal Anteroseptal:  normal  2- Basal Anterior:  normal  3- Basal Anterolateral:  normal  4- Basal Inferolateral:  normal  5- Basal Inferior:  normal  6- Basal Inferoseptal:  normal  7- Mid Anteroseptal:  normal  8- Mid Anterior:  normal  9- Mid Anterolateral:  normal  10- Mid Inferolateral:  normal  11- Mid Inferior:  normal  12- Mid Inferoseptal:  normal  13- Apical Anterior:  normal  14- Apical Lateral:  normal  15- Apical Inferior:  normal  16- Apical Septal:  normal  17- [place]:  normal      Valves    Aortic Valve:  Annulus calcified.  Stenosis severe.  Mean Gradient: 106 mmHg.  Regurgitation +3.  Leaflets calcified and thickened.  Leaflet motions restricted.      Mitral Valve:  Annulus dilated.  Stenosis not present.  Regurgitation +2.  Leaflets normal.  Leaflet motions normal.      Tricuspid Valve:  Annulus normal.  Regurgitation +1.  Leaflets normal.  Leaflet motions normal.    Pulmonic Valve:  Annulus normal.  Stenosis not present.        Aorta    Ascending Aorta:  Size normal.  Dissection not present.  Plaque thickness greater than 3 mm.  Mobile plaque not present.    Descending  Aorta:  Size normal.  Dissection not present.  Mobile plaque not present.        Atria    Right Atrium:  Size normal.  Spontaneous echo contrast not present.  Thrombus not present.  Tumor not present.  Device present.    Left Atrium:  Size normal.  Spontaneous echo contrast not present.  Thrombus not present.  Tumor not present.  Device not present.    Left atrial appendage normal.      Septa    Atrial Septum:  Intra-atrial septal morphology normal.      Ventricular Septum:  Intra-ventricular septum morphology normal.          Other Findings  Pericardium:  normal  Pleural Effusion:  none  Pulmonary Arteries:  normal      Anesthesia Information  Performed Personally  Anesthesiologist:  Robin Patel MD      Post                     Comments: 3D imaging acquired and interpreted.    Complications:None

## 2025-01-06 NOTE — OPERATIVE REPORT
[facility]    PATIENT'S NAME: LUIS FERNANDO MCMAHON   ATTENDING PHYSICIAN: Howie Diaz MD   OPERATING PHYSICIAN: Howie Diaz MD   PATIENT ACCOUNT#:   [id number]    LOCATION:  [address] [place] [address]  MEDICAL RECORD #:   [id number]       YOB: 1955  ADMISSION DATE:       01/06/2025      OPERATION DATE:  01/06/2025    OPERATIVE REPORT    PREOPERATIVE DIAGNOSIS:  Aortic stenosis, aortic insufficiency, coronary artery disease, history of atrial fibrillation.  POSTOPERATIVE DIAGNOSIS:  Aortic stenosis, aortic insufficiency, coronary artery disease, history of atrial fibrillation.  PROCEDURE:    1. Aortic valve replacement with 23 mm Inspiris bovine pericardial valve.  2. Single saphenous vein graft to the obtuse marginal.  3. EnCompass radiofrequency maze.  4. Amputation of left atrial appendage.    ASSISTANT:  Gagan George PA-C.    ANESTHESIA:  General endotracheal.    BLOOD LOSS:  600 mL.    COMPLICATIONS:  None.    INDICATIONS:  This patient is a 69-year-old gentleman with diagnosed bicuspid valve, moderately severe aortic stenosis, moderately severe aortic insufficiency, coronary artery disease, history of atrial fibrillation with 2 prior ablations.  Risks, benefits, and options of surgical correction versus other treatment modalities were discussed.      OPERATIVE TECHNIQUE:  Appropriate lines and catheters were placed.  General anesthesia was induced.  Burbank-Ryder was placed.  Transesophageal echo was placed.  Transcranial oximetry was placed.  Common femoral arterial line was placed.  Patient prepped and draped.  Sternotomy was performed.  Pericardium was opened.  The aorta was size-appropriate for the patient's 2.2 m2 body surface area.  Segment of saphenous graft was harvested from the left thigh.  The patient was heparinized and cannulated for bypass.  On bypass, patient was cooled to 26 degrees centigrade.  The aorta was crossclamped.  Blood cardioplegia was infused to achieve  electromechanical arrest of the heart.  The EnCompass bipolar radiofrequency maze was then accomplished in the usual fashion.  Left atrial appendage was then amputated.  There was no clot present.  This was oversewn with 2 layers of 3-0 Prolene suture.  The obtuse marginal coronary was then bypassed, which was a 1.5 mm artery of good quality.  A segment of saphenous graft was placed here.  Cardioplegia was given.  Retrograde cannula was placed.  Vent was placed via the right superior pulmonary vein.   Aorta was opened through a transverse aortotomy.  The valve was inspected and found to be horrifically calcified.  Both the valve and the annulus together were extremely calcified.  Decalcification of the valve and annulus took approximately 20 minutes, which is unusually long, with care being taken to remove all calcific debris from the operative field.  The area was sized and found to accommodate a 23 mm valve, which was prepared while valve sutures were placed circumferentially with the pledgets on the ventricular side.  Sutures were then passed through the sewing ring of the 23 mm valve which was lowered in place and secured using manual knots as well as CorKnots.  After seating the valve, I could easily give cardioplegia directly via the right and left main coronary ostia.  Rewarming was begun while the aortotomy was closed.  Prior to complete closure, the heart was manually de-aired.  While continuing to rewarm, proximal anastomosis for the obtuse graft was constructed end-to-side to the aorta.  Warm cardioplegia was given.  The aorta was unclamped.  Following complete and thorough rewarming and de-airing and resumption of sinus rhythm, patient was weaned from cardiopulmonary bypass with low-dose dobutamine without any difficulty.  Pump time 146 minutes.  Crossclamp 117 minutes.  Cardioplegia 3.3 L.  Patient was decannulated.  Protamine was administered.  Pacing leads were applied to the heart.  Chest was closed  with double-stranded wire supplemented with two 8-hole titanium plates for further security.  Subcutaneous tissue and skin were closed.  As the patient's platelet count was only 70,000 at the completion of the operation, a unit of platelets was given.  Bleeding promptly slowed to an acceptable level.  Patient was transferred to the ICU in stable condition.      The patient's family was updated by me regarding the patient's condition and the conduct of the operation.    Dictated By Howie Diaz MD  d: 01/06/2025 12:29:13  t: 01/06/2025 13:04:28  Job [id number]  BF/

## 2025-01-06 NOTE — ANESTHESIA POSTPROCEDURE EVALUATION
[facility]    Lopez Miranda Patient Status:  Inpatient   Age/Gender 69 year old male MRN [id number]   Location [facility] 6NE-A Attending Howie Diaz MD   Hosp Day # 0 PCP Maria Elena Serrano MD       Anesthesia Post-op Note    AORTIC VALVE REPLACEMENT/TISSUE VALVE WITH CORONARY ARTERY BYPASS X1;  ENCOMPASS MAZE; LEFT ATRIAL APPENDAGE AMPUTATION    Procedure Summary       Date: 01/06/25 Room / Location:  CV[facility] 02 /  CVOR    Anesthesia Start: 0647 Anesthesia Stop: 1203    Procedure: AORTIC VALVE REPLACEMENT/TISSUE VALVE WITH CORONARY ARTERY BYPASS X1;  ENCOMPASS MAZE; LEFT ATRIAL APPENDAGE AMPUTATION (Chest) Diagnosis: (same)    Surgeons: Howie Diaz MD Anesthesiologist: Robin Patel MD    Anesthesia Type: general ASA Status: 3            Anesthesia Type: No value filed.    Vitals Value Taken Time   /71 01/06/25 1225   Temp 98 01/06/25 1231   Pulse 91 01/06/25 1230   Resp 22 01/06/25 1230   SpO2 99 % 01/06/25 1230   Vitals shown include unfiled device data.    Patient Location: ICU    Anesthesia Type: general    Airway Patency: patent and intubated    Postop Pain Control: sedated until time of extubation    Mental Status: sedated until time of extubation    Nausea/Vomiting: none    Cardiopulmonary/Hydration status: stable euvolemic    Complications: no apparent anesthesia related complications    Postop vital signs: stable    Comments: Patient was monitored throughout case with Cerebral oximetry, standard monitors as well a monitors noted in procedure list.  Upon completion of the surgery and satisfactory hemostasis the patient was transferred to a CCU bed and a mobile monitor.  The patient was taken intubated with ambu bag and appropriate sedation.  All relevant information about patient, medications, procedure and plan discussed with the nurse as I relinquished care of the patient.        Dental Exam: Unchanged from Preop    Sign out given to ICU staff.

## 2025-01-06 NOTE — HISTORICAL OFFICE NOTE
Facility Logo [facility]  [address], 4th [place] [address]  [phone]      Lopez Miranda  Progress Note  Demographics:  Name: Lopez Miranda YOB: 1955  Age: 69, Male Medical Record No: [id number]  Visited Date/Time: 12/11/2024 09:40 AM    Chief Complaints  s/p LHC 12/2  AVR with Dr. Diaz scheduled 1/6/25  History of Present Illness  Patient is here for follow-up visit.  He has no complaints.  He underwent coronary angiography.  He has had no ill effects from it.  Cardiac risk factors Dyslipidemia and Never smoked  Past Medical History  1.Atrial fibrillation (Afib), paroxysmal - A Fib  2.BACON (dyspnea on exertion)  3.Chest pain, precordial - CP  4.Pure hypercholesterolemia  5.Nonrheumatic aortic valve stenosis  6.Coronary artery disease involving coronary bypass graft of native heart without angina pectoris  7.Hyperthyroidism  8.Thrombocytopenia  9.Elevated alkaline phosphatase level  10.Family history of early CAD -FHx  11.Asymptomatic bilateral carotid artery stenosis  12.Nonrheumatic aortic valve stenosis  13.Pure hypercholesterolemia  14.Chest pain, precordial  15.Family history of early CAD  16.Carotid stenosis, asymptomatic, bilateral  17.Coronary artery disease involving native coronary artery of native heart without angina pectoris  Family History  1. Father - CAD native (coronary artery disease); Acute myocardial infarction, unspecified; Serum lipids high  2. Mother - CAD native (coronary artery disease); Acute myocardial infarction, unspecified; Serum lipids high  3. Brother - CAD native (coronary artery disease); Acute myocardial infarction, unspecified; Serum lipids high  Social History  Smoking status Never smoked  Tobacco usage - No (Non-smoker for personal reasons (finding))  Review of systems  Constitutional No history of Weight Loss  Eyes No history of Blurry vision  ENT No history of Bloody nose (epistaxis)  Gastrointestinal No history of  Abdominal pain  Cardiovascular No history of Chest pain, BACON, Palpitations, Syncope, PND, Orthopnea, Edema and Claudication  Genitourinary No history of Dysuria  Musculoskeletal No history of Myalgias  Respiratory No history of SOB  Neurological No history of Migraines  Endocrine No history of Polyuria  Hem/Lymphatic No history of Easy bruising  Integumentary No history of Rashes  Physical Examination  Vitals Right Arm Sitting  / 78 mmHg, Pulse rate 84 bpm, Height in 6', BMI: 30.1, Weight in 222 lbs (or) 100.7 kgs and BSA : 2.29 cc/m²  General Appearance No Acute Distress  Cardiovascular   EKG/Other abnormalities  HEENT: EOMI, mucosa moist  Lungs: clear  CV: RRR  Abd: soft  Ext: no edema  Neuro: A&O  Allergies  No medication allergies noted.  Medications  1.aspirin 81 mg chewable tablet, Take 1 tablet orally 2 times a day.  2.Eliquis 5 mg tablet, Take 1 tablet orally 2 times a day for 30 days.  3.ENALAPRIL MALEATE 5 MG TABLET, TAKE 1 TABLET BY MOUTH EVERY DAY  4.methIMAzole 10 mg tablet, Take 1 tablet orally in the AM and 1 and 1/2 tablets in the PM  5.multivitamin tablet, Take 1 tablet orally once a day.  6.omeprazole 20 mg capsule,delayed release, Take 1 capsule orally once a day.  7.rosuvastatin 10 mg tablet, Take 1 tablet orally once a day.  Impression  1.Preop testing  2.Aortic stenosis, severe  3.Atrial fibrillation (Afib), paroxysmal - A Fib  4.BACON (dyspnea on exertion)  5.Chest pain, precordial - CP  6.Pure hypercholesterolemia  7.Nonrheumatic aortic valve stenosis  8.Coronary artery disease involving coronary bypass graft of native heart without angina pectoris  9.Hyperthyroidism  10.Thrombocytopenia  11.Elevated alkaline phosphatase level  12.Family history of early CAD -FHx  13.Asymptomatic bilateral carotid artery stenosis  14.Nonrheumatic aortic valve stenosis  15.Pure hypercholesterolemia  16.Chest pain, precordial  17.Family history of early CAD  18.Carotid stenosis, asymptomatic,  bilateral  19.Coronary artery disease involving native coronary artery of native heart without angina pectoris  Assessment & Plan  Patient underwent coronary angiography.  This revealed significant disease in an obtuse marginal branch.  He is going to have aortic valve replacement by Dr. Diaz in January.    We talked about expectations with this.  I have ordered a carotid ultrasound prior to his surgery.    I Will continue his present regimen for now.  I will see him after his valve surgery.  He will call with questions or concerns.  Labs and Diagnostics ordered  1.Carotid Ultrasound (bilateral) (Schedule next available)  Miscellaneous  1.Reviewed nuclear pet, trans thoracic echocardiogram, carotid ultrasound, ambulatory telemetry monitor with the patient.  Nurses documentation  Request refills: none  Upcoming procedures: none  Assistive device use: none  Verbal order for EKG: no    Triage completed by MP/estiven   Patient instructions  Complete a carotid ultrasound   Follow up with Dr. Ahn after aortic valve replacement   Lab Details  BASIC METABOLIC PANEL (8)  11/27/2024 10:23:34 PM  GLUCOSE 88 70-99 mg/dL  F  SODIUM 141 136-145 mmol/L  F  POTASSIUM 4.0 3.5-5.1 mmol/L  F  CHLORIDE 107  mmol/L  F  CO2 26.0 21.0-32.0 mmol/L  F  ANION GAP 8 0-18 mmol/L  F  BUN 13 9-23 mg/dL  F  CREATININE 0.88 0.70-1.30 mg/dL  F  CALCIUM 9.9 8.7-10.4 mg/dL  F  OSMOLALITY CALCULATED 292 275-295 mOsm/kg  F  E GFR CR 94 >=60 mL/min/1.73m2  F  FASTING PATIENT BMP ANSWER Yes   F  CBC WITH DIFFERENTIAL WITH PLATELET  11/27/2024 10:02:34 PM  WBC 6.2 4.0-11.0 x10(3) uL  F  RED BLOOD COUNT 4.32 3.80-5.80 x10(6)uL  F  HGB 12.7 13.0-17.5 g/dL L F  HCT 38.0 39.0-53.0 % L F  PLATELETS 165.0 150.0-450.0 10(3)uL  F  MEAN CELL VOLUME 88.0 80.0-100.0 fL  F  MEAN CORPUSCULAR HEMOGLOBIN 29.4 26.0-34.0 pg  F  MEAN CORPUSCULAR HGB CONC 33.4 31.0-37.0 g/dL  F  RED CELL DISTRIBUTION WIDTH CV 12.4 %  F  NEUTROPHIL ABS PRELIM 3.83 1.50-7.70 x10 (3)  uL  F  NEUTROPHIL ABSOLUTE 3.83 1.50-7.70 x10(3) uL  F  LYMPHOCYTE ABSOLUTE 1.74 1.00-4.00 x10(3) uL  F  MONOCYTE ABSOLUTE 0.45 0.10-1.00 x10(3) uL  F  EOSINOPHIL ABSOLUTE 0.15 0.00-0.70 x10(3) uL  F  BASOPHIL ABSOLUTE 0.05 0.00-0.20 x10(3) uL  F  IMMATURE GRANULOCYTE COUNT 0.02 0.00-1.00 x10(3) uL  F  NEUTROPHIL % 61.4 %  F  LYMPHOCYTE % 27.9 %  F  MONOCYTE % 7.2 %  F  EOSINOPHIL % 2.4 %  F  BASOPHIL % 0.8 %  F  IMMATURE GRANULOCYTE RATIO % 0.3 %  F  CBC W/ DIFFERENTIAL  12/23/2023 04:10:46 PM  WBC 4.7 4.0-11.0 x10(3) uL  F  RED BLOOD COUNT 4.96 3.80-5.80 x10(6)uL  F  HGB 14.4 13.0-17.5 g/dL  F  HCT 42.6 39.0-53.0 %  F  PLATELETS 175.0 150.0-450.0 10(3)uL  F  MEAN CELL VOLUME 85.9 80.0-100.0 fL  F  MEAN CORPUSCULAR HEMOGLOBIN 29.0 26.0-34.0 pg  F  MEAN CORPUSCULAR HGB CONC 33.8 31.0-37.0 g/dL  F  RED CELL DISTRIBUTION WIDTH CV 12.4 %  F  NEUTROPHIL ABS PRELIM 3.07 1.50-7.70 x10 (3) uL  F  NEUTROPHIL ABSOLUTE 3.07 1.50-7.70 x10(3) uL  F  LYMPHOCYTE ABSOLUTE 1.04 1.00-4.00 x10(3) uL  F  MONOCYTE ABSOLUTE 0.42 0.10-1.00 x10(3) uL  F  EOSINOPHIL ABSOLUTE 0.17 0.00-0.70 x10(3) uL  F  BASOPHIL ABSOLUTE 0.02 0.00-0.20 x10(3) uL  F  IMMATURE GRANULOCYTE COUNT 0.01 0.00-1.00 x10(3) uL  F  NEUTROPHIL % 64.9 %  F  LYMPHOCYTE % 22.0 %  F  MONOCYTE % 8.9 %  F  EOSINOPHIL % 3.6 %  F  BASOPHIL % 0.4 %  F  IMMATURE GRANULOCYTE RATIO % 0.2 %  F  POCT CREATININE  12/22/2023 02:01:51 PM  POCT CREATININE 0.80 0.70-1.30 mg/dL  F  E GFR CR 97 >=60 mL/min/1.73m2  F  Diagnostics Details  Nuclear PET 03/17/2022  1.Stress EKG is normal.    1.This is a normal perfusion study, no perfusion defects noted.    2.The left ventricular cavity is noted to be mildly enlarged on the stress studies. The stress left ventricular ejection fraction was calculated to be 51% and left ventricular global function is normal. The rest left ventricular cavity is noted to be normal. The rest left ventricular ejection fraction was calculated to be 48% and rest left  ventricular global function is normal. The LVEF's may be underestimated due to the patient's baseline atrial fibrillation.    Trans Thoracic Echocardiogram 02/18/2022  1.The study quality is average.    2.The left ventricle is normal in size.. Global left ventricular systolic function is normal. The left ventricular ejection fraction is 55-60%.    3.The left atrial diameter is mildly increased.    4.Mitral regurgitation is noted. Mild (1+) mitral regurgitation.    5. There is moderate calcification of the aortic valve. Mild (1+) aortic regurgitation. The trans-aortic mean gradient is 23.0 mmHg consistent with mild to moderate AS, unchanged from previous exam.    6.Mild (1+) tricuspid regurgitation.    7.The pulmonary artery systolic pressure is 29 mmHg.    Carotid Ultrasound 02/18/2022  1.The study quality is average.    2.1-39% stenosis in the mid right internal carotid artery based on Bluth Criteria.    3.1-39% stenosis in the mid left internal carotid artery based on Bluth Criteria.    4.Antegrade right vertebral artery flow.    5.Antegrade left vertebral artery flow.    ACTMonitoring 02/18/2022  1.This is an average quality study.    2.Predominant rhythm is atrial fibrillation.    3.The minimum heart rate recorded was 70 beats / minute. The maximum heart rate is 180 beats / minute. The mean heart rate is 101 beats / minute.    4.No evidence of AV block is noted.    5.No evidence of supraventricular tachycardia is noted.    6.AF Rugby 99%    7.Rare premature ventricular contractions noted.    8.No evidence of ventricular tachycardia is noted.    9.No pauses were noted.    10.Short NSVT episodes noted * No Supraventricular Tachycardia. * No Pauses. * No Third Degree AV Block or Second Degree AV Block Type II. * 2 patient triggered events, 2 had symptoms specified    CPOE Orders carried out by: Lanny CADENA  Care Providers: Juan Francisco Ahn MD, Emilee Perez and Lanny CADENA  Electronically  Authenticated by  Juan Francisco Ahn MD  12/11/2024 04:33:02 PM  Disclaimer: Components of this note were documented using voice recognition system and are subject to errors not corrected at proofreading. Contact the author of this note for any clarifications.

## 2025-01-06 NOTE — ANESTHESIA PROCEDURE NOTES
Central Line    Performed by: Robin Patel MD  Authorized by: Robin Patel MD    General Information and Staff    Procedure Start:   Anesthesiologist:  Robin Patel MD  Performed by:  Anesthesiologist  Patient Location:  OR  Indication: central venous access and CVP monitoring    Site Identification: real time ultrasound guided        Procedure Detail    Patient Position:  Trendelenburg  Laterality:  Right  Site:  Internal jugular  Prep:  Chloraprep  Catheter Size:  9 Fr  Catheter Type:  MAC introducer  Number of Lumens:  Double lumen  Procedure Detail: target vein identified, needle advanced into vein and blood aspirated and guidewire advanced into vein    Seldinger Technique?: Yes    Intravenous Verification: verified by ultrasound and venous blood return    Post Insertion: all ports aspirated, all ports flushed easily, guidewire was removed intact, line was sutured in place and dressing was applied      Assessment    Events: patient tolerated procedure well with no complications      PA Catheter Placement    PA Catheter Placed?: Yes    PA Catheter Type:  Oximetric  PA Catheter Size:  8  Laterality:  Right  Site:  Internal jugular  Placement Confirmation: pressure tracing changes and verified by ROBIN    PA Catheter Depth (cm):  51  Events: patient tolerated procedure well with no complications      Additional Comments

## 2025-01-06 NOTE — ANESTHESIA PROCEDURE NOTES
Airway  Date/Time: 1/6/2025 6:50 AM  Urgency: elective      General Information and Staff    Patient location during procedure: OR  Anesthesiologist: Robin Patel MD  Performed: anesthesiologist   Performed by: Robin Patel MD  Authorized by: Robin Patel MD      Indications and Patient Condition  Indications for airway management: anesthesia  Sedation level: deep  Preoxygenated: yes  Patient position: sniffing  Mask difficulty assessment: 1 - vent by mask    Final Airway Details  Final airway type: endotracheal airway      Successful airway: ETT  Cuffed: yes   Successful intubation technique: direct laryngoscopy  Endotracheal tube insertion site: oral  Blade size: #3  ETT size (mm): 8.5    Cormack-Lehane Classification: grade I - full view of glottis  Placement verified by: capnometry   Measured from: lips  ETT to lips (cm): 23  Number of attempts at approach: 1

## 2025-01-06 NOTE — DISCHARGE INSTRUCTIONS
Your appointment with Denia Elizondo on 2/4 has an address change, we will be in the new building on the hospital campus:  [facility]  [address] [place] [address]    To access the Dr. Diaz discharge instructions video on your home computer:   [email or web address]  Remove steri strips from all incisions on 1/20      [facility]  Phone # [phone]  Fax # [phone]    Sometimes managing your health at home requires assistance.  The [facility] team has recognized your preference to use [facility]: [phone]. A representative from the home health agency will contact you or your family to schedule your first visit.

## 2025-01-06 NOTE — ANESTHESIA PREPROCEDURE EVALUATION
PRE-OP EVALUATION    Patient Name: Lopez Miranda    Admit Diagnosis: aortic stenosis    Pre-op Diagnosis: aortic stenosis    AORTIC VALVE REPLACEMENT/TISSUE VALVE WITH CORONARY ARTERY BYPASS;  ENCOMPASS MAZE; LEFT ATRIAL APPENDAGE; PLATES    Anesthesia Procedure: AORTIC VALVE REPLACEMENT/TISSUE VALVE WITH CORONARY ARTERY BYPASS;  ENCOMPASS MAZE; LEFT ATRIAL APPENDAGE; PLATES    Surgeons and Role:     * Howie Diaz MD - Primary    Pre-op vitals reviewed.  Temp: 98.5 °F (36.9 °C)  Pulse: 79  Resp: 19  BP: 156/87  SpO2: 97 %  Body mass index is 29.52 kg/m².    Current medications reviewed.  Hospital Medications:   mupirocin (Bactroban) 2% nasal ointment 1 Application  1 Application Nasal Once    [START ON 1/7/2025] ceFAZolin (Ancef) 2g in 10mL IV syringe premix  2 g Intravenous On Call    [COMPLETED] mupirocin (Bactroban) 2% nasal ointment        [COMPLETED] metoprolol tartrate (Lopressor) 25 MG tab        insulin regular human (Novolin R, Humulin R) 100 Units in sodium chloride 0.9% 100 mL standard infusion (100 mL)  1-40 Units/hr Intravenous PRN    EPINEPHrine (Adrenalin) 5 mg in sodium chloride 0.9% 250 mL infusion  1-10 mcg/min Intravenous PRN       Outpatient Medications:   Prescriptions Prior to Admission[1]    Allergies: Patient has no known allergies.      Anesthesia Evaluation        Anesthetic Complications           GI/Hepatic/Renal                                 Cardiovascular                  (+) hypertension     (+) CAD        (+) valvular problems/murmurs and AS                       Endo/Other                                  Pulmonary               (+) shortness of breath            Neuro/Psych                              Patient Active Problem List:     Asymptomatic bilateral carotid artery stenosis     Coronary artery disease involving coronary bypass graft of native heart without angina pectoris     Family history of early CAD     Nonrheumatic aortic valve stenosis     Pure  hypercholesterolemia     Elevated alkaline phosphatase level     Thrombocytopenia (HCC)     Hyperthyroidism     Community acquired pneumonia of right upper lobe of lung     Hyponatremia     BACON (dyspnea on exertion)     Paroxysmal atrial fibrillation (HCC)     Precordial pain            Past Surgical History:   Procedure Laterality Date    Colonoscopy      Other      right shoulder      Social History     Socioeconomic History    Marital status: Single   Tobacco Use    Smoking status: Never    Smokeless tobacco: Never   Vaping Use    Vaping status: Never Used   Substance and Sexual Activity    Alcohol use: Yes     Comment: Uses non-alcoholic socially    Drug use: Never   Other Topics Concern    Caffeine Concern Yes     Comment: 2 cups of coffee every morning and sometimes pop during the day.     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  Lab Results   Component Value Date    WBC 6.4 12/30/2024    RBC 4.44 12/30/2024    HGB 13.5 12/30/2024    HCT 38.9 (L) 12/30/2024    MCV 87.6 12/30/2024    MCH 30.4 12/30/2024    MCHC 34.7 12/30/2024    RDW 12.2 12/30/2024    .0 (L) 12/30/2024     Lab Results   Component Value Date     12/30/2024    K 4.1 12/30/2024     12/30/2024    CO2 26.0 12/30/2024    BUN 13 12/30/2024    CREATSERUM 0.88 12/30/2024    GLU 98 12/30/2024    CA 9.5 12/30/2024     Lab Results   Component Value Date    INR 1.19 12/30/2024         Airway      Mallampati: II  Mouth opening: >3 FB  TM distance: > 6 cm  Neck ROM: full Cardiovascular    Cardiovascular exam normal.         Dental    Dentition appears grossly intact         Pulmonary    Pulmonary exam normal.                 Other findings              ASA: 3   Plan: general  NPO status verified and patient meets guidelines.        Comment: We discussed GA w/ETT and postoperative ventilation and CCU admission.  We discussed placement of additional lines including  arterial catheter, additional PIVs, central venous catheter/PAC and intraop ROBIN.  Patient understands extubation will occur once the overall hemodynamic status is stable. Discussed rare risk of dental trauma from intubation, sore throat, and postop nausea and vomiting.  Discussed possible use of blood products. We discussed postoperative usage of sedatives, analgesics and anxiolytics.  All questions re: anesthetic management were answered.       Plan/risks discussed with: patient                Present on Admission:  **None**             [1]   Medications Prior to Admission   Medication Sig Dispense Refill Last Dose/Taking    methIMAzole 10 MG Oral Tab Take 1 tablet (10 mg total) by mouth every morning AND 1.5 tablets (15 mg total) every evening. 225 tablet 0 1/5/2025 Evening    Multiple Vitamin (MULTI-VITAMIN) Oral Tab 1 tablet daily.   Past Week    apixaban 5 MG Oral Tab Take 1 tablet (5 mg total) by mouth 2 (two) times daily.   12/31/2024    enalapril 5 MG Oral Tab Take 1 tablet (5 mg total) by mouth daily. 30 tablet 0 1/2/2025    OMEPRAZOLE 20 MG Oral Capsule Delayed Release TAKE 1 CAPSULE BY MOUTH EVERY DAY (Patient taking differently: Take 1 capsule (20 mg total) by mouth every morning.) 90 capsule 0 1/5/2025 Morning    Rosuvastatin Calcium 10 MG Oral Tab Take 1 tablet (10 mg total) by mouth nightly.   1/5/2025 Evening    aspirin 81 MG Oral Chew Tab Chew 1 tablet (81 mg total) by mouth in the morning and 1 tablet (81 mg total) before bedtime.   12/31/2024

## 2025-01-06 NOTE — DIETARY NOTE
Clinical Nutrition     Dietitian consult received per cardiac rehab standing order. Pt to be educated by cardiac rehab staff and encouraged to attend outpatient classes taught by KATHERYN. KATHERYN available PRN.    Deonna Larios RD, LDN, [facility]  Clinical Dietitian  Phone [phone]

## 2025-01-07 ENCOUNTER — APPOINTMENT (OUTPATIENT)
Dept: GENERAL RADIOLOGY | Facility: HOSPITAL | Age: 70
End: 2025-01-07
Attending: THORACIC SURGERY (CARDIOTHORACIC VASCULAR SURGERY)
Payer: MEDICARE

## 2025-01-07 ENCOUNTER — APPOINTMENT (OUTPATIENT)
Dept: GENERAL RADIOLOGY | Facility: HOSPITAL | Age: 70
End: 2025-01-07
Attending: PHYSICIAN ASSISTANT
Payer: MEDICARE

## 2025-01-07 LAB
BASOPHILS # BLD AUTO: 0.01 X10(3) UL (ref 0–0.2)
BASOPHILS NFR BLD AUTO: 0.1 %
BLOOD TYPE BARCODE: 5100
BLOOD TYPE BARCODE: 6200
BUN BLD-MCNC: 10 MG/DL (ref 9–23)
CALCIUM BLD-MCNC: 9 MG/DL (ref 8.7–10.4)
CHLORIDE SERPL-SCNC: 111 MMOL/L (ref 98–112)
CO2 SERPL-SCNC: 22 MMOL/L (ref 21–32)
CREAT BLD-MCNC: 0.74 MG/DL
EGFRCR SERPLBLD CKD-EPI 2021: 99 ML/MIN/1.73M2 (ref 60–?)
EOSINOPHIL # BLD AUTO: 0 X10(3) UL (ref 0–0.7)
EOSINOPHIL NFR BLD AUTO: 0 %
ERYTHROCYTE [DISTWIDTH] IN BLOOD BY AUTOMATED COUNT: 12.8 %
GLUCOSE BLD-MCNC: 112 MG/DL (ref 70–99)
GLUCOSE BLD-MCNC: 116 MG/DL (ref 70–99)
GLUCOSE BLD-MCNC: 118 MG/DL (ref 70–99)
GLUCOSE BLD-MCNC: 121 MG/DL (ref 70–99)
GLUCOSE BLD-MCNC: 122 MG/DL (ref 70–99)
GLUCOSE BLD-MCNC: 122 MG/DL (ref 70–99)
GLUCOSE BLD-MCNC: 126 MG/DL (ref 70–99)
GLUCOSE BLD-MCNC: 127 MG/DL (ref 70–99)
GLUCOSE BLD-MCNC: 129 MG/DL (ref 70–99)
GLUCOSE BLD-MCNC: 158 MG/DL (ref 70–99)
HCT VFR BLD AUTO: 28.6 %
HGB BLD-MCNC: 9.7 G/DL
IMM GRANULOCYTES # BLD AUTO: 0.04 X10(3) UL (ref 0–1)
IMM GRANULOCYTES NFR BLD: 0.4 %
LYMPHOCYTES # BLD AUTO: 0.71 X10(3) UL (ref 1–4)
LYMPHOCYTES NFR BLD AUTO: 6.4 %
MAGNESIUM SERPL-MCNC: 2.3 MG/DL (ref 1.6–2.6)
MCH RBC QN AUTO: 29.4 PG (ref 26–34)
MCHC RBC AUTO-ENTMCNC: 33.9 G/DL (ref 31–37)
MCV RBC AUTO: 86.7 FL
MONOCYTES # BLD AUTO: 0.88 X10(3) UL (ref 0.1–1)
MONOCYTES NFR BLD AUTO: 8 %
NEUTROPHILS # BLD AUTO: 9.37 X10 (3) UL (ref 1.5–7.7)
NEUTROPHILS # BLD AUTO: 9.37 X10(3) UL (ref 1.5–7.7)
NEUTROPHILS NFR BLD AUTO: 85.1 %
PLATELET # BLD AUTO: 130 10(3)UL (ref 150–450)
POTASSIUM SERPL-SCNC: 3.9 MMOL/L (ref 3.5–5.1)
RBC # BLD AUTO: 3.3 X10(6)UL
SODIUM SERPL-SCNC: 143 MMOL/L (ref 136–145)
T3FREE SERPL-MCNC: 2.7 PG/ML (ref 2.4–4.2)
T4 FREE SERPL-MCNC: 1.1 NG/DL (ref 0.8–1.7)
TSI SER-ACNC: 0.08 UIU/ML (ref 0.55–4.78)
UNIT VOLUME: 257 ML
UNIT VOLUME: 338 ML
WBC # BLD AUTO: 11 X10(3) UL (ref 4–11)

## 2025-01-07 PROCEDURE — 99232 SBSQ HOSP IP/OBS MODERATE 35: CPT | Performed by: HOSPITALIST

## 2025-01-07 PROCEDURE — 71045 X-RAY EXAM CHEST 1 VIEW: CPT | Performed by: PHYSICIAN ASSISTANT

## 2025-01-07 PROCEDURE — 71045 X-RAY EXAM CHEST 1 VIEW: CPT | Performed by: THORACIC SURGERY (CARDIOTHORACIC VASCULAR SURGERY)

## 2025-01-07 RX ORDER — AMIODARONE HYDROCHLORIDE 200 MG/1
200 TABLET ORAL DAILY
Status: DISCONTINUED | OUTPATIENT
Start: 2025-01-14 | End: 2025-01-10

## 2025-01-07 RX ORDER — FUROSEMIDE 10 MG/ML
40 INJECTION INTRAMUSCULAR; INTRAVENOUS DAILY
Status: DISCONTINUED | OUTPATIENT
Start: 2025-01-07 | End: 2025-01-08

## 2025-01-07 RX ORDER — AMIODARONE HYDROCHLORIDE 200 MG/1
400 TABLET ORAL DAILY
Status: DISCONTINUED | OUTPATIENT
Start: 2025-01-07 | End: 2025-01-10

## 2025-01-07 RX ORDER — ENALAPRIL MALEATE 5 MG/1
5 TABLET ORAL DAILY
Status: DISCONTINUED | OUTPATIENT
Start: 2025-01-07 | End: 2025-01-10

## 2025-01-07 NOTE — PHYSICAL THERAPY NOTE
PHYSICAL THERAPY EVALUATION - INPATIENT     Room Number: 6602/6602-A  Evaluation Date: 1/7/2025  Type of Evaluation: Initial  Physician Order: PT Eval and Treat    Presenting Problem: s/p 1.Aortic valve replacement with 23 mm Inspiris bovine pericardial valve.  2.Single saphenous vein graft to the obtuse marginal.  3.EnCompass radiofrequency maze.  4.Amputation of left atrial appendage.  1/6  Co-Morbidities : atrial fibrillation, aortic stenosis, CAD, hyperthyroidism, hypertension and hyperlipidemia  Reason for Therapy: Mobility Dysfunction and Discharge Planning    PHYSICAL THERAPY ASSESSMENT   Patient is a 69 year old male admitted 1/6/2025 for planned procedure.  Prior to admission, patient's baseline is indep.  Patient is currently functioning below baseline with bed mobility, transfers, gait, and stair negotiation.  Patient is requiring contact guard assist as a result of the following impairments: medical status.  Physical Therapy will continue to follow for duration of hospitalization.    Patient will benefit from continued skilled PT Services at discharge to promote prior level of function.  Anticipate patient will return home with home health PT.    PLAN DURING HOSPITALIZATION  Nursing Mobility Recommendation : 1 Assist  PT Device Recommendation: Rolling walker  PT Treatment Plan: Bed mobility;Body mechanics;Coordination;Endurance;Energy conservation;Patient education;Family education;Gait training;Neuromuscular re-educate;Range of motion;Strengthening;Stoop training;Stair training;Transfer training;Balance training  Rehab Potential : Good  Frequency (Obs): 3-5x/week     CURRENT GOALS    Goal #1 Patient is able to demonstrate supine - sit EOB @ level: supervision     Goal #2 Patient is able to demonstrate transfers EOB to/from Chair/Wheelchair at assistance level: supervision     Goal #3 Patient is able to ambulate 150 feet with assist device: walker - rolling at assistance level: supervision     Goal #4  Patient will navigate stairs with rail and SBA   Goal #5 Patient will participate in CV binder HEP   Goal #6    Goal Comments: Goals established on 2025      PHYSICAL THERAPY MEDICAL/SOCIAL HISTORY  History related to current admission: Patient is a 69 year old male admitted on 2025 : Presenting Problem: s/p 1.Aortic valve replacement with 23 mm Inspiris bovine pericardial valve.  2.Single saphenous vein graft to the obtuse marginal.  3.EnCompass radiofrequency maze.  4.Amputation of left atrial appendage.      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\"     OBJECTIVE  Precautions: Sternal;Cardiac  Fall Risk: Standard fall risk    WEIGHT BEARING RESTRICTION     PAIN ASSESSMENT  Ratin  Location: sternum  Management Techniques: Activity promotion;Body mechanics;Repositioning    COGNITION  Overall Cognitive Status:  WFL - within functional limits    RANGE OF MOTION AND STRENGTH ASSESSMENT  Upper extremity ROM and strength are within functional limits     Lower extremity ROM is within functional limits     Lower extremity strength is within functional limits     BALANCE  Static Sitting: Good  Dynamic Sitting: Good  Static Standing: Good  Dynamic Standing: Good    ADDITIONAL TESTS                                    ACTIVITY TOLERANCE  Pulse: 74  Heart Rate Source: Monitor     BP: 123/68  BP Location: Right arm  BP Method: Automatic  Patient Position: Sitting    O2 WALK       NEUROLOGICAL FINDINGS                        AM-PAC '6-Clicks' INPATIENT SHORT FORM - BASIC MOBILITY  How much difficulty does the patient currently have...  Patient Difficulty: Turning over in bed (including adjusting bedclothes, sheets and blankets)?: None    Patient Difficulty: Sitting down on and standing up from a chair with arms (e.g., wheelchair, bedside commode, etc.): None   Patient Difficulty: Moving from lying on back to sitting on the side of the bed?: None   How much help from another person does the patient currently need...   Help from Another: Moving to and from a bed to a chair (including a wheelchair)?: A Little   Help from Another: Need to walk in hospital room?: A Little   Help from Another: Climbing 3-5 steps with a railing?: A Little     AM-PAC Score:  Raw Score: 21   Approx Degree of Impairment: 28.97%   Standardized Score (AM-PAC Scale): 50.25   CMS Modifier (G-Code): CJ    FUNCTIONAL ABILITY STATUS  Gait Assessment   Functional Mobility/Gait Assessment  Gait Assistance: Contact guard assist  Distance (ft): 100  Assistive Device: Rolling walker  Pattern: Within Functional Limits    Skilled Therapy Provided     Bed Mobility:  Rolling: SBA  Supine to sit: SBA   Sit to supine: NT     Transfer Mobility:  Sit to stand: CGA   Stand to sit: CGA  Gait = CGA    Therapist's Comments: Discussed case with RN prior to session initiation. Pt agreeable to participation in therapy. Gait belt donned for out of bed mobility. Pt educated on role of therapy throughout stay, post op precautions with cuing for appropriate body mechanics during bed mobility (log roll technique) and UE placement during STS transfer to RW. Pt participated in gait training with RW. Educated on EC and pacing techniques and importance of continued ambulation for recovery per post op protocol. Pt tolerated session well HR 74 with activity.         Exercise/Education Provided:  Bed mobility  Body mechanics  Energy conservation  Functional activity tolerated  Gait training  Transfer training    Patient End of Session: Needs met;Call light within reach;RN aware of session/findings;All patient questions and concerns addressed;Hospital anti-slip socks;Alarm set;Up in chair      Patient Evaluation  Complexity Level:  History Low - no personal factors and/or co-morbidities   Examination of body systems Low -  addressing 1-2 elements   Clinical Presentation Low- Stable   Clinical Decision Making Low Complexity       PT Session Time: 20 minutes  Gait Training: 10 minutes  Therapeutic Activity:  minutes  Neuromuscular Re-education:  minutes  Therapeutic Exercise:  minutes

## 2025-01-07 NOTE — CONSULTS
[facility]  Consultation    Lopez Miranda Patient Status:  Inpatient    1955 MRN [id number]   Location [facility] 6NE-A Attending Howie Diaz MD   Hosp Day # 1 PCP Maria Elena Serrano MD     Consults    Reason for Consultation:  H/o AF, junctional rhythm    History of Present Illness:  Lopez Miranda is a a(n) 69 year old male with h/o AF s/p prior PVI/CTI, hyperthyroidism on methimazole, BAV with severe stenosis, CAD now s/p bioAVR, single vessel CABG, maze, and AICHA amputation. EP is consulted for junctional rhythm. Postoperatively pt was started on IV amiodarone for AF prevention. Rhythm has been sinus/sinus juan alberto with competing accelerated junctional rhythm. He feels well.     History:  Past Medical History:    A-fib (HCC)    Aortic stenosis    Arrhythmia    Coronary artery disease involving native coronary artery of native heart without angina pectoris    Coronary atherosclerosis    Disorder of thyroid    Essential hypertension    High blood pressure    High cholesterol    Hyperlipidemia     Past Surgical History:   Procedure Laterality Date    Colonoscopy      Other      right shoulder      History reviewed. No pertinent family history.   reports that he has never smoked. He has never used smokeless tobacco. He reports current alcohol use. He reports that he does not use drugs.    Allergies:  Allergies[1]    Medications:    Current Facility-Administered Medications:     insulin aspart (NovoLOG) 100 Units/mL FlexPen 1-10 Units, 1-10 Units, Subcutaneous, TID AC and HS    furosemide (Lasix) 10 mg/mL injection 40 mg, 40 mg, Intravenous, Daily    midazolam (Versed) 2 MG/2ML injection 1 mg, 1 mg, Intravenous, Q5 Min PRN    chlorhexidine gluconate (Peridex) 0.12 % oral solution 15 mL, 15 mL, Mouth/Throat, BID@0800,2000    aspirin chewable tab 81 mg, 81 mg, Oral, BID    rosuvastatin (Crestor) tab 10 mg, 10 mg, Oral, Nightly    methIMAzole (Tapazole) tab 10 mg, 10 mg, Oral, QAM **AND**  methIMAzole (Tapazole) tab 15 mg, 15 mg, Oral, QPM    sodium chloride 0.9% infusion, , Intravenous, Continuous    melatonin tab 3 mg, 3 mg, Oral, Nightly PRN    sennosides (Senokot) tab 8.6 mg, 8.6 mg, Oral, BID    docusate sodium (Colace) cap 100 mg, 100 mg, Oral, BID    polyethylene glycol (PEG 3350) (Miralax) 17 g oral packet 17 g, 17 g, Oral, Daily PRN    bisacodyl (Dulcolax) 10 MG rectal suppository 10 mg, 10 mg, Rectal, Daily PRN    albumin human (Albumin) 5% injection 12.5 g, 12.5 g, Intravenous, Once PRN    DOBUTamine in dextrose 5% (Dobutrex) 500 mg/250mL infusion premix, 2.5-20 mcg/kg/min (Dosing Weight), Intravenous, Continuous PRN    nitroGLYCERIN in dextrose 5% 50 mg/250mL infusion premix, 5-300 mcg/min, Intravenous, Continuous PRN    NitroPRUSSide (Nipride) 50 mg in dextrose 5% 250 mL infusion, 0.1-4 mcg/kg/min (Dosing Weight), Intravenous, Continuous PRN    metoprolol tartrate (Lopressor) partial tab 12.5 mg, 12.5 mg, Oral, 2x Daily(Beta Blocker)    mupirocin (Bactroban) 2% nasal ointment 1 Application, 1 Application, Nasal, BID    morphINE PF 2 MG/ML injection 2 mg, 2 mg, Intravenous, Q2H PRN **OR** morphINE PF 4 MG/ML injection 4 mg, 4 mg, Intravenous, Q2H PRN    pantoprazole (Protonix) 40 mg in sodium chloride 0.9% PF 10 mL IV push, 40 mg, Intravenous, QAM AC **OR** pantoprazole (Protonix) DR tab 40 mg, 40 mg, Oral, QAM AC    ceFAZolin (Ancef) 2g in 10mL IV syringe premix, 2 g, Intravenous, Q8H    sodium chloride 0.9% infusion, , Intravenous, Continuous    HYDROmorphone in sodium chloride 0.9% (Dilaudid) 20 mg/100mL PCA premix, , Intravenous, Continuous    HYDROmorphone 0.4 mg BOLUS FROM PCA, 0.4 mg, Intravenous, Q30 Min PRN    naloxone (Narcan) 0.4 MG/ML injection 0.08 mg, 0.08 mg, Intravenous, Q5 Min PRN    diphenhydrAMINE (Benadryl) 50 mg/mL  injection 12.5 mg, 12.5 mg, Intravenous, Q4H PRN    ondansetron (Zofran) 4 MG/2ML injection 4 mg, 4 mg, Intravenous, Q6H PRN    glucose (Dex4) 15 GM/59ML  oral liquid 15 g, 15 g, Oral, Q15 Min PRN **OR** glucose (Glutose) 40% oral gel 15 g, 15 g, Oral, Q15 Min PRN **OR** glucose-vitamin C (Dex-4) chewable tab 4 tablet, 4 tablet, Oral, Q15 Min PRN **OR** dextrose 50% injection 50 mL, 50 mL, Intravenous, Q15 Min PRN **OR** glucose (Dex4) 15 GM/59ML oral liquid 30 g, 30 g, Oral, Q15 Min PRN **OR** glucose (Glutose) 40% oral gel 30 g, 30 g, Oral, Q15 Min PRN **OR** glucose-vitamin C (Dex-4) chewable tab 8 tablet, 8 tablet, Oral, Q15 Min PRN    insulin regular human (Novolin R, Humulin R) 100 Units in sodium chloride 0.9% 100 mL standard infusion (100 mL), 1-57 Units/hr, Intravenous, Continuous    Review of Systems:  Gen: No fevers, chills, fatigue  Head and neck: No headaches  ENT: No visual changes, sore throat   Respiratory: No SOB, cough, wheezing  Cardiac: see HPI  GI: No diarrhea, nausea, vomiting  : No frequency, dysuria  Skin: no rashes  Neuro: No weakness, no numbness  Psych: No depression    OBJECTIVE  Blood pressure 130/84, pulse 71, temperature 98.6 °F (37 °C), temperature source Pulmonary Artery, resp. rate 22, height 5' 11\" (1.803 m), weight 223 lb 1.7 oz (101.2 kg), SpO2 97%.  Temp (24hrs), Av.2 °F (36.2 °C), Min:94.7 °F (34.8 °C), Max:98.6 °F (37 °C)    Wt Readings from Last 3 Encounters:   25 223 lb 1.7 oz (101.2 kg)   24 210 lb (95.3 kg)   24 216 lb 8 oz (98.2 kg)       Physical Exam:  Gen: NAD. A&Ox3  HEENT: JVP not distended  Lungs: CTAB. No wheezes or crackles  Cardiac: RRR, normal S1/S2, 1/6 murmur at base  Abd: soft, NT, ND  Ext: Extremities warm and well perfused. Trace LE edema  Neuro: No focal deficits    Telemetry: sinus/sinus juan alberto with competing junctional    Diagnostics History:  EKG:sinus with competing junctional, intermittent RBBB     Results:   Recent Labs   Lab 25  1208 25  0417   * 122*   BUN 11 10   CREATSERUM 0.79 0.74   EGFRCR 97 99   CA 9.8 9.0    143   K 4.7 3.9    111   CO2  26.0 22.0     Recent Labs   Lab 01/06/25  1059 01/06/25  1208 01/07/25  0417   RBC  --  3.51* 3.30*   HGB  --  10.4* 9.7*   HCT  --  30.2* 28.6*   MCV  --  86.0 86.7   MCH  --  29.6 29.4   MCHC  --  34.4 33.9   RDW  --  12.3 12.8   NEPRELIM  --   --  9.37*   WBC  --  7.9 11.0   PLT 76.0* 101.0* 130.0*         No results for input(s): \"BNP\" in the last 168 hours.  Lab Results   Component Value Date    INR 1.34 (H) 01/06/2025    INR 1.46 (H) 01/06/2025    INR 1.19 12/30/2024     Lab Results   Component Value Date    TROP <0.046 01/12/2017         XR CHEST AP PORTABLE  (CPT=71045)    Result Date: 1/7/2025  CONCLUSION:  Interval removal of endotracheal tube and nasogastric tube.  Remaining support tubes and lines are stable.  There is dependent atelectasis in lower lobes.   LOCATION:  [facility]      Dictated by (CST): Jerson Pal MD on 1/07/2025 at 7:26 AM     Finalized by (CST): Jerson Pal MD on 1/07/2025 at 7:27 AM       XR CHEST AP PORTABLE  (CPT=71045)    Result Date: 1/6/2025  CONCLUSION:  Postoperative changes of recent heart valve surgery.  No measurable pneumothorax or significant pleural effusion.  Lines and catheters are noted as detailed above.   LOCATION:  [facility]      Dictated by (CST): Herb Gibbs MD on 1/06/2025 at 2:10 PM     Finalized by (CST): Herb Gibbs MD on 1/06/2025 at 2:13 PM           Assessment and Plan  Lopez Miranda is a a(n) 69 year old male with h/o AF s/p prior PVI/CTI, BAV with severe stenosis, CAD now s/p bioAVR, single vessel CABG, maze, and AICHA amputation.     H/o AF s/p ablation  Accelerated junctional rhythm  - No evidence of AV block  - Can continue amiodarone for afib suppression but would do a limited course, low dose to avoid hyperthyroidism  - Stop amio gtt. Start PO amio 400mg daily 7 days, then 200mg daily 7 days, then stop  - Obtain TSH/T4 today and in 1 week  - Continue telemetry monitoring while inpatient.     Plan discussed with patient.    Please call  with questions. Thank you for your consult.    Carloz Turner MD  Cardiac Electrophysiology  [facility] [place]         [1] No Known Allergies

## 2025-01-07 NOTE — H&P
RE:Lopez MIRANDA  :1955     Mr. Miranda is a very pleasant 69-year-old gentleman.  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  He has had problematic atrial fibrillation in the past and has undergone two ablations.  The second of which now has been successful for a couple of years.  During that time is when his aortic valve pathology was discovered.  He has now been followed with serial echocardiogram.       The most recent echo demonstrates greater than 4 m/second flow with a peak gradient of 80 mmHg and mean gradient of approximately 60 mmHg.  Left ventricular systolic function is preserved.  There is no other valvular pathology.  The rhythm at present is sinus.  He has not undergone coronary angiography yet.       Past medical history is remarkably unremarkable.  He is treated for hypertension and dyslipidemia.  Other than that history is negative for diabetes, heart attack, stent, stroke, renal disease, liver disease, cigarette smoking, TB, cancer, or vein stripping.  Past surgical history is notable for the two ablations, right shoulder surgery, and traumatic amputation of a finger on the left hand.       The patient is on Eliquis as he is still at some risk for recurrent atrial fibrillation.       The patient has no known drug allergies.      On review of systems, the patient has no complaints of blurred vision or hearing problems.  The patient denies palpitations, wheezing, syncope, or dizziness.  There are no symptoms of GI reflux, urgency or frequency of urination, or rashes.  The patient has normal mobility.  The patient denies any history of bleeding disorders.     Physical examination shows a gentleman who is 6' tall and weighs 216 pounds.  BMI is 29.36.  BSA is 2.2 m2.  Pupils are equal and round.  Carotid pulses are equal bilaterally.  There is conducted murmur over both carotids.  The lungs are clear without rales or wheezing.  Cardiac  examination shows a regular rhythm.  S1 and S2 are normal.  There is no S3.  There is a grade 3/6 systolic ejection murmur.  The abdomen is benign.  Examination of the extremities shows no edema.     We have had a lengthy discussion today regarding the nature of the patient's cardiovascular condition that being severe aortic stenosis.  He is, I would say, minimally symptomatic.  We have discussed options for treatment including medical therapy, transcatheter treatment, or surgical correction.  I think as he is young and otherwise in good health he very likely would outlive a TAVR valve.  Therefore, I think surgical valve replacement for him is not out of the question.  The valve is probably trileaflet based on CT.       At surgery we would replace his aortic valve with a tissue valve per his request.  We would also do EnCompass Maze and amputate the left atrial appendage for further security against atrial fibrillation.  The patient is in agreement with all of this.  Operative risk is 1% and I have discussed the STS risk factors and score with the patient.  He would like to wait until after January which is fine.       Thank you for allowing us to see this patient in consultation.     Sincerely,    Howie Diaz M.D.  JOSE ENRIQUE/cara

## 2025-01-07 NOTE — PROGRESS NOTES
[place]   part of [facility]     Cardiology Progress Note        Lopez Miranda Patient Status:  Inpatient    1955 MRN [id number]   Location [facility] 6NE-A Attending Howie Diaz MD   Hosp Day # 1 PCP Maria Elena Serrano MD         Subjective/ROS:  Up in chair. Feels \"great\". Pain controlled, Breathing comfortable. Slept well. Tolerating ice chips this am     Objective:  /77   Pulse 75   Temp 98.5 °F (36.9 °C) (Pulmonary Artery)   Resp 26   Ht 5' 11\" (1.803 m)   Wt 223 lb 1.7 oz (101.2 kg)   SpO2 96%   BMI 31.12 kg/m²     Temp (24hrs), Av.2 °F (36.2 °C), Min:94.7 °F (34.8 °C), Max:98.5 °F (36.9 °C)      Tele  Sr with 1st degree avb, intermittent accelerated junctional runs.     Intake/Output:    Intake/Output Summary (Last 24 hours) at 2025 0734  Last data filed at 2025 0731  Gross per 24 hour   Intake 5660.5 ml   Output 1675 ml   Net 3985.5 ml       Patient Weight for the past 72 hrs:   Weight   25 0600 211 lb 10.3 oz (96 kg)   25 0500 223 lb 1.7 oz (101.2 kg)        Physical Exam:    Gen: alert, oriented x 3, NAD  Heent: pupils equal, reactive. Mucous membranes moist.   Neck: no jvd. RIJ swan in place  Cardiac: regular rate and rhythm, normal S1,S2  Lungs: diminished yary bases   Abd: soft, NT/ND +bs  Ext: mild generalized  edema  Skin: Warm, dry  Neuro: No focal deficits    Labs:  Lab Results   Component Value Date    WBC 11.0 2025    HGB 9.7 2025    HCT 28.6 2025    .0 2025    CREATSERUM 0.74 2025    BUN 10 2025     2025    K 3.9 2025     2025    CO2 22.0 2025     2025    CA 9.0 2025    PTT 30.0 2025    INR 1.34 2025    MG 2.3 2025       CXR: Reviewed. Yary basilar atelectasis.     Medications:     chlorhexidine gluconate  15 mL Mouth/Throat BID@0800,    aspirin  81 mg Oral BID    rosuvastatin  10 mg Oral Nightly     methIMAzole  10 mg Oral QAM    And    methIMAzole  15 mg Oral QPM    sennosides  8.6 mg Oral BID    docusate sodium  100 mg Oral BID    vancomycin  15 mg/kg Intravenous Q12H    metoprolol tartrate  12.5 mg Oral 2x Daily(Beta Blocker)    mupirocin  1 Application Nasal BID    pantoprazole  40 mg Intravenous QAM AC    Or    pantoprazole  40 mg Oral QAM AC    ceFAZolin  2 g Intravenous Q8H      dexmedetomidine Stopped (01/06/25 1600)    sodium chloride 20 mL/hr at 01/07/25 0400    DOBUTamine Stopped (01/07/25 0600)    nitroGLYCERIN in dextrose 5% 20 mcg/min (01/07/25 0400)    norepinephrine      NitroPRUSSide (Nipride) 50 mg in dextrose 5% 250 mL infusion      dextrose 5%-sodium chloride 0.45% 30 mL/hr (01/07/25 0400)    sodium chloride 10 mL/hr at 01/06/25 1210    HYDROmorphone in sodium chloride 0.9%      amiodarone 0.5 mg/min (01/07/25 0400)    propofol Stopped (01/06/25 1500)    insulin regular 4 Units/hr (01/07/25 0700)       Assessment:    Severe bicuspid aortic valve stenosis, cad s/p avr 23 mm inspiris bioprosthesis, cabg x 1 (svg to om), surgical maze and removal of AICHA- pod 1- ef 65%   Hemodynamically stable. Just on iv ntg for bp control  Hx afib- per notes, s/p RF PVI, RF  CTI 1/24/24 and has been in sr since. Was on eliquis pta although per office note, was to stop in August.   Intermittent accelerated JR competing with sinus mechanism  Junctional rate faster than sinus rate- IV amio/bb may be exacerbating  Htn- on low dose iv ntg to maintain bp parameters  Post op volume excess- IV lasix if ok with [facility]  Expected anemia, tcp- hgb 9.7, plts 130k. Follow trends  Hyperthyroid on methimazole  HL- on statin     Plan:     IV lasix   D/w Dr Turner. Will transition to short course of PO amio to try and avoid him going back into afib.  Will do 400 mg po daily x 1 week, then 200 mg daily x 1 week, then dc  Ok to continue low dose bb for now   Will check tsh with reflex today and again in one week  De-line as day  progresses  Can resume home enalapril as needed for bp control     Discussed plan of care with patient, [facility], dr arshad and nursing.       Kymberly Vanegas, NP  [phone]    Seen and examined. MDM per me. Day 1 post AVR/CABG/maize and AICHA removal with good LV fxn. Had afib earlier. Started on amio. Has hx ablation with now some junctional rhythm. No need for pacer yet. Transfuse as needed. Lasix for volume overload.

## 2025-01-07 NOTE — PROGRESS NOTES
[facility]   part of [facility]     CV Surgery Progress Note    Lopez Miranda Patient Status:  Inpatient    1955 MRN [id number]   Location [facility] 6NE-A Attending Howie Diaz MD   Hosp Day # 1 PCP Maria Elena Serrano MD     Subjective:  Patient sitting in chair, reports feeling good. Pain is controlled. Denies any dyspnea. Brief nausea this morning.     Tele: SR with intermittent accelerated JR     Objective:  /77   Pulse 75   Temp 98.5 °F (36.9 °C) (Pulmonary Artery)   Resp 26   Ht 5' 11\" (1.803 m)   Wt 223 lb 1.7 oz (101.2 kg)   SpO2 96%   BMI 31.12 kg/m²     Intake/Output:    Intake/Output Summary (Last 24 hours) at 2025 0826  Last data filed at 2025 0731  Gross per 24 hour   Intake 5660.5 ml   Output 1675 ml   Net 3985.5 ml       Labs:  Lab Results   Component Value Date    WBC 11.0 2025    RBC 3.30 2025    HGB 9.7 2025    HCT 28.6 2025    MCV 86.7 2025    MCH 29.4 2025    MCHC 33.9 2025    RDW 12.8 2025    .0 2025     Lab Results   Component Value Date     2025    K 3.9 2025     2025    CO2 22.0 2025    BUN 10 2025    CREATSERUM 0.74 2025     2025    CA 9.0 2025     Lab Results   Component Value Date    INR 1.34 (H) 2025    INR 1.46 (H) 2025    INR 1.19 2024       Studies:  CXR:   Bibasilar atelectasis     Physical Exam:  General: VSS, A&Ox3, In NAD  Neck: No JVD. Covert/Cordis in RIJ  Lungs: clear anteriorly   Heart: S1,S2 RRR. Sternum Stable   Abdomen: Soft, non-tender  Extremities: Warm, dry, trace generalized edema  Skin: sternotomy incision C/D/I, LE with ACE wrap   Neuro: no focal deficits     Assessment/Plan:   S/P AVR with a 23mm Inspiris bovine pericardial valve, CABGx1 with SVG-OM, EnCompass MAZE, and amputation of AICHA POD#1  -Hypertension, nitroglycerin gtt just weaned off   -Hx of Afib, currently SR with  intermittent accelerated JR- discontinue amio gtt, EP to weigh in on Afib ppx   -Hyperthyroid, on methimazole   -Acute post op blood loss anemia, expected- monitor   -TCP, likely 2/2 consumption- monitor   -Volume OL, start lasix   -Renal function intact, good UOP  -Bowel regimen   -Pain management, dilaudid pca/IV tylenol  -Discontinue chest tubes   -Keep PW for now   -GI PPX: protonix   -DVT PPX: TEDs/SCDs  -Encourage IS/ambulation   -PT/OT/Cardiac rehab   -CCU monitoring     -D/W Dr. Emily Elizondo, PA-C  Cardiothoracic Surgery   1/7/2025  8:26 AM

## 2025-01-07 NOTE — PROGRESS NOTES
01/06/25 1759   BiPAP   $ RT Standby Charge (per 15 min) 1  (MARILYN EVAL. Pt doesn't have home CPAP)   BiPAP/CPAP Monitored Parameters   MARILYN Protocol Yes   Toleration Refused

## 2025-01-07 NOTE — CM/SW NOTE
01/07/25 1009   Choice of Post-Acute Provider   Informed patient of right to choose their preferred provider Yes   List of appropriate post-acute services provided to patient/family with quality data Yes   Patient/family choice Undecided-please follow up   Information given to Patient     CM met w/ pt today to discuss HH services. List of available providers given to pt for review. Pt has not had HH services previously, but is open to services following his surgery.     Pt will review available providers. D/w pt CM/SW will follow up for his choice of agency.    VIDA OlivarezN, CMSRN    [id number]

## 2025-01-07 NOTE — PLAN OF CARE
Assumed patient care at 0730. Patient sitting up in chair, alert/oriented. Room air, encouraging IS use. Monitor shows sinus rhythm w/ occasional pvcs. Amio gtt transitioned to PO. SBP within parameters. Delined except pacing wires. No complications. PCA in use, patient states good pain relief. Ambulating hallway with walker and standby assist, tolerating well. Patient and family updated with plan of care, all questions answered.

## 2025-01-07 NOTE — OCCUPATIONAL THERAPY NOTE
OCCUPATIONAL THERAPY EVALUATION - INPATIENT     Room Number: 6602/6602-A  Evaluation Date: 1/7/2025  Type of Evaluation: Initial  Presenting Problem: 1.Aortic valve replacement with 23 mm Inspiris bovine pericardial valve.  2.Single saphenous vein graft to the obtuse marginal.  3.EnCompass radiofrequency maze.  4.Amputation of left atrial appendage.  1/6    Physician Order: IP Consult to Occupational Therapy  Reason for Therapy: ADL/IADL Dysfunction and Discharge Planning    OCCUPATIONAL THERAPY ASSESSMENT   Patient is currently functioning near baseline with toileting, bathing, upper body dressing, lower body dressing, bed mobility, and transfers. Prior to admission, patient's baseline is independent.  Patient is requiring minimum assistance as a result of the following impairments: decreased functional strength, decreased functional reach, decreased endurance, pain, and medical status. Occupational Therapy will continue to follow for duration of hospitalization.    Patient will benefit from continued skilled OT Services at discharge to promote prior level of function and safety with additional support and return home with home health OT    History Related to Current Admission: Patient is a 69 year old male admitted on 1/6/2025 with Presenting Problem: 1.Aortic valve replacement with 23 mm Inspiris bovine pericardial valve.  2.Single saphenous vein graft to the obtuse marginal.  3.EnCompass radiofrequency maze.  4.Amputation of left atrial appendage.  1/6. Co-Morbidities : atrial fibrillation, aortic stenosis, CAD, hyperthyroidism, hypertension and hyperlipidemia    WEIGHT BEARING RESTRICTION       Recommendations for nursing staff:   Transfers: x1 with walker   Toileting location: bathroom    EVALUATION SESSION:  Patient Start of Session: bed    FUNCTIONAL TRANSFER ASSESSMENT  Sit to Stand: Edge of Bed  Edge of Bed: Stand-by Assist    BED MOBILITY  Supine to Sit : Minimal Assist    BALANCE ASSESSMENT      FUNCTIONAL ADL ASSESSMENT  LB Dressing Seated: Moderate Assist  Toileting Standing: Moderate Assist    ACTIVITY TOLERANCE: good                         O2 SATURATIONS       COGNITION  Overall Cognitive Status:  WFL - within functional limits    Upper Extremity   ROM: within functional limits   Strength: within functional limits   Coordination  Gross motor:   Fine motor:   Sensation:     EDUCATION PROVIDED  Patient Education : Role of Occupational Therapy; Plan of Care; DME Recommendations; Discharge Recommendations; Functional Transfer Techniques; Posture/Positioning; Surgical Precautions; Energy Conservation; Proper Body Mechanics  Patient's Response to Education: Verbalized Understanding    Equipment used: walker  Demonstrates functional use, Would benefit from additional trial      Therapist comments: Pt educated on surgical precautions and functional application to transfers and bed mobility. Ethan required for supine to sit, SBA for txs and bathroom distance ambulation this session.    Patient End of Session: Up in chair;Needs met;Call light within reach;RN aware of session/findings;Alarm set    OCCUPATIONAL PROFILE    HOME SITUATION  Type of Home: House  Home Layout: Two level  Lives With: Alone                     Drives: Yes       Prior Level of Function: independent    SUBJECTIVE   Very pleasant and cooperative     PAIN ASSESSMENT  Ratin  Location: surgical       OBJECTIVE  Precautions: Sternal;Cardiac  Fall Risk: Standard fall risk    ASSESSMENTS    AM-PAC ‘6-Clicks’ Inpatient Daily Activity Short Form  -   Putting on and taking off regular lower body clothing?: A Lot  -   Bathing (including washing, rinsing, drying)?: A Lot  -   Toileting, which includes using toilet, bedpan or urinal? : A Little  -   Putting on and taking off regular upper body clothing?: A Little  -   Taking care of personal grooming such as brushing teeth?: None  -   Eating meals?: None    AM-PAC Score:  Score: 18  Approx Degree  of Impairment: 46.65%  Standardized Score (AM-PAC Scale): 38.66    ADDITIONAL TESTS     NEUROLOGICAL FINDINGS      COGNITION ASSESSMENTS     PLAN     OT Treatment Plan: Balance activities;Energy conservation/work simplification techniques;ADL training;Functional transfer training;Patient/Family education;Patient/Family training;UE strengthening/ROM  Rehab Potential : Good  Frequency: 3x/week  Number of Visits to Meet Established Goals: 2    ADL Goals   Patient will perform lower body dressing:  with supervision  Patient will perform toileting: with supervision    Functional Transfer Goals  Patient will transfer to toilet:  with supervision    UE Exercise Program Goal  Patient will be independent with bilateral AROM HEP (home exercise program).    Additional Goals      Therapist Goals    Patient Evaluation Complexity Level:   Occupational Profile/Medical History LOW - Brief history including review of medical or therapy records    Specific performance deficits impacting engagement in ADL/IADL LOW  1 - 3 performance deficits    Client Assessment/Performance Deficits LOW - No comorbidities nor modifications of tasks    Clinical Decision Making LOW - Analysis of occupational profile, problem-focused assessments, limited treatment options    Overall Complexity LOW     OT Session Time: 25 minutes  Self-Care Home Management:  minutes  Therapeutic Activity: 15 minutes  Neuromuscular Re-education:  minutes  Therapeutic Exercise:  minutes  Cognitive Skills:  minutes  Sensory Integrative:  minutes  Orthotic Management and Training:  minutes  Can add/delete any of these

## 2025-01-07 NOTE — PROGRESS NOTES
[facility]   part of [facility]     Hospitalist Progress Note     Lopez Miranda Patient Status:  Inpatient    1955 MRN [id number]   Location [facility] 6NE-A Attending Howie Diaz MD   Hosp Day # 1 PCP Maria Elena Serrano MD     Chief Complaint: management of hyperglycemia    Subjective:     Patient has no complaints, reports feeling great, pain only at chest tube sit, no chest pain or shortness of breath    Objective:    Review of Systems:   A comprehensive review of systems was completed; pertinent positive and negatives stated in subjective.    Vital signs:  Temp:  [96.8 °F (36 °C)-98.8 °F (37.1 °C)] 98.8 °F (37.1 °C)  Pulse:  [64-80] 71  Resp:  [7-30] 24  BP: (101-149)/(65-90) 143/90  SpO2:  [92 %-100 %] 92 %  AO: (121-176)/(40-72) 155/68  FiO2 (%):  [50 %] 50 %    Physical Exam:    General: No acute distress  Respiratory: No wheezes, no rhonchi  Cardiovascular: S1, S2, regular rate and rhythm  Abdomen: Soft, Non-tender, non-distended, positive bowel sounds  Neuro: No new focal deficits.   Extremities: No edema      Diagnostic Data:    Labs:  Recent Labs   Lab 25  1059 25  1208 25  0417   WBC  --  7.9 11.0   HGB  --  10.4* 9.7*   MCV  --  86.0 86.7   PLT 76.0* 101.0* 130.0*   INR 1.46* 1.34*  --        Recent Labs   Lab 25  1208 25  0417   * 122*   BUN 11 10   CREATSERUM 0.79 0.74   CA 9.8 9.0    143   K 4.7 3.9    111   CO2 26.0 22.0       Estimated Glomerular Filtration Rate: 98.7 mL/min/1.73m2 (by CKD-EPI based on SCr of 0.74 mg/dL).    No results for input(s): \"TROP\", \"TROPHS\", \"CK\" in the last 168 hours.    Recent Labs   Lab 25  1059 25  1208   PTP 17.9* 16.7*   INR 1.46* 1.34*       Lab Results   Component Value Date    TSH 0.076 2025    T4F 1.1 2025    T3F 2.70 2025             Microbiology    No results found for this visit on 25.      Imaging: Reviewed in Epic.    Medications:    insulin aspart   1-10 Units Subcutaneous TID AC and HS    furosemide  40 mg Intravenous Daily    chlorhexidine gluconate  15 mL Mouth/Throat BID@0800,2000    aspirin  81 mg Oral BID    rosuvastatin  10 mg Oral Nightly    methIMAzole  10 mg Oral QAM    And    methIMAzole  15 mg Oral QPM    sennosides  8.6 mg Oral BID    docusate sodium  100 mg Oral BID    metoprolol tartrate  12.5 mg Oral 2x Daily(Beta Blocker)    mupirocin  1 Application Nasal BID    pantoprazole  40 mg Intravenous QAM AC    Or    pantoprazole  40 mg Oral QAM AC    ceFAZolin  2 g Intravenous Q8H       Assessment & Plan:      #Stress hyperglycemia  -not diabetic  -Hba1c 5.1  -insulin gtt dc and on subq insulin     #Aortic stenosis  #CAD  -s/p AVR,CABG, MAZE and AICHA 1/6/2025  -per CV Sx and cardiology     #Hyperthyroidism  -continue methimazole     #Hyperlipidemia  -statin    #Mild expected post op anemia  -trend    Rubi Argueta MD    Supplementary Documentation:     Quality:  DVT Mechanical Prophylaxis: JORDAN hose, ELISABETs,    DVT Pharmacologic Prophylaxis   Medication   None      DVT Pharmacologic prophylaxis: Aspirin 162 mg         Code Status: Not on file  Rivers: No urinary catheter in place  Rivers Duration (in days):   Central line:    SHERRY:     Discharge is dependent on: progress  At this point Mr. Miranda is expected to be discharge to: home    The 21st Century Cures Act makes medical notes like these available to patients in the interest of transparency. Please be advised this is a medical document. Medical documents are intended to carry relevant information, facts as evident, and the clinical opinion of the practitioner. The medical note is intended as peer to peer communication and may appear blunt or direct. It is written in medical language and may contain abbreviations or verbiage that are unfamiliar.

## 2025-01-07 NOTE — PLAN OF CARE
Assumed care of patient at 1930. Pt is AOx4, following commands, and CERRATO. Dobutamine infusing and titrated for C.I >2. Amiodarone infusing and insulin per post open heart protocol. Dilaudid PCA. Pt states, his pain is tolerable with PCA. Chest tube and chapin in place. Arterial line positional with poor waveform. Up to chair this am without difficulty. POC discussed with patient. For complete assessment see E charting.

## 2025-01-08 PROBLEM — I35.0 AORTIC VALVE STENOSIS: Status: ACTIVE | Noted: 2019-02-21

## 2025-01-08 PROBLEM — D62 ACUTE BLOOD LOSS ANEMIA: Status: ACTIVE | Noted: 2025-01-08

## 2025-01-08 LAB
ATRIAL RATE: 91 BPM
BUN BLD-MCNC: 12 MG/DL (ref 9–23)
CALCIUM BLD-MCNC: 8.9 MG/DL (ref 8.7–10.4)
CHLORIDE SERPL-SCNC: 104 MMOL/L (ref 98–112)
CO2 SERPL-SCNC: 28 MMOL/L (ref 21–32)
CREAT BLD-MCNC: 0.85 MG/DL
EGFRCR SERPLBLD CKD-EPI 2021: 95 ML/MIN/1.73M2 (ref 60–?)
ERYTHROCYTE [DISTWIDTH] IN BLOOD BY AUTOMATED COUNT: 12.9 %
GLUCOSE BLD-MCNC: 124 MG/DL (ref 70–99)
GLUCOSE BLD-MCNC: 136 MG/DL (ref 70–99)
GLUCOSE BLD-MCNC: 139 MG/DL (ref 70–99)
GLUCOSE BLD-MCNC: 140 MG/DL (ref 70–99)
GLUCOSE BLD-MCNC: 143 MG/DL (ref 70–99)
HCT VFR BLD AUTO: 28.3 %
HGB BLD-MCNC: 9.4 G/DL
MAGNESIUM SERPL-MCNC: 1.9 MG/DL (ref 1.6–2.6)
MCH RBC QN AUTO: 29.2 PG (ref 26–34)
MCHC RBC AUTO-ENTMCNC: 33.2 G/DL (ref 31–37)
MCV RBC AUTO: 87.9 FL
P AXIS: 78 DEGREES
P-R INTERVAL: 246 MS
PLATELET # BLD AUTO: 106 10(3)UL (ref 150–450)
POTASSIUM SERPL-SCNC: 3.9 MMOL/L (ref 3.5–5.1)
Q-T INTERVAL: 414 MS
QRS DURATION: 158 MS
QTC CALCULATION (BEZET): 509 MS
R AXIS: 81 DEGREES
RBC # BLD AUTO: 3.22 X10(6)UL
SODIUM SERPL-SCNC: 139 MMOL/L (ref 136–145)
T AXIS: 47 DEGREES
VENTRICULAR RATE: 91 BPM
WBC # BLD AUTO: 11.4 X10(3) UL (ref 4–11)

## 2025-01-08 PROCEDURE — 99232 SBSQ HOSP IP/OBS MODERATE 35: CPT | Performed by: INTERNAL MEDICINE

## 2025-01-08 RX ORDER — FUROSEMIDE 10 MG/ML
40 INJECTION INTRAMUSCULAR; INTRAVENOUS
Status: COMPLETED | OUTPATIENT
Start: 2025-01-08 | End: 2025-01-09

## 2025-01-08 RX ORDER — HYDROCODONE BITARTRATE AND ACETAMINOPHEN 5; 325 MG/1; MG/1
2 TABLET ORAL EVERY 4 HOURS PRN
Status: DISCONTINUED | OUTPATIENT
Start: 2025-01-08 | End: 2025-01-10

## 2025-01-08 RX ORDER — HYDROCODONE BITARTRATE AND ACETAMINOPHEN 5; 325 MG/1; MG/1
1 TABLET ORAL EVERY 4 HOURS PRN
Status: DISCONTINUED | OUTPATIENT
Start: 2025-01-08 | End: 2025-01-10

## 2025-01-08 NOTE — PROGRESS NOTES
[facility]   part of [facility]     Hospitalist Progress Note     Lopez Miranda Patient Status:  Inpatient    1955 MRN [id number]   Location [facility] 6NE-A Attending Howie Diaz MD   Hosp Day # 2 PCP Maria Elena Serrano MD     Chief Complaint:  feeling well     Subjective:     Patient has no complaints, ambulated with RN and overall doing well.     Objective:    Review of Systems:   A comprehensive review of systems was completed; pertinent positive and negatives stated in subjective.    Vital signs:  Temp:  [98 °F (36.7 °C)-99.5 °F (37.5 °C)] 98 °F (36.7 °C)  Pulse:  [70-84] 75  Resp:  [16-30] 28  BP: (120-146)/(66-94) 124/66  SpO2:  [88 %-96 %] 95 %    Physical Exam:    General: No acute distress  Respiratory: No wheezes, no rhonchi  Cardiovascular: S1, S2, regular rate and rhythm  Abdomen: Soft, Non-tender, non-distended, positive bowel sounds  Neuro: No new focal deficits.   Extremities: No edema      Diagnostic Data:    Labs:  Recent Labs   Lab 25  1059 25  1208 25  0417 25  0454   WBC  --  7.9 11.0 11.4*   HGB  --  10.4* 9.7* 9.4*   MCV  --  86.0 86.7 87.9   PLT 76.0* 101.0* 130.0* 106.0*   INR 1.46* 1.34*  --   --        Recent Labs   Lab 25  1208 25  0417 25  0454   * 122* 143*   BUN 11 10 12   CREATSERUM 0.79 0.74 0.85   CA 9.8 9.0 8.9    143 139   K 4.7 3.9 3.9    111 104   CO2 26.0 22.0 28.0       Estimated Glomerular Filtration Rate: 94.6 mL/min/1.73m2 (by CKD-EPI based on SCr of 0.85 mg/dL).    No results for input(s): \"TROP\", \"TROPHS\", \"CK\" in the last 168 hours.    Recent Labs   Lab 25  1059 25  1208   PTP 17.9* 16.7*   INR 1.46* 1.34*          Microbiology    No results found for this visit on 25.      Imaging: Reviewed in Epic.    Medications:    furosemide  40 mg Intravenous BID (Diuretic)    insulin aspart  1-10 Units Subcutaneous TID AC and HS    enalapril  5 mg Oral Daily    amiodarone  400  mg Oral Daily    Followed by    [START ON 1/14/2025] amiodarone  200 mg Oral Daily    chlorhexidine gluconate  15 mL Mouth/Throat BID@0800,2000    aspirin  81 mg Oral BID    rosuvastatin  10 mg Oral Nightly    methIMAzole  10 mg Oral QAM    And    methIMAzole  15 mg Oral QPM    sennosides  8.6 mg Oral BID    docusate sodium  100 mg Oral BID    metoprolol tartrate  12.5 mg Oral 2x Daily(Beta Blocker)    mupirocin  1 Application Nasal BID    pantoprazole  40 mg Intravenous QAM AC    Or    pantoprazole  40 mg Oral QAM AC       Assessment & Plan:      #Stress hyperglycemia  -not diabetic  -Hba1c 5.1  - ISS     #Aortic stenosis  #CAD  -s/p AVR,CABG, MAZE and AICHA 1/6/2025  -per CV Sx and cardiology  - diuresis per cardiology      # expected post op anemia and thrombocytopenia , consumptive, monitor     # PAF  - tele   - amiodarone     #Hyperthyroidism  -continue methimazole     #Hyperlipidemia  -statin    #Mild expected post op anemia  -trend    Dorita Guillen MD      Supplementary Documentation:     Quality:  DVT Mechanical Prophylaxis: JORDAN hose,      DVT Pharmacologic Prophylaxis   Medication   None      DVT Pharmacologic prophylaxis: Aspirin 162 mg         Code Status: Not on file  Rivers: No urinary catheter in place  Rivers Duration (in days):   Central line:    SHERRY:     Discharge is dependent on: progress  At this point Mr. Miranda is expected to be discharge to: home    The 21st Century Cures Act makes medical notes like these available to patients in the interest of transparency. Please be advised this is a medical document. Medical documents are intended to carry relevant information, facts as evident, and the clinical opinion of the practitioner. The medical note is intended as peer to peer communication and may appear blunt or direct. It is written in medical language and may contain abbreviations or verbiage that are unfamiliar.

## 2025-01-08 NOTE — PLAN OF CARE
Assumed care of patient at 1930. Pt is AOx4, following commands,and CERRATO. Denies SOB, dizziness, or nausea. Pt states good pain relief with DilaudId PCA. Encouraged pt to use his IS. Ambulating to bathroom without difficulty. POC discussed with patient. For complete assessment see E charting.

## 2025-01-08 NOTE — PROGRESS NOTES
Progress Note  Lopez Miranda Patient Status:  Inpatient    1955 MRN [id number]   Location [facility] 6NE-A Attending Howie Diaz MD   Hosp Day # 2 PCP Maria Elena Serrano MD     Subjective:  Up in chair. Looks and feels well. Ambulated yesterday without difficulty. Denies cp, sob.     Objective:  /72   Pulse 75   Temp 98 °F (36.7 °C) (Temporal)   Resp 21   Ht 5' 11\" (1.803 m)   Wt 221 lb 5.5 oz (100.4 kg)   SpO2 96%   BMI 30.87 kg/m²     Telemetry: nsr      Intake/Output:    Intake/Output Summary (Last 24 hours) at 2025 0842  Last data filed at 2025 0742  Gross per 24 hour   Intake 1501 ml   Output 1465 ml   Net 36 ml       Last 3 Weights   25 0355 221 lb 5.5 oz (100.4 kg)   25 0500 223 lb 1.7 oz (101.2 kg)   25 0600 211 lb 10.3 oz (96 kg)   24 0838 215 lb (97.5 kg)   24 1035 210 lb (95.3 kg)   24 1356 216 lb 8 oz (98.2 kg)       Labs:  Recent Labs   Lab 25  1208 25  0417 25  0454   * 122* 143*   BUN 11 10 12   CREATSERUM 0.79 0.74 0.85   EGFRCR 97 99 95   CA 9.8 9.0 8.9    143 139   K 4.7 3.9 3.9    111 104   CO2 26.0 22.0 28.0     Recent Labs   Lab 25  1208 25  0417 25  0454   RBC 3.51* 3.30* 3.22*   HGB 10.4* 9.7* 9.4*   HCT 30.2* 28.6* 28.3*   MCV 86.0 86.7 87.9   MCH 29.6 29.4 29.2   MCHC 34.4 33.9 33.2   RDW 12.3 12.8 12.9   NEPRELIM  --  9.37*  --    WBC 7.9 11.0 11.4*   .0* 130.0* 106.0*         No results for input(s): \"TROP\", \"TROPHS\", \"CK\" in the last 168 hours.    Review of Systems   Cardiovascular: Negative.    Respiratory: Negative.         Physical Exam:    Gen: alert, oriented x 3, NAD  Heent: pupils equal, reactive. Mucous membranes moist.   Neck: no jvd  Cardiac: regular rate and rhythm, normal S1,S2, soft murmur  Lungs: fine bibasilar crackles, on nc  Abd: soft, NT/ND +bs  Ext: trace ble edema  Skin: Warm, dry. Sternum cdi, steri strips.  Neuro: No focal  deficits        Medications:     insulin aspart  1-10 Units Subcutaneous TID AC and HS    furosemide  40 mg Intravenous Daily    enalapril  5 mg Oral Daily    amiodarone  400 mg Oral Daily    Followed by    [START ON 1/14/2025] amiodarone  200 mg Oral Daily    chlorhexidine gluconate  15 mL Mouth/Throat BID@0800,2000    aspirin  81 mg Oral BID    rosuvastatin  10 mg Oral Nightly    methIMAzole  10 mg Oral QAM    And    methIMAzole  15 mg Oral QPM    sennosides  8.6 mg Oral BID    docusate sodium  100 mg Oral BID    metoprolol tartrate  12.5 mg Oral 2x Daily(Beta Blocker)    mupirocin  1 Application Nasal BID    pantoprazole  40 mg Intravenous QAM AC    Or    pantoprazole  40 mg Oral QAM AC      sodium chloride 20 mL/hr at 01/07/25 0400    DOBUTamine Stopped (01/07/25 0600)    nitroGLYCERIN in dextrose 5% Stopped (01/07/25 0800)    NitroPRUSSide (Nipride) 50 mg in dextrose 5% 250 mL infusion      sodium chloride 10 mL/hr at 01/06/25 1210    HYDROmorphone in sodium chloride 0.9%      insulin regular Stopped (01/07/25 0800)           Assessment:  Pod 2 s/p AVR 23mm inspiris bioprosthesis, CABG x1 (svg-om), surgical maze and removal of AICHA due to severe, bicuspid AV stenosis.--1/6/25  LVEF 65%  Asa, statin, bb  Hx PAF, maintaining NSR  S/p RF PVI, CTA 1/24/24  Reportedly was supposed to stop eliquis in August. To remain off for now.   Intermittent accelerated junctional.   EP evaluation 1/7-limited course of amio given hyperthyroid-->amio 400mg po x7 days, then 200mg po x7 days, then stop  Tsh/t4 1/7 and then in 7 days.   HTN-controlled  Bb, acei.   Post op vol OL  Expected post op anemia/tcp-overall stable.   Hyperthyroid on methimazole  Tsh low, t4 and free t3 normal.   HL-statin  Leukocytosis, likely reactive, afebrile.     Plan:  Good UO, but not achieving negative fluid balance. Agree with increasing diuretics today. Renal function remains stable  Cont asa, statin, bb, acei.   Amio taper as outlined by Dr. Turner.    IS, ambulation.   Ok to transfer to tele from cardiology standpoint when ok with CV    Plan of care discussed with patient, RN, CV surgery.    Suzy Almanza, APRN  1/8/2025  8:42 AM    Seen and examined. MDM per me. Day 2 post AVR/CABG/AICHA excisona nd [place] doing quite well. Continue to diures. Afib now sinus on amio. Watch fro bradyarrythmia. To tele today.

## 2025-01-08 NOTE — PROGRESS NOTES
[facility]   part of [facility]     CV Surgery Progress Note    Lopez Miranda Patient Status:  Inpatient    1955 MRN [id number]   Location [facility] 6NE-A Attending Howie Diaz MD   Hosp Day # 2 PCP Maria Elena Serrano MD     Subjective:  Patient sitting in chair, reports feeling good. Pain is controlled. Denies any dyspnea. Tolerating diet. Ambulating well.     Tele: SR     Objective:  /72   Pulse 75   Temp 98 °F (36.7 °C) (Temporal)   Resp 21   Ht 5' 11\" (1.803 m)   Wt 221 lb 5.5 oz (100.4 kg)   SpO2 96%   BMI 30.87 kg/m²     Intake/Output:    Intake/Output Summary (Last 24 hours) at 2025 0826  Last data filed at 2025 0742  Gross per 24 hour   Intake 1552 ml   Output 1465 ml   Net 87 ml       Labs:  Lab Results   Component Value Date    WBC 11.4 2025    RBC 3.22 2025    HGB 9.4 2025    HCT 28.3 2025    MCV 87.9 2025    MCH 29.2 2025    MCHC 33.2 2025    RDW 12.9 2025    .0 2025     Lab Results   Component Value Date     2025    K 3.9 2025     2025    CO2 28.0 2025    BUN 12 2025    CREATSERUM 0.85 2025     2025    CA 8.9 2025     Lab Results   Component Value Date    INR 1.34 (H) 2025    INR 1.46 (H) 2025    INR 1.19 2024         Physical Exam:  General: VSS, A&Ox3, In NAD  Neck: No JVD.   Lungs: diminished bases   Heart: S1,S2 RRR. Sternum Stable   Abdomen: Soft, non-tender  Extremities: Warm, dry, no edema  Skin: sternotomy incision C/D/I, LE incision C/D/I  Neuro: no focal deficits     Assessment/Plan:   S/P AVR with a 23mm Inspiris bovine pericardial valve, CABGx1 with SVG-OM, EnCompass MAZE, and amputation of AICHA POD#2  -Hypertension, controlled on po meds  -Hx of Afib, currently SR- short course po amio   -Hyperthyroid, on methimazole   -Acute post op blood loss anemia, expected, asymptomatic- monitor   -TCP, likely  2/2 consumption- monitor   -Volume OL, weight up from baseline- increase diuretic   -Renal function intact, good UOP  -Bowel regimen   -Pain management, prn norco   -Chest tubes removed   -Keep PW for now   -GI PPX: protonix   -DVT PPX: TEDs/SCDs  -Encourage IS/ambulation   -PT/OT/Cardiac rehab   -Ok to transfer to CTU     -D/W Dr. Emily Elizondo PA-C   Cardiothoracic Surgery   1/8/2025  8:43 AM

## 2025-01-08 NOTE — PLAN OF CARE
Assumed care of pt this am, he is sitting up in chair with c/o pain to sternum, PCA transitioned to PO mediation and given as ordered. Pt able to ambulate in [place] with standby assist and walker. Transfer to CTU8 order recvd. Awaiting bed.

## 2025-01-09 ENCOUNTER — APPOINTMENT (OUTPATIENT)
Dept: CV DIAGNOSTICS | Facility: HOSPITAL | Age: 70
End: 2025-01-09
Attending: PHYSICIAN ASSISTANT
Payer: MEDICARE

## 2025-01-09 ENCOUNTER — APPOINTMENT (OUTPATIENT)
Dept: GENERAL RADIOLOGY | Facility: HOSPITAL | Age: 70
End: 2025-01-09
Attending: PHYSICIAN ASSISTANT
Payer: MEDICARE

## 2025-01-09 LAB
ANION GAP SERPL CALC-SCNC: 7 MMOL/L (ref 0–18)
ATRIAL RATE: 64 BPM
BLOOD TYPE BARCODE: 6200
BUN BLD-MCNC: 12 MG/DL (ref 9–23)
CALCIUM BLD-MCNC: 8.8 MG/DL (ref 8.7–10.4)
CHLORIDE SERPL-SCNC: 101 MMOL/L (ref 98–112)
CO2 SERPL-SCNC: 30 MMOL/L (ref 21–32)
CREAT BLD-MCNC: 0.75 MG/DL
EGFRCR SERPLBLD CKD-EPI 2021: 98 ML/MIN/1.73M2 (ref 60–?)
ERYTHROCYTE [DISTWIDTH] IN BLOOD BY AUTOMATED COUNT: 12.4 %
GLUCOSE BLD-MCNC: 113 MG/DL (ref 70–99)
GLUCOSE BLD-MCNC: 113 MG/DL (ref 70–99)
GLUCOSE BLD-MCNC: 121 MG/DL (ref 70–99)
GLUCOSE BLD-MCNC: 126 MG/DL (ref 70–99)
GLUCOSE BLD-MCNC: 132 MG/DL (ref 70–99)
HCT VFR BLD AUTO: 26.4 %
HGB BLD-MCNC: 8.8 G/DL
MCH RBC QN AUTO: 28.9 PG (ref 26–34)
MCHC RBC AUTO-ENTMCNC: 33.3 G/DL (ref 31–37)
MCV RBC AUTO: 86.6 FL
OSMOLALITY SERPL CALC.SUM OF ELEC: 287 MOSM/KG (ref 275–295)
PLATELET # BLD AUTO: 112 10(3)UL (ref 150–450)
POTASSIUM SERPL-SCNC: 3.5 MMOL/L (ref 3.5–5.1)
Q-T INTERVAL: 452 MS
QRS DURATION: 126 MS
QTC CALCULATION (BEZET): 494 MS
R AXIS: 76 DEGREES
RBC # BLD AUTO: 3.05 X10(6)UL
SODIUM SERPL-SCNC: 138 MMOL/L (ref 136–145)
T AXIS: 0 DEGREES
UNIT VOLUME: 350 ML
VENTRICULAR RATE: 72 BPM
WBC # BLD AUTO: 9.1 X10(3) UL (ref 4–11)

## 2025-01-09 PROCEDURE — 71045 X-RAY EXAM CHEST 1 VIEW: CPT | Performed by: PHYSICIAN ASSISTANT

## 2025-01-09 PROCEDURE — 93325 DOPPLER ECHO COLOR FLOW MAPG: CPT | Performed by: PHYSICIAN ASSISTANT

## 2025-01-09 PROCEDURE — 99232 SBSQ HOSP IP/OBS MODERATE 35: CPT | Performed by: INTERNAL MEDICINE

## 2025-01-09 PROCEDURE — 93321 DOPPLER ECHO F-UP/LMTD STD: CPT | Performed by: PHYSICIAN ASSISTANT

## 2025-01-09 PROCEDURE — 93308 TTE F-UP OR LMTD: CPT | Performed by: PHYSICIAN ASSISTANT

## 2025-01-09 RX ORDER — ACETAMINOPHEN 500 MG
1000 TABLET ORAL EVERY 6 HOURS PRN
Status: DISCONTINUED | OUTPATIENT
Start: 2025-01-09 | End: 2025-01-10

## 2025-01-09 RX ORDER — ACETAMINOPHEN 500 MG
500 TABLET ORAL EVERY 6 HOURS PRN
Status: DISCONTINUED | OUTPATIENT
Start: 2025-01-09 | End: 2025-01-10

## 2025-01-09 NOTE — PROGRESS NOTES
Progress Note  Lopez Miranda Patient Status:  Inpatient    1955 MRN [id number]   Location [facility] 8NE-A Attending Howie Diaz MD   Hosp Day # 3 PCP Maria Elena Serrano MD     Subjective:  Feeling good. No complaints of chest pain or SOB    Objective:  /66 (BP Location: Left arm)   Pulse 82   Temp 98.3 °F (36.8 °C) (Oral)   Resp 22   Ht 5' 11\" (1.803 m)   Wt 219 lb 2.2 oz (99.4 kg)   SpO2 90%   BMI 30.56 kg/m²     Telemetry: SR      Intake/Output: +4572        Last 3 Weights   25 0505 219 lb 2.2 oz (99.4 kg)   25 0355 221 lb 5.5 oz (100.4 kg)   25 0500 223 lb 1.7 oz (101.2 kg)   25 0600 211 lb 10.3 oz (96 kg)   24 0838 215 lb (97.5 kg)   24 1035 210 lb (95.3 kg)   24 1356 216 lb 8 oz (98.2 kg)       Labs:  Recent Labs   Lab 257 25  0454 25  045   * 143* 113*   BUN 10 12 12   CREATSERUM 0.74 0.85 0.75   EGFRCR 99 95 98   CA 9.0 8.9 8.8    139 138   K 3.9 3.9 3.5    104 101   CO2 22.0 28.0 30.0     Recent Labs   Lab 257 254 25  0457   RBC 3.30* 3.22* 3.05*   HGB 9.7* 9.4* 8.8*   HCT 28.6* 28.3* 26.4*   MCV 86.7 87.9 86.6   MCH 29.4 29.2 28.9   MCHC 33.9 33.2 33.3   RDW 12.8 12.9 12.4   NEPRELIM 9.37*  --   --    WBC 11.0 11.4* 9.1   .0* 106.0* 112.0*         No results for input(s): \"TROP\", \"TROPHS\", \"CK\" in the last 168 hours.  Lab Results   Component Value Date    INR 1.34 (H) 2025    INR 1.46 (H) 2025    INR 1.19 2024       Diagnostics:     Review of Systems   Constitutional: Negative.   Cardiovascular: Negative.    Respiratory: Negative.         Physical Exam:    Gen: Alert, oriented x 3, in no apparent distress  Heent: Pupils equal, reactive. Mucous membranes moist.   Cardiac: Regular rate and rhythm, normal S1,S2  Lungs: Clear to auscultation  Abd: Soft, non tender, non distended  Ext: BLE edema  Skin: Warm, dry          Medications:      furosemide  40 mg Intravenous BID (Diuretic)    insulin aspart  1-10 Units Subcutaneous TID AC and HS    enalapril  5 mg Oral Daily    amiodarone  400 mg Oral Daily    Followed by    [START ON 1/14/2025] amiodarone  200 mg Oral Daily    chlorhexidine gluconate  15 mL Mouth/Throat BID@0800,2000    aspirin  81 mg Oral BID    rosuvastatin  10 mg Oral Nightly    methIMAzole  10 mg Oral QAM    And    methIMAzole  15 mg Oral QPM    sennosides  8.6 mg Oral BID    docusate sodium  100 mg Oral BID    metoprolol tartrate  12.5 mg Oral 2x Daily(Beta Blocker)    mupirocin  1 Application Nasal BID    pantoprazole  40 mg Intravenous QAM AC    Or    pantoprazole  40 mg Oral QAM AC             Assessment:  Pod 3 s/p AVR 23mm inspiris bioprosthesis, CABG x1 (svg-om), surgical maze and removal of AICHA due to severe, bicuspid AV stenosis.--1/6/25  LVEF 65%  Asa, statin, bb  Paroxysmal atrial fibrillation,  maintaining NSR  S/p RF PVI, CTA 1/24/24  Reportedly was supposed to stop eliquis in August. To remain off for now.   Intermittent accelerated junctional.   EP evaluation 1/7-limited course of amio given hyperthyroid-->amio 400mg po x7 days, then 200mg po x7 days, then stop  Tsh/t4 1/7 and then in 7 days.   /66  Bb, ACE  Post op vol OL   positive 4 liters and 8 pounds  Diuresing with IV lasix 40 mg BID  Expected post op anemia/tcp-overall stable - 8.8/112  Hyperthyroid on methimazole  Tsh low, t4 and free t3 normal.   Hyperlipidemia - LDL 93, on statin   Leukocytosis, likely reactive, afebrile - resolved    Plan:  Continue to diurese with IV lasix 40 mg Bid. Follow I/o, weights, BMP  Continue ASA, BB, statin, ACE and amiodarone taper  Encourage ambulation    Plan of care discussed with patient, RN.    BRUCE Baez  1/9/2025  1:11 PM    Patient seen and examined    Patient is feeling much better.  He has very little discomfort in his chest.  No continue to diurese as noted.  Echo is pending.  Will continue with ACE  inhibitor's beta-blockers and statin.  Encouraged him to stay active.    Juan Francisco Ahn MD FACC  L3

## 2025-01-09 NOTE — PROGRESS NOTES
[facility]   part of [facility]     CV Surgery Progress Note    Lopez Miranda Patient Status:  Inpatient    1955 MRN [id number]   Location [facility] 6NE-A Attending Howie Diaz MD   Hosp Day # 3 PCP Maria Elena Serrano MD     Subjective:  Patient reports feeling pretty good. Pain is controlled. Denies any dyspnea.       Objective:  /75 (BP Location: Left arm)   Pulse 84   Temp 98.6 °F (37 °C) (Oral)   Resp 22   Ht 5' 11\" (1.803 m)   Wt 219 lb 2.2 oz (99.4 kg)   SpO2 92%   BMI 30.56 kg/m²     Intake/Output:    Intake/Output Summary (Last 24 hours) at 2025  Last data filed at 2025 0522  Gross per 24 hour   Intake 1080 ml   Output 1550 ml   Net -470 ml       Labs:  Lab Results   Component Value Date    WBC 9.1 2025    RBC 3.05 2025    HGB 8.8 2025    HCT 26.4 2025    MCV 86.6 2025    MCH 28.9 2025    MCHC 33.3 2025    RDW 12.4 2025    .0 2025     Lab Results   Component Value Date     2025    K 3.5 2025     2025    CO2 30.0 2025    BUN 12 2025    CREATSERUM 0.75 2025     2025    CA 8.8 2025     Lab Results   Component Value Date    INR 1.34 (H) 2025    INR 1.46 (H) 2025    INR 1.19 2024         Physical Exam:  General: VSS, A&Ox3, In NAD  Neck: No JVD.   Lungs: diminished bases   Heart: S1,S2 RRR. Sternum Stable   Abdomen: Soft, non-tender  Extremities: Warm, dry, no edema  Skin: sternotomy incision C/D/I, LE incision C/D/I  Neuro: no focal deficits     Assessment/Plan:   S/P AVR with a 23mm Inspiris bovine pericardial valve, CABGx1 with SVG-OM, EnCompass MAZE, and amputation of AICHA POD#3  -Hypertension, controlled on po meds  -Hx of Afib, currently SR- short course po amio   -Hyperthyroid, on methimazole   -Acute post op blood loss anemia, expected, asymptomatic- monitor   -TCP, likely 2/2 consumption, stable- monitor    -Volume OL, weight up from baseline- continue diuresis   -Renal function intact, good UOP  -Bowel regimen   -Pain management, prn norco   -Chest tubes removed   -Discontinue PW  -GI PPX: protonix   -DVT PPX: TEDs/SCDs  -Encourage IS/ambulation   -PT/OT/Cardiac rehab   -Anticipate discharge in 1-2 days      -D/W Dr. Emily Elizondo, PA-C   Cardiothoracic Surgery   1/9/2025  9:05 AM

## 2025-01-09 NOTE — CM/SW NOTE
01/09/25 1227   Choice of Post-Acute Provider   Informed patient of right to choose their preferred provider Yes   List of appropriate post-acute services provided to patient/family with quality data Yes   Patient/family choice [facility]   Information given to Patient       SW met with pt at bedside - agreeable to [facility] at discharge.     [facility]  Phone # [phone]  Fax # [phone]    Cathy JOHNSON, LSW  Discharge Planner

## 2025-01-09 NOTE — PROGRESS NOTES
Called by patient's nurse that patient had a period of hypoxia while sleeping down, SpO2 to mid-60s. He recovered with addition of O2 @ 5L back to mid-90s. He feels well now, in no distress. /73, HR is SR @ 84.   Reviewed with Dr. Olmstead, will get STAT TTE and CXR. Give evening dose of diuretic now.

## 2025-01-09 NOTE — PLAN OF CARE
Pt denies c/o pain, malaise or cardiac symptoms. A&Ox4. Lungs diminished bilaterally with equal expansion, on room air. Pt NSR on monitor with regular rate. Abdomen soft and non-tender with hypoactive bowel sounds in all four quadrants. Continent of B&B. Mid-sternal incision, LLE incision, clean and dry with steri strips, no complications. Pacer wires protected. Skin check with Edelmira ROSENBAUM. Pt updated with plan of care.    Problem: PAIN - ADULT  Goal: Verbalizes/displays adequate comfort level or patient's stated pain goal  Description: INTERVENTIONS:  - Encourage pt to monitor pain and request assistance  - Assess pain using appropriate pain scale  - Administer analgesics based on type and severity of pain and evaluate response  - Implement non-pharmacological measures as appropriate and evaluate response  - Consider cultural and social influences on pain and pain management  - Manage/alleviate anxiety  - Utilize distraction and/or relaxation techniques  - Monitor for opioid side effects  - Notify MD/LIP if interventions unsuccessful or patient reports new pain  - Anticipate increased pain with activity and pre-medicate as appropriate  Outcome: Progressing     Problem: RISK FOR INFECTION - ADULT  Goal: Absence of fever/infection during anticipated neutropenic period  Description: INTERVENTIONS  - Monitor WBC  - Administer growth factors as ordered  - Implement neutropenic guidelines  Outcome: Progressing     Problem: SAFETY ADULT - FALL  Goal: Free from fall injury  Description: INTERVENTIONS:  - Assess pt frequently for physical needs  - Identify cognitive and physical deficits and behaviors that affect risk of falls.  - [place] fall precautions as indicated by assessment.  - Educate pt/family on patient safety including physical limitations  - Instruct pt to call for assistance with activity based on assessment  - Modify environment to reduce risk of injury  - Provide assistive devices as appropriate  - Consider  OT/PT consult to assist with strengthening/mobility  - Encourage toileting schedule  Outcome: Progressing     Problem: DISCHARGE PLANNING  Goal: Discharge to home or other facility with appropriate resources  Description: INTERVENTIONS:  - Identify barriers to discharge w/pt and caregiver  - Include patient/family/discharge partner in discharge planning  - Arrange for needed discharge resources and transportation as appropriate  - Identify discharge learning needs (meds, wound care, etc)  - Arrange for interpreters to assist at discharge as needed  - Consider post-discharge preferences of patient/family/discharge partner  - Complete POLST form as appropriate  - Assess patient's ability to be responsible for managing their own health  - Refer to Case Management Department for coordinating discharge planning if the patient needs post-hospital services based on physician/LIP order or complex needs related to functional status, cognitive ability or social support system  Outcome: Progressing     Problem: CARDIOVASCULAR - ADULT  Goal: Maintains optimal cardiac output and hemodynamic stability  Description: INTERVENTIONS:  - Monitor vital signs, rhythm, and trends  - Monitor for bleeding, hypotension and signs of decreased cardiac output  - Evaluate effectiveness of vasoactive medications to optimize hemodynamic stability  - Monitor arterial and/or venous puncture sites for bleeding and/or hematoma  - Assess quality of pulses, skin color and temperature  - Assess for signs of decreased coronary artery perfusion - ex. Angina  - Evaluate fluid balance, assess for edema, trend weights  Outcome: Progressing  Goal: Absence of cardiac arrhythmias or at baseline  Description: INTERVENTIONS:  - Continuous cardiac monitoring, monitor vital signs, obtain 12 lead EKG if indicated  - Evaluate effectiveness of antiarrhythmic and heart rate control medications as ordered  - Initiate emergency measures for life threatening  arrhythmias  - Monitor electrolytes and administer replacement therapy as ordered  Outcome: Progressing

## 2025-01-09 NOTE — PLAN OF CARE
Pt received at 1930. A&Ox4. Requiring 3-4L o2 weaning as tolerated. Lungs diminished slight crackles auscultated bilaterally. Pt denies sob. NSR on tele monitor. Sternal incision and x1 LLE donor site c/d/I painted with betadine. Pacewires in place secured with dressing. SCDs/teds. Continent b&b, small bm this evening. Pain controlled with prn pain medication. Ambulating with standby assist. Pt updated and agrees with plan of care. Frequent rounding and fall precautions in place.     Problem: CARDIOVASCULAR - ADULT  Goal: Maintains optimal cardiac output and hemodynamic stability  Description: INTERVENTIONS:  - Monitor vital signs, rhythm, and trends  - Monitor for bleeding, hypotension and signs of decreased cardiac output  - Evaluate effectiveness of vasoactive medications to optimize hemodynamic stability  - Monitor arterial and/or venous puncture sites for bleeding and/or hematoma  - Assess quality of pulses, skin color and temperature  - Assess for signs of decreased coronary artery perfusion - ex. Angina  - Evaluate fluid balance, assess for edema, trend weights  Outcome: Progressing  Goal: Absence of cardiac arrhythmias or at baseline  Description: INTERVENTIONS:  - Continuous cardiac monitoring, monitor vital signs, obtain 12 lead EKG if indicated  - Evaluate effectiveness of antiarrhythmic and heart rate control medications as ordered  - Initiate emergency measures for life threatening arrhythmias  - Monitor electrolytes and administer replacement therapy as ordered  Outcome: Progressing     Problem: CARDIOVASCULAR - ADULT  Goal: Maintains optimal cardiac output and hemodynamic stability  Description: INTERVENTIONS:  - Monitor vital signs, rhythm, and trends  - Monitor for bleeding, hypotension and signs of decreased cardiac output  - Evaluate effectiveness of vasoactive medications to optimize hemodynamic stability  - Monitor arterial and/or venous puncture sites for bleeding and/or hematoma  - Assess  quality of pulses, skin color and temperature  - Assess for signs of decreased coronary artery perfusion - ex. Angina  - Evaluate fluid balance, assess for edema, trend weights  Outcome: Progressing  Goal: Absence of cardiac arrhythmias or at baseline  Description: INTERVENTIONS:  - Continuous cardiac monitoring, monitor vital signs, obtain 12 lead EKG if indicated  - Evaluate effectiveness of antiarrhythmic and heart rate control medications as ordered  - Initiate emergency measures for life threatening arrhythmias  - Monitor electrolytes and administer replacement therapy as ordered  Outcome: Progressing

## 2025-01-09 NOTE — PHYSICAL THERAPY NOTE
PHYSICAL THERAPY TREATMENT NOTE - INPATIENT    Room Number: 8606/8606-A     Session: 1     Number of Visits to Meet Established Goals: 4    Presenting Problem: s/p 1.Aortic valve replacement with 23 mm Inspiris bovine pericardial valve.  2.Single saphenous vein graft to the obtuse marginal.  3.EnCompass radiofrequency maze.  4.Amputation of left atrial appendage.  1/6  Co-Morbidities : atrial fibrillation, aortic stenosis, CAD, hyperthyroidism, hypertension and hyperlipidemia    PHYSICAL THERAPY MEDICAL/SOCIAL HISTORY  History related to current admission: Patient is a 69 year old male admitted on 1/6/2025 : Presenting Problem: s/p 1.Aortic valve replacement with 23 mm Inspiris bovine pericardial valve.  2.Single saphenous vein graft to the obtuse marginal.  3.EnCompass radiofrequency maze.  4.Amputation of left atrial appendage.  1/6     HOME SITUATION  Type of Home: House  Home Layout: Two level                     Lives With: Alone    Drives: Yes           Prior Level of [place]: Indep, davila and , lives alone but kids live close and can assist PRN    PHYSICAL THERAPY ASSESSMENT   Patient is currently functioning near baseline with bed mobility, transfers, gait, and stair negotiation.    Patient currently does not meet criteria for skilled inpatient physical therapy services, however patient will continue to benefit from QID ambulation with nursing staff.  Pt is discharged from IP PT services.  RN aware to re-order if there is a decline in mobility status.      Patient continues to benefit from continued skilled PT services: at discharge to promote prior level of function and safety with additional support and return home with home health PT.    PLAN DURING HOSPITALIZATION  Nursing Mobility Recommendation : 1 Assist  PT Device Recommendation: Rolling walker  PT Treatment Plan: Bed mobility;Body mechanics;Coordination;Endurance;Energy conservation;Patient education;Family education;Gait  training;Neuromuscular re-educate;Range of motion;Strengthening;Stoop training;Stair training;Transfer training;Balance training  Frequency (Obs): 3-5x/week     CURRENT GOALS     Goal #1 Patient is able to demonstrate supine - sit EOB @ level: supervision      Goal #2 Patient is able to demonstrate transfers EOB to/from Chair/Wheelchair at assistance level: supervision      Goal #3 Patient is able to ambulate 150 feet with assist device: walker - rolling at assistance level: supervision      Goal #4 Patient will navigate stairs with rail and SBA   Goal #5 Patient will participate in CV binder HEP   Goal #6     Goal Comments: Goals established on 2025 all goals achieved at SBA level or above    SUBJECTIVE  \"I am doing great!    OBJECTIVE  Precautions: Sternal;Cardiac    WEIGHT BEARING RESTRICTION     PAIN ASSESSMENT   Ratin  Location: sternum  Management Techniques: Activity promotion;Body mechanics;Repositioning    BALANCE                                                                                                                       Static Sitting: Good  Dynamic Sitting: Good           Static Standing: Good  Dynamic Standing: Good    ACTIVITY TOLERANCE                         O2 WALK       AM-PAC '6-Clicks' INPATIENT SHORT FORM - BASIC MOBILITY  How much difficulty does the patient currently have...  Patient Difficulty: Turning over in bed (including adjusting bedclothes, sheets and blankets)?: None   Patient Difficulty: Sitting down on and standing up from a chair with arms (e.g., wheelchair, bedside commode, etc.): None   Patient Difficulty: Moving from lying on back to sitting on the side of the bed?: None   How much help from another person does the patient currently need...   Help from Another: Moving to and from a bed to a chair (including a wheelchair)?: None   Help from Another: Need to walk in hospital room?: None   Help from Another: Climbing 3-5 steps with a railing?: None      AM-PAC Score:  Raw Score: 24   Approx Degree of Impairment: 0%   Standardized Score (AM-PAC Scale): 61.14   CMS Modifier (G-Code): CH    FUNCTIONAL ABILITY STATUS  Gait Assessment   Functional Mobility/Gait Assessment  Gait Assistance: Independent  Distance (ft): 200  Assistive Device: None  Pattern: Within Functional Limits  Stairs: Stairs  How Many Stairs: 12  Device: 1 Rail  Assist: Modified independent  Pattern: Ascend and Descend  Ascend and Descend : Step to    Skilled Therapy Provided     Exercises Repetitions   UPPER EXTREMITY     Head turns 10   Hand pumps 10   Shoulder shrugs 10   Bicep Curls - Alternating 10   Shoulder Flexion > 90 degrees 10         LOWER EXTREMITY     Ankle pumps 10   Ankle circles 10   Heel Raises - bilateral 10   Partial Arc Quad - alternating 10   Seated Marching 10         CHEST EXERCISES     Trunk Rotation 10   Elbow Circles 10   Chest Fly's  10   Scapular Retraction 10         Therapist's Comments: up at macho in room - no device - stairs completed 1/9/2025     Patient End of Session: Up in chair;Needs met;Call light within reach;RN aware of session/findings;All patient questions and concerns addressed    PT Session Time: 10 minutes  Gait Training: 10 minutes  Therapeutic Activity: 0 minutes  Therapeutic Exercise: 0 minutes   Neuromuscular Re-education: 0 minutes

## 2025-01-09 NOTE — PLAN OF CARE
Pt denies c/o malaise or cardiac symptoms. A&Ox4. Lungs diminished with slight crackles bilaterally with equal expansion, on room air. Pt NSR on monitor with regular rate. Abdomen soft and non-tender with hypoactive bowel sounds in all four quadrants, small BM last night and this morning. Continent of B&B. Mid-sternal incision, LLE incision clean and dry, steri-strips, betadine, no complications. Pacer wires in place and protected. Pt updated with plan of care.    Approx 1447 this RN received call from monitor tech that SpO2 77. This RN entered patients room. Pt was trying to take a nap but was not fully asleep yet. This RN applied 4L O2 but sats droped to 67 with good pleth. This RN instructed patient to take long/deep breaths as able. 5L HF O2 applied and sats came up to 80s with good pleth then returned to 90s. This RN sent bubble chat to [facility] at 1508, message unread. This RN called [facility] at 1513, 1520x2, and 1523 with no answer. Called [facility] S[facility]ERS, cell phone number at 1524 with no answer and left message. Called CV coordinator at 1527 with no answer and left message. [facility] S[facility], called this RN back approx 1600 and this RN explained situation. PA called Dr. Olmstead and received orders. PA called this RN back at 1606 with orders for stat cxr and stat limited ECHO to rule out effusion. Orders entered.    Problem: PAIN - ADULT  Goal: Verbalizes/displays adequate comfort level or patient's stated pain goal  Description: INTERVENTIONS:  - Encourage pt to monitor pain and request assistance  - Assess pain using appropriate pain scale  - Administer analgesics based on type and severity of pain and evaluate response  - Implement non-pharmacological measures as appropriate and evaluate response  - Consider cultural and social influences on pain and pain management  - Manage/alleviate anxiety  - Utilize distraction and/or relaxation techniques  - Monitor for opioid side effects  - Notify MD/LIP if interventions  unsuccessful or patient reports new pain  - Anticipate increased pain with activity and pre-medicate as appropriate  Outcome: Progressing     Problem: RISK FOR INFECTION - ADULT  Goal: Absence of fever/infection during anticipated neutropenic period  Description: INTERVENTIONS  - Monitor WBC  - Administer growth factors as ordered  - Implement neutropenic guidelines  Outcome: Progressing     Problem: SAFETY ADULT - FALL  Goal: Free from fall injury  Description: INTERVENTIONS:  - Assess pt frequently for physical needs  - Identify cognitive and physical deficits and behaviors that affect risk of falls.  - Belle fall precautions as indicated by assessment.  - Educate pt/family on patient safety including physical limitations  - Instruct pt to call for assistance with activity based on assessment  - Modify environment to reduce risk of injury  - Provide assistive devices as appropriate  - Consider OT/PT consult to assist with strengthening/mobility  - Encourage toileting schedule  Outcome: Progressing     Problem: DISCHARGE PLANNING  Goal: Discharge to home or other facility with appropriate resources  Description: INTERVENTIONS:  - Identify barriers to discharge w/pt and caregiver  - Include patient/family/discharge partner in discharge planning  - Arrange for needed discharge resources and transportation as appropriate  - Identify discharge learning needs (meds, wound care, etc)  - Arrange for interpreters to assist at discharge as needed  - Consider post-discharge preferences of patient/family/discharge partner  - Complete POLST form as appropriate  - Assess patient's ability to be responsible for managing their own health  - Refer to Case Management Department for coordinating discharge planning if the patient needs post-hospital services based on physician/LIP order or complex needs related to functional status, cognitive ability or social support system  Outcome: Progressing     Problem: CARDIOVASCULAR -  ADULT  Goal: Maintains optimal cardiac output and hemodynamic stability  Description: INTERVENTIONS:  - Monitor vital signs, rhythm, and trends  - Monitor for bleeding, hypotension and signs of decreased cardiac output  - Evaluate effectiveness of vasoactive medications to optimize hemodynamic stability  - Monitor arterial and/or venous puncture sites for bleeding and/or hematoma  - Assess quality of pulses, skin color and temperature  - Assess for signs of decreased coronary artery perfusion - ex. Angina  - Evaluate fluid balance, assess for edema, trend weights  Outcome: Progressing  Goal: Absence of cardiac arrhythmias or at baseline  Description: INTERVENTIONS:  - Continuous cardiac monitoring, monitor vital signs, obtain 12 lead EKG if indicated  - Evaluate effectiveness of antiarrhythmic and heart rate control medications as ordered  - Initiate emergency measures for life threatening arrhythmias  - Monitor electrolytes and administer replacement therapy as ordered  Outcome: Progressing

## 2025-01-09 NOTE — PROGRESS NOTES
[facility]   part of [facility]     Hospitalist Progress Note     Lopez Miranda Patient Status:  Inpatient    1955 MRN [id number]   Location [facility] 6NE-A Attending Howie Diaz MD   Hosp Day # 3 PCP Maria Elena Serrano MD     Chief Complaint:  feeling well     Subjective:     Patient has mild CP. LE edema . No SOB.     Objective:    Review of Systems:   A comprehensive review of systems was completed; pertinent positive and negatives stated in subjective.    Vital signs:  Temp:  [97.9 °F (36.6 °C)-98.6 °F (37 °C)] 98.6 °F (37 °C)  Pulse:  [73-89] 89  Resp:  [17-32] 32  BP: (116-131)/(64-81) 126/75  SpO2:  [85 %-98 %] 92 %    Physical Exam:    General: No acute distress  Respiratory: No wheezes, no rhonchi  Cardiovascular: S1, S2, regular rate and rhythm  Abdomen: Soft, Non-tender, non-distended, positive bowel sounds  Neuro: No new focal deficits.   Extremities: + edema in legs, better today       Diagnostic Data:    Labs:  Recent Labs   Lab 25  1059 25  1208 257 25  0454 25   WBC  --  7.9 11.0 11.4* 9.1   HGB  --  10.4* 9.7* 9.4* 8.8*   MCV  --  86.0 86.7 87.9 86.6   PLT 76.0* 101.0* 130.0* 106.0* 112.0*   INR 1.46* 1.34*  --   --   --        Recent Labs   Lab 25  0417 25  04525   * 143* 113*   BUN 10 12 12   CREATSERUM 0.74 0.85 0.75   CA 9.0 8.9 8.8    139 138   K 3.9 3.9 3.5    104 101   CO2 22.0 28.0 30.0       Estimated Glomerular Filtration Rate: 98.3 mL/min/1.73m2 (by CKD-EPI based on SCr of 0.75 mg/dL).    No results for input(s): \"TROP\", \"TROPHS\", \"CK\" in the last 168 hours.    Recent Labs   Lab 25  1059 25  1208   PTP 17.9* 16.7*   INR 1.46* 1.34*          Microbiology    No results found for this visit on 25.      Imaging: Reviewed in Epic.    Medications:    furosemide  40 mg Intravenous BID (Diuretic)    insulin aspart  1-10 Units Subcutaneous TID AC and HS    enalapril   5 mg Oral Daily    amiodarone  400 mg Oral Daily    Followed by    [START ON 1/14/2025] amiodarone  200 mg Oral Daily    chlorhexidine gluconate  15 mL Mouth/Throat BID@0800,2000    aspirin  81 mg Oral BID    rosuvastatin  10 mg Oral Nightly    methIMAzole  10 mg Oral QAM    And    methIMAzole  15 mg Oral QPM    sennosides  8.6 mg Oral BID    docusate sodium  100 mg Oral BID    metoprolol tartrate  12.5 mg Oral 2x Daily(Beta Blocker)    mupirocin  1 Application Nasal BID    pantoprazole  40 mg Intravenous QAM AC    Or    pantoprazole  40 mg Oral QAM AC       Assessment & Plan:      #Stress hyperglycemia  -not diabetic  -Hba1c 5.1  - ISS     #Aortic stenosis with acute on chronic diastolic HF   #CAD  -s/p AVR,CABG, MAZE and AICHA 1/6/2025  -per CV Sx and cardiology  - diuresis per cardiology      # expected post op anemia and thrombocytopenia , consumptive, monitor     # PAF  - tele   - amiodarone     #Hyperthyroidism  -continue methimazole     #Hyperlipidemia  -statin    #Mild expected post op anemia  -trend    Dorita Guillen MD      Supplementary Documentation:     Quality:  DVT Mechanical Prophylaxis: JORDAN hose,      DVT Pharmacologic Prophylaxis   Medication   None      DVT Pharmacologic prophylaxis: Aspirin 162 mg         Code Status: Not on file  Rivers: No urinary catheter in place  Rivers Duration (in days):   Central line:    SHERRY: 1/10/2025    Discharge is dependent on: progress  At this point Mr. Miranda is expected to be discharge to: home    The 21st Century Cures Act makes medical notes like these available to patients in the interest of transparency. Please be advised this is a medical document. Medical documents are intended to carry relevant information, facts as evident, and the clinical opinion of the practitioner. The medical note is intended as peer to peer communication and may appear blunt or direct. It is written in medical language and may contain abbreviations or verbiage that are unfamiliar.

## 2025-01-09 NOTE — CARDIAC REHAB
Cardiac rehab education completed with patient and support person  Discharge video shown  Patient referred to cardiac rehab  Patient lives closest to [facility]--gave number in binder

## 2025-01-10 VITALS
BODY MASS INDEX: 30.19 KG/M2 | SYSTOLIC BLOOD PRESSURE: 102 MMHG | TEMPERATURE: 98 F | HEART RATE: 85 BPM | DIASTOLIC BLOOD PRESSURE: 73 MMHG | RESPIRATION RATE: 19 BRPM | HEIGHT: 71 IN | OXYGEN SATURATION: 98 % | WEIGHT: 215.63 LBS

## 2025-01-10 LAB
ANION GAP SERPL CALC-SCNC: 10 MMOL/L (ref 0–18)
BUN BLD-MCNC: 13 MG/DL (ref 9–23)
CALCIUM BLD-MCNC: 9.1 MG/DL (ref 8.7–10.4)
CHLORIDE SERPL-SCNC: 100 MMOL/L (ref 98–112)
CO2 SERPL-SCNC: 28 MMOL/L (ref 21–32)
CREAT BLD-MCNC: 0.81 MG/DL
EGFRCR SERPLBLD CKD-EPI 2021: 96 ML/MIN/1.73M2 (ref 60–?)
ERYTHROCYTE [DISTWIDTH] IN BLOOD BY AUTOMATED COUNT: 12.3 %
GLUCOSE BLD-MCNC: 109 MG/DL (ref 70–99)
GLUCOSE BLD-MCNC: 113 MG/DL (ref 70–99)
GLUCOSE BLD-MCNC: 117 MG/DL (ref 70–99)
GLUCOSE BLD-MCNC: 145 MG/DL (ref 70–99)
HCT VFR BLD AUTO: 28.8 %
HGB BLD-MCNC: 9.9 G/DL
MCH RBC QN AUTO: 29 PG (ref 26–34)
MCHC RBC AUTO-ENTMCNC: 34.4 G/DL (ref 31–37)
MCV RBC AUTO: 84.5 FL
OSMOLALITY SERPL CALC.SUM OF ELEC: 287 MOSM/KG (ref 275–295)
PLATELET # BLD AUTO: 198 10(3)UL (ref 150–450)
POTASSIUM SERPL-SCNC: 3.2 MMOL/L (ref 3.5–5.1)
POTASSIUM SERPL-SCNC: 3.4 MMOL/L (ref 3.5–5.1)
RBC # BLD AUTO: 3.41 X10(6)UL
SODIUM SERPL-SCNC: 138 MMOL/L (ref 136–145)
WBC # BLD AUTO: 10.6 X10(3) UL (ref 4–11)

## 2025-01-10 PROCEDURE — 99231 SBSQ HOSP IP/OBS SF/LOW 25: CPT | Performed by: INTERNAL MEDICINE

## 2025-01-10 RX ORDER — AMIODARONE HYDROCHLORIDE 200 MG/1
TABLET ORAL
Qty: 15 TABLET | Refills: 0 | Status: SHIPPED | OUTPATIENT
Start: 2025-01-11 | End: 2025-01-22

## 2025-01-10 RX ORDER — POTASSIUM CHLORIDE 1500 MG/1
40 TABLET, EXTENDED RELEASE ORAL EVERY 4 HOURS
Status: COMPLETED | OUTPATIENT
Start: 2025-01-10 | End: 2025-01-10

## 2025-01-10 RX ORDER — FUROSEMIDE 40 MG/1
40 TABLET ORAL DAILY
Qty: 7 TABLET | Refills: 0 | Status: SHIPPED | OUTPATIENT
Start: 2025-01-10 | End: 2025-01-17

## 2025-01-10 RX ORDER — HYDROCODONE BITARTRATE AND ACETAMINOPHEN 5; 325 MG/1; MG/1
1 TABLET ORAL EVERY 6 HOURS PRN
Qty: 30 TABLET | Refills: 0 | Status: SHIPPED | OUTPATIENT
Start: 2025-01-10

## 2025-01-10 RX ORDER — FUROSEMIDE 10 MG/ML
40 INJECTION INTRAMUSCULAR; INTRAVENOUS DAILY
Status: DISCONTINUED | OUTPATIENT
Start: 2025-01-10 | End: 2025-01-10

## 2025-01-10 NOTE — PLAN OF CARE
Post CABG day #4.  Pt is AOX4.  VSS, afebrile and c/o mild incision site pain. SpO2 maintained on RA. . Denies any SOB, cough. IS-1250. Tele-NSR/ AJR. PO Aspirin. Cardiac control  diet/QID ACCU check .  Denies any n/v/d. Voids. Up with macho. Pt walked in the hallway.  Will continue to monitor. Pt is updated with plan of care.        Problem: PAIN - ADULT  Goal: Verbalizes/displays adequate comfort level or patient's stated pain goal  Description: INTERVENTIONS:  - Encourage pt to monitor pain and request assistance  - Assess pain using appropriate pain scale  - Administer analgesics based on type and severity of pain and evaluate response  - Implement non-pharmacological measures as appropriate and evaluate response  - Consider cultural and social influences on pain and pain management  - Manage/alleviate anxiety  - Utilize distraction and/or relaxation techniques  - Monitor for opioid side effects  - Notify MD/LIP if interventions unsuccessful or patient reports new pain  - Anticipate increased pain with activity and pre-medicate as appropriate  Outcome: Progressing     Problem: RISK FOR INFECTION - ADULT  Goal: Absence of fever/infection during anticipated neutropenic period  Description: INTERVENTIONS  - Monitor WBC  - Administer growth factors as ordered  - Implement neutropenic guidelines  Outcome: Progressing     Problem: DISCHARGE PLANNING  Goal: Discharge to home or other facility with appropriate resources  Description: INTERVENTIONS:  - Identify barriers to discharge w/pt and caregiver  - Include patient/family/discharge partner in discharge planning  - Arrange for needed discharge resources and transportation as appropriate  - Identify discharge learning needs (meds, wound care, etc)  - Arrange for interpreters to assist at discharge as needed  - Consider post-discharge preferences of patient/family/discharge partner  - Complete POLST form as appropriate  - Assess patient's ability to be responsible for  managing their own health  - Refer to Case Management Department for coordinating discharge planning if the patient needs post-hospital services based on physician/LIP order or complex needs related to functional status, cognitive ability or social support system  Outcome: Progressing     Problem: CARDIOVASCULAR - ADULT  Goal: Maintains optimal cardiac output and hemodynamic stability  Description: INTERVENTIONS:  - Monitor vital signs, rhythm, and trends  - Monitor for bleeding, hypotension and signs of decreased cardiac output  - Evaluate effectiveness of vasoactive medications to optimize hemodynamic stability  - Monitor arterial and/or venous puncture sites for bleeding and/or hematoma  - Assess quality of pulses, skin color and temperature  - Assess for signs of decreased coronary artery perfusion - ex. Angina  - Evaluate fluid balance, assess for edema, trend weights  Outcome: Progressing  Goal: Absence of cardiac arrhythmias or at baseline  Description: INTERVENTIONS:  - Continuous cardiac monitoring, monitor vital signs, obtain 12 lead EKG if indicated  - Evaluate effectiveness of antiarrhythmic and heart rate control medications as ordered  - Initiate emergency measures for life threatening arrhythmias  - Monitor electrolytes and administer replacement therapy as ordered  Outcome: Progressing

## 2025-01-10 NOTE — PLAN OF CARE
Patient received at 1930. A&Ox4. Weaned to room air at start of shift and tolerating without requiring o2. Denies sob, bilateral lungs diminished. NSR on telemetry monitor. Sternal incision and x1 LLE donor site c/d/I, painted with betadine. Teds/scds on. Continent b&b, 2 bms on day shift 1/9/25. Pain controlled with prn pain medication. Ambulating with standby assist. Pt updated and agrees with plan of care. Frequent rounding and fall precautions in place.     Problem: CARDIOVASCULAR - ADULT  Goal: Maintains optimal cardiac output and hemodynamic stability  Description: INTERVENTIONS:  - Monitor vital signs, rhythm, and trends  - Monitor for bleeding, hypotension and signs of decreased cardiac output  - Evaluate effectiveness of vasoactive medications to optimize hemodynamic stability  - Monitor arterial and/or venous puncture sites for bleeding and/or hematoma  - Assess quality of pulses, skin color and temperature  - Assess for signs of decreased coronary artery perfusion - ex. Angina  - Evaluate fluid balance, assess for edema, trend weights  Outcome: Progressing  Goal: Absence of cardiac arrhythmias or at baseline  Description: INTERVENTIONS:  - Continuous cardiac monitoring, monitor vital signs, obtain 12 lead EKG if indicated  - Evaluate effectiveness of antiarrhythmic and heart rate control medications as ordered  - Initiate emergency measures for life threatening arrhythmias  - Monitor electrolytes and administer replacement therapy as ordered  Outcome: Progressing

## 2025-01-10 NOTE — CM/SW NOTE
[facility] notified of possible dc today.  AVS needs to be sent.    Annamaria Coreas LCSW  /Discharge Planner

## 2025-01-10 NOTE — PROGRESS NOTES
[facility]   part of [facility]     CV Surgery Progress Note    Lopez Miranda Patient Status:  Inpatient    1955 MRN [id number]   Location [facility] 6NE-A Attending Howie Diaz MD   Hosp Day # 4 PCP Maria Elena Serrano MD     Subjective:  Patient reports feeling good, ready to go home. Pain is controlled. Denies any dyspnea. On RA. Ambulating well.       Objective:  /77 (BP Location: Left arm)   Pulse 81   Temp 98.4 °F (36.9 °C) (Oral)   Resp 24   Ht 5' 11\" (1.803 m)   Wt 215 lb 9.8 oz (97.8 kg)   SpO2 90%   BMI 30.07 kg/m²     Intake/Output:    Intake/Output Summary (Last 24 hours) at 1/10/2025 0831  Last data filed at 1/10/2025 0402  Gross per 24 hour   Intake 1800 ml   Output 1550 ml   Net 250 ml       Labs:  Lab Results   Component Value Date    WBC 10.6 01/10/2025    RBC 3.41 01/10/2025    HGB 9.9 01/10/2025    HCT 28.8 01/10/2025    MCV 84.5 01/10/2025    MCH 29.0 01/10/2025    MCHC 34.4 01/10/2025    RDW 12.3 01/10/2025    .0 01/10/2025     Lab Results   Component Value Date     01/10/2025    K 3.2 01/10/2025     01/10/2025    CO2 28.0 01/10/2025    BUN 13 01/10/2025    CREATSERUM 0.81 01/10/2025     01/10/2025    CA 9.1 01/10/2025     Lab Results   Component Value Date    INR 1.34 (H) 2025    INR 1.46 (H) 2025    INR 1.19 2024         Physical Exam:  General: VSS, A&Ox3, In NAD  Neck: No JVD.   Lungs: diminished at bases   Heart: S1,S2 RRR. Sternum Stable   Abdomen: Soft, non-tender  Extremities: Warm, dry, no edema  Skin: sternotomy incision C/D/I, LE incision C/D/I  Neuro: no focal deficits     Assessment/Plan:   S/P AVR with a 23mm Inspiris bovine pericardial valve, CABGx1 with SVG-OM, EnCompass MAZE, and amputation of AICHA POD#4  -Hypertension, controlled on po meds  -Hx of Afib, currently SR- short course po amio   -Hyperthyroid, on methimazole   -Acute post op blood loss anemia, expected, asymptomatic, improved- monitor    -TCP, likely 2/2 consumption, resolved- monitor   -Volume OL, weight up from baseline- continue po diuresis as outpatient   -Episode of hypoxia while lying down, now on RA- CXR and echo reviewed by Dr. Olmstead  -Renal function intact, good UOP  -Bowel regimen   -Pain management, prn norco   -Chest tubes and PW removed   -GI PPX: protonix   -DVT PPX: TEDs/SCDs  -Encourage IS/ambulation   -PT/OT/Cardiac rehab   -Ok to discharge home today from surgical standpoint if ok with other services      -D/W Dr. Emily Elizondo PA-C   Cardiothoracic Surgery   1/10/2025  8:31 AM

## 2025-01-10 NOTE — PROGRESS NOTES
[facility]   part of [facility]     Hospitalist Progress Note     Lopez Miranda Patient Status:  Inpatient    1955 MRN [id number]   Location [facility] 6NE-A Attending Howie Diaz MD   Hosp Day # 4 PCP Maria Elena Serrano MD     Chief Complaint:  feeling well per patient     Subjective:     Patient ambulated and did well per RN, seemed SOB but not hypoxic. No CP/N/V/dizziness    Objective:    Review of Systems:   A comprehensive review of systems was completed; pertinent positive and negatives stated in subjective.    Vital signs:  Temp:  [97.8 °F (36.6 °C)-98.4 °F (36.9 °C)] 98 °F (36.7 °C)  Pulse:  [76-89] 82  Resp:  [14-33] 21  BP: ()/(66-77) 117/73  SpO2:  [67 %-95 %] 95 %    Physical Exam:    General: No acute distress  Respiratory: No wheezes, no rhonchi  Cardiovascular: S1, S2, regular rate and rhythm  Abdomen: Soft, Non-tender, non-distended, positive bowel sounds  Neuro: No new focal deficits.   Extremities: + edema in legs, improving       Diagnostic Data:    Labs:  Recent Labs   Lab 25  1059 25  1208 25  0417 25  0454 25  0457 01/10/25  0503   WBC  --  7.9 11.0 11.4* 9.1 10.6   HGB  --  10.4* 9.7* 9.4* 8.8* 9.9*   MCV  --  86.0 86.7 87.9 86.6 84.5   PLT 76.0* 101.0* 130.0* 106.0* 112.0* 198.0   INR 1.46* 1.34*  --   --   --   --        Recent Labs   Lab 25  0454 25  0457 01/10/25  0503   * 113* 113*   BUN 12 12 13   CREATSERUM 0.85 0.75 0.81   CA 8.9 8.8 9.1    138 138   K 3.9 3.5 3.2*    101 100   CO2 28.0 30.0 28.0       Estimated Glomerular Filtration Rate: 96 mL/min/1.73m2 (by CKD-EPI based on SCr of 0.81 mg/dL).    No results for input(s): \"TROP\", \"TROPHS\", \"CK\" in the last 168 hours.    Recent Labs   Lab 25  1059 25  1208   PTP 17.9* 16.7*   INR 1.46* 1.34*          Microbiology    No results found for this visit on 25.      Imaging: Reviewed in Epic.    Medications:    potassium chloride   40 mEq Oral Q4H    insulin aspart  1-10 Units Subcutaneous TID AC and HS    enalapril  5 mg Oral Daily    amiodarone  400 mg Oral Daily    Followed by    [START ON 1/14/2025] amiodarone  200 mg Oral Daily    chlorhexidine gluconate  15 mL Mouth/Throat BID@0800,2000    aspirin  81 mg Oral BID    rosuvastatin  10 mg Oral Nightly    methIMAzole  10 mg Oral QAM    And    methIMAzole  15 mg Oral QPM    sennosides  8.6 mg Oral BID    docusate sodium  100 mg Oral BID    metoprolol tartrate  12.5 mg Oral 2x Daily(Beta Blocker)    mupirocin  1 Application Nasal BID    pantoprazole  40 mg Oral QAM AC       Assessment & Plan:      #Stress hyperglycemia  -not diabetic  -Hba1c 5.1  - ISS     #Aortic stenosis with acute on chronic diastolic HF   #CAD  -s/p AVR,CABG, MAZE and AICHA 1/6/2025  -per CV Sx and cardiology  - diuresis per cardiology      # expected post op anemia and thrombocytopenia , consumptive, monitor     # PAF  - tele   - amiodarone     #Hyperthyroidism  -continue methimazole     #Hyperlipidemia  -statin    #Mild expected post op anemia  -trend    DC planning once cleared per [facility], cardiology.     Dorita Guillen MD      Supplementary Documentation:     Quality:  DVT Mechanical Prophylaxis: JORDAN hose,      DVT Pharmacologic Prophylaxis   Medication   None      DVT Pharmacologic prophylaxis: Aspirin 162 mg         Code Status: Not on file  Rivers: No urinary catheter in place  Rivers Duration (in days):   Central line:    SHERRY: 1/10/2025    Discharge is dependent on: progress  At this point Mr. Miranda is expected to be discharge to: home    The 21st Century Cures Act makes medical notes like these available to patients in the interest of transparency. Please be advised this is a medical document. Medical documents are intended to carry relevant information, facts as evident, and the clinical opinion of the practitioner. The medical note is intended as peer to peer communication and may appear blunt or direct. It is written  in medical language and may contain abbreviations or verbiage that are unfamiliar.

## 2025-01-10 NOTE — PROGRESS NOTES
Progress Note  Lopez Miranda Patient Status:  Inpatient    1955 MRN [id number]   Location [facility] 8NE-A Attending Howie Diaz MD   Hosp Day # 4 PCP Maria Elena Serrano MD     Subjective:  Feeling good. No complaints of chest pain or SOB    Objective:  /73 (BP Location: Left arm)   Pulse 85   Temp 97.9 °F (36.6 °C) (Oral)   Resp 19   Ht 5' 11\" (1.803 m)   Wt 215 lb 9.8 oz (97.8 kg)   SpO2 98%   BMI 30.07 kg/m²     Telemetry: SR      Intake/Output: +4762        Last 3 Weights   01/10/25 0401 215 lb 9.8 oz (97.8 kg)   25 0505 219 lb 2.2 oz (99.4 kg)   25 0355 221 lb 5.5 oz (100.4 kg)   25 0500 223 lb 1.7 oz (101.2 kg)   25 0600 211 lb 10.3 oz (96 kg)   24 0838 215 lb (97.5 kg)   24 1035 210 lb (95.3 kg)   24 1356 216 lb 8 oz (98.2 kg)       Labs:  Recent Labs   Lab 25  04525  0457 01/10/25  0503 01/10/25  1406   * 113* 113*  --    BUN 12 12 13  --    CREATSERUM 0.85 0.75 0.81  --    EGFRCR 95 98 96  --    CA 8.9 8.8 9.1  --     138 138  --    K 3.9 3.5 3.2* 3.4*    101 100  --    CO2 28.0 30.0 28.0  --      Recent Labs   Lab 25  0417 25  0454 25  0457 01/10/25  0503   RBC 3.30* 3.22* 3.05* 3.41*   HGB 9.7* 9.4* 8.8* 9.9*   HCT 28.6* 28.3* 26.4* 28.8*   MCV 86.7 87.9 86.6 84.5   MCH 29.4 29.2 28.9 29.0   MCHC 33.9 33.2 33.3 34.4   RDW 12.8 12.9 12.4 12.3   NEPRELIM 9.37*  --   --   --    WBC 11.0 11.4* 9.1 10.6   .0* 106.0* 112.0* 198.0         No results for input(s): \"TROP\", \"TROPHS\", \"CK\" in the last 168 hours.  Lab Results   Component Value Date    INR 1.34 (H) 2025    INR 1.46 (H) 2025    INR 1.19 2024       Diagnostics:     Review of Systems   Constitutional: Negative.   Cardiovascular: Negative.    Respiratory: Negative.         Physical Exam:    Gen: Alert, oriented x 3, in no apparent distress  Heent: Pupils equal, reactive. Mucous membranes moist.   Cardiac:  Regular rate and rhythm, normal S1,S2  Lungs: diminished bilaterally   Abd: Soft, non tender, non distended  Ext: 1+ BLE edema  Skin: Warm, dry          Medications:     furosemide  40 mg Intravenous Daily    potassium chloride  40 mEq Oral Q4H    insulin aspart  1-10 Units Subcutaneous TID AC and HS    enalapril  5 mg Oral Daily    amiodarone  400 mg Oral Daily    Followed by    [START ON 1/14/2025] amiodarone  200 mg Oral Daily    chlorhexidine gluconate  15 mL Mouth/Throat BID@0800,2000    aspirin  81 mg Oral BID    rosuvastatin  10 mg Oral Nightly    methIMAzole  10 mg Oral QAM    And    methIMAzole  15 mg Oral QPM    sennosides  8.6 mg Oral BID    docusate sodium  100 mg Oral BID    metoprolol tartrate  12.5 mg Oral 2x Daily(Beta Blocker)    mupirocin  1 Application Nasal BID    pantoprazole  40 mg Oral QAM AC             Assessment:  Pod 4 s/p AVR 23mm inspiris bioprosthesis, CABG x1 (svg-om), surgical maze and removal of AICHA due to severe, bicuspid AV stenosis.--1/6/25- ef 65  Paroxysmal atrial fibrillation,  maintaining NSR  S/p RF PVI, CTA 1/24/24  Reportedly was supposed to stop eliquis in August. To remain off for now.   Intermittent accelerated junctional.   EP evaluation 1/7-limited course of amio given hyperthyroid-->amio 400mg po x7 days, then 200mg po x7 days, then stop  Tsh/t4 1/7 and then in 7 days.   HTN- controlled   Post op vol OL   Still with volume overload. Up 4.7 L  Expected post op anemia/tcp-overall stable  Hyperthyroid on methimazole  Tsh low, t4 and free t3 normal.   Hyperlipidemia - LDL 93, on statin   Leukocytosis, likely reactive, afebrile - resolved    Plan:  [facility]. Ok to dc from our perspective but will need to continue diuretics   Amio taper as ordered  Plan to remain off ac as outlined  Follow up with dr osborne 1/21, sooner if issues arise    Kymberly Vanegas NP  1/10/2025  5:18 PM  [phone]      Note reviewed and labs reviewed. Agree with above  assessment and plan. MDM per me.  69-year-old male who is postop day 4 s/p AVR 23 mm Inspiris bioprosthesis, CABG x 1, surgical maze, removal of left atrial appendage who is progressing clinically.  At this time, clinically shows volume overload.  CT surgery eval'd today and are okay with discharge home.  Patient will need diuresis with outpatient BMP and timely follow-up.    Angel Mcbride DO  Cardiologist  [facility]  1/10/2025 5:25 PM      Note to the patient: The 21st Century Cures Act makes medical notes like these available to patients in the interest of transparency. However, be advised that this is a medical document. It is intended as peer to peer communication. It is written in medical language and may contain abbreviations or verbiage that are unfamiliar. It may appear blunt or direct. Medical documents are intended to carry relevant information, facts as evident, and clinical opinion of the practitioner.     Disclaimer: Components of this note were documented using voice recognition system and are subject to errors not corrected at proofreading. Contact the author of this note for any clarifications.

## 2025-01-11 NOTE — PLAN OF CARE
Discharged  home via wheelchair  Accompanied by Support staff   Belongings taken by patient/family  PIV removed  Tele box removed & placed in the drawer  Discharge Navigator completed  Discharge instructions reviewed with patient a bedside  All questions & concerns addressed at his time.           Problem: PAIN - ADULT  Goal: Verbalizes/displays adequate comfort level or patient's stated pain goal  Description: INTERVENTIONS:  - Encourage pt to monitor pain and request assistance  - Assess pain using appropriate pain scale  - Administer analgesics based on type and severity of pain and evaluate response  - Implement non-pharmacological measures as appropriate and evaluate response  - Consider cultural and social influences on pain and pain management  - Manage/alleviate anxiety  - Utilize distraction and/or relaxation techniques  - Monitor for opioid side effects  - Notify MD/LIP if interventions unsuccessful or patient reports new pain  - Anticipate increased pain with activity and pre-medicate as appropriate  Outcome: Progressing     Problem: RISK FOR INFECTION - ADULT  Goal: Absence of fever/infection during anticipated neutropenic period  Description: INTERVENTIONS  - Monitor WBC  - Administer growth factors as ordered  - Implement neutropenic guidelines  Outcome: Progressing     Problem: SAFETY ADULT - FALL  Goal: Free from fall injury  Description: INTERVENTIONS:  - Assess pt frequently for physical needs  - Identify cognitive and physical deficits and behaviors that affect risk of falls.  - [place] fall precautions as indicated by assessment.  - Educate pt/family on patient safety including physical limitations  - Instruct pt to call for assistance with activity based on assessment  - Modify environment to reduce risk of injury  - Provide assistive devices as appropriate  - Consider OT/PT consult to assist with strengthening/mobility  - Encourage toileting schedule  Outcome: Progressing     Problem:  DISCHARGE PLANNING  Goal: Discharge to home or other facility with appropriate resources  Description: INTERVENTIONS:  - Identify barriers to discharge w/pt and caregiver  - Include patient/family/discharge partner in discharge planning  - Arrange for needed discharge resources and transportation as appropriate  - Identify discharge learning needs (meds, wound care, etc)  - Arrange for interpreters to assist at discharge as needed  - Consider post-discharge preferences of patient/family/discharge partner  - Complete POLST form as appropriate  - Assess patient's ability to be responsible for managing their own health  - Refer to Case Management Department for coordinating discharge planning if the patient needs post-hospital services based on physician/LIP order or complex needs related to functional status, cognitive ability or social support system  Outcome: Progressing     Problem: CARDIOVASCULAR - ADULT  Goal: Maintains optimal cardiac output and hemodynamic stability  Description: INTERVENTIONS:  - Monitor vital signs, rhythm, and trends  - Monitor for bleeding, hypotension and signs of decreased cardiac output  - Evaluate effectiveness of vasoactive medications to optimize hemodynamic stability  - Monitor arterial and/or venous puncture sites for bleeding and/or hematoma  - Assess quality of pulses, skin color and temperature  - Assess for signs of decreased coronary artery perfusion - ex. Angina  - Evaluate fluid balance, assess for edema, trend weights  Outcome: Progressing  Goal: Absence of cardiac arrhythmias or at baseline  Description: INTERVENTIONS:  - Continuous cardiac monitoring, monitor vital signs, obtain 12 lead EKG if indicated  - Evaluate effectiveness of antiarrhythmic and heart rate control medications as ordered  - Initiate emergency measures for life threatening arrhythmias  - Monitor electrolytes and administer replacement therapy as ordered  Outcome: Progressing

## 2025-01-13 ENCOUNTER — TELEPHONE (OUTPATIENT)
Dept: CARDIOLOGY UNIT | Facility: HOSPITAL | Age: 70
End: 2025-01-13

## 2025-01-13 NOTE — PAYOR COMM NOTE
--------------  CONTINUED STAY REVIEW    Payor: [employer] [facility] [employer]  Subscriber #:  [id number]  Authorization Number: [id number]    Admit date: 1/6/25  Admit time:  5:33 AM          REVIEW DOCUMENTATION:      1/6 Operative report    Signed          [facility]     PATIENT'S NAME: LUIS FERNANDO MCMAHON   ATTENDING PHYSICIAN: Howie Diaz MD   OPERATING PHYSICIAN: Howie Diaz MD   PATIENT ACCOUNT#:   [id number]    LOCATION:  [address] [place] [address]  MEDICAL RECORD #:   [id number]       YOB: 1955  ADMISSION DATE:       01/06/2025      OPERATION DATE:  01/06/2025     OPERATIVE REPORT     PREOPERATIVE DIAGNOSIS:  Aortic stenosis, aortic insufficiency, coronary artery disease, history of atrial fibrillation.  POSTOPERATIVE DIAGNOSIS:  Aortic stenosis, aortic insufficiency, coronary artery disease, history of atrial fibrillation.  PROCEDURE:    1.Aortic valve replacement with 23 mm Inspiris bovine pericardial valve.  2.Single saphenous vein graft to the obtuse marginal.  3.EnCompass radiofrequency maze.  4.Amputation of left atrial appendage.     ASSISTANT:  Gagan George PA-C.     ANESTHESIA:  General endotracheal.     BLOOD LOSS:  600 mL.     COMPLICATIONS:  None.     INDICATIONS:  This patient is a 69-year-old gentleman with diagnosed bicuspid valve, moderately severe aortic stenosis, moderately severe aortic insufficiency, coronary artery disease, history of atrial fibrillation with 2 prior ablations.  Risks, benefits, and options of surgical correction versus other treatment modalities were discussed.       OPERATIVE TECHNIQUE:  Appropriate lines and catheters were placed.  General anesthesia was induced.  [place]-R[place]der was placed.  Transesophageal echo was placed.  Transcranial oximetry was placed.  Common femoral arterial line was placed.  Patient prepped and draped.  Sternotomy was performed.  Pericardium was opened.  The aorta was size-appropriate for the patient's 2.2 m2 body surface area.   Segment of saphenous graft was harvested from the left thigh.  The patient was heparinized and cannulated for bypass.  On bypass, patient was cooled to 26 degrees centigrade.  The aorta was crossclamped.  Blood cardioplegia was infused to achieve electromechanical arrest of the heart.  The EnCompass bipolar radiofrequency maze was then accomplished in the usual fashion.  Left atrial appendage was then amputated.  There was no clot present.  This was oversewn with 2 layers of 3-0 Prolene suture.  The obtuse marginal coronary was then bypassed, which was a 1.5 mm artery of good quality.  A segment of saphenous graft was placed here.  Cardioplegia was given.  Retrograde cannula was placed.  Vent was placed via the right superior pulmonary vein.   Aorta was opened through a transverse aortotomy.  The valve was inspected and found to be horrifically calcified.  Both the valve and the annulus together were extremely calcified.  Decalcification of the valve and annulus took approximately 20 minutes, which is unusually long, with care being taken to remove all calcific debris from the operative field.  The area was sized and found to accommodate a 23 mm valve, which was prepared while valve sutures were placed circumferentially with the pledgets on the ventricular side.  Sutures were then passed through the sewing ring of the 23 mm valve which was lowered in place and secured using manual knots as well as CorKnots.  After seating the valve, I could easily give cardioplegia directly via the right and left main coronary ostia.  Rewarming was begun while the aortotomy was closed.  Prior to complete closure, the heart was manually de-aired.  While continuing to rewarm, proximal anastomosis for the obtuse graft was constructed end-to-side to the aorta.  Warm cardioplegia was given.  The aorta was unclamped.  Following complete and thorough rewarming and de-airing and resumption of sinus rhythm, patient was weaned from  cardiopulmonary bypass with low-dose dobutamine without any difficulty.  Pump time 146 minutes.  Crossclamp 117 minutes.  Cardioplegia 3.3 L.  Patient was decannulated.  Protamine was administered.  Pacing leads were applied to the heart.  Chest was closed with double-stranded wire supplemented with two 8-hole titanium plates for further security.  Subcutaneous tissue and skin were closed.  As the patient's platelet count was only 70,000 at the completion of the operation, a unit of platelets was given.  Bleeding promptly slowed to an acceptable level.  Patient was transferred to the ICU in stable condition.       The patient's family was updated by me regarding the patient's condition and the conduct of the operation.                       MEDICATIONS ADMINISTERED IN LAST 1 DAY:  Administration History           amiodarone (Pacerone) tab 400 mg  Dose: 400 mg  Freq: Daily Route: OR  Start: 01/07/25 1600 End: 01/10/25 2202          1657 MC-Given      0905 RM-Given      0902 KM-Given      0943 EG-Given            aspirin chewable tab 81 mg  Dose: 81 mg  Freq: 2 times daily Route: OR  Start: 01/07/25 0900 End: 01/10/25 2202          0927 KT-Given     2053 MS-Given      0906 RM-Given     2031 RH-Given      0902 KM-Given     2012 RH-Given      0943 EG-Given                                            ceFAZolin (Ancef) 2g in 10mL IV syringe premix  Dose: 2 g  Freq: Every 8 hours Route: IV  Last Dose: 2 g (01/08/25 0341)  Start: 01/06/25 1830 End: 01/08/25 0346   Order specific questions:            1810 KT-New Bag      0336 MS-New Bag     1032 KT-New Bag     1751 MC-New Bag      0341 MS-New Bag          ceFAZolin (Ancef) 2g in 10mL IV syringe premix  Dose: 2 g  Freq: On call Route: IV  Start: 01/07/25 0000 End: 01/06/25 1047   Order specific questions:            0707 KH-Given     1047 KH-Given     1204 KH-Anesthesia Volume Adjustment      (0000 MS)-Not Given                             furosemide (Lasix) 10 mg/mL  injection 40 mg  Dose: 40 mg  Freq: Daily Route: IV  Start: 01/10/25 1145 End: 01/10/25 2202             1244 EG-Given     2202-D/C'd      furosemide (Lasix) 10 mg/mL injection 40 mg  Dose: 40 mg  Freq: 2 times daily (diuretic) Route: IV  Start: 01/08/25 0900 End: 01/09/25 1654           0909 RM-Given     1720 KM-Given      0903 KM-Given     1654 KM-Given             Or   pantoprazole (Protonix) DR tab 40 mg  Dose: 40 mg  Freq: Every morning before breakfast Route: OR  Start: 01/06/25 1145 End: 01/10/25 2202   Admin Instructions:   Do not crush                     0619 MS-Given      0510 RH-Given      0547 RH-Given     2202-D/C'd      potassium chloride (Klor-Con M20) tab 40 mEq  Dose: 40 mEq  Freq: Every 4 hours Route: OR  Start: 01/10/25 1500 End: 01/10/25 1738   Admin Instructions:   Do not crush             1513 EG-Given     1738 EG-Given        potassium chloride (Klor-Con M20) tab 40 mEq  Dose: 40 mEq  Freq: Every 4 hours Route: OR  Start: 01/10/25 0600 End: 01/10/25 0942   Admin Instructions:   Do not crush             0625 RH-Given     0942 EG-Given                                       vancomycin (Vancocin) 1.5 g in sodium chloride 0.9% 250mL IVPB premix  Dose: 15 mg/kg  Weight Dosing Info: 96 kg  Freq: Every 12 hours Route: IV  Last Dose: 1,500 mg (01/07/25 0837)  Start: 01/06/25 1900 End: 01/07/25 1007   Admin Instructions:   Administer two doses, 12 hours after pre-op dose given.   Order specific questions:            1857 KT-New Bag      0837 KT-New Bag                    Medications 01/01 01/02 01/03 01/04 01/05 01/06 01/07 01/08 01/09 01/10    amiodarone in dextrose 4.14% (Cordarone) 360 mg/200mL infusion premix  Rate: 33.3 mL/hr Dose: 1 mg/min  Freq: Continuous Route: IV  Last Dose: 1 mg/min (01/06/25 1701)  Start: 01/06/25 1030 End: 01/06/25 8181   Admin Instructions:   use 0.22 micron filter and change filter with every bag         1200 KT-New Bag [C]     1600 KT-Rate/Dose Verify     1701 KT-New  Bag     1759-D/C'd          Followed by   amiodarone in dextrose 4.14% (Cordarone) 360 mg/200mL infusion premix  Rate: 16.7 mL/hr Dose: 0.5 mg/min  Freq: Continuous Route: IV  Last Dose: Stopped (01/07/25 0851)  Start: 01/06/25 1800 End: 01/07/25 0825   Admin Instructions:   use 0.22 micron filter and change filter with every bag         1800 KT-Rate/Dose Change     2000 MS-Rate/Dose Verify      0000 MS-Rate/Dose Verify     0122 MS-New Bag     0400 MS-Rate/Dose Verify     0825-D/C'd  0851 KT-Stopped           dexmedeTOMIDine in sodium chloride 0.9% (Precedex) 400 mcg/100mL infusion premix  Rate: 4.8-36 mL/hr Dose: 0.2-1.5 mcg/kg/hr  Weight Dosing Info: 96 kg  Freq: Continuous Route: IV  Last Dose: Stopped (01/06/25 1600)  Start: 01/06/25 0845 End: 01/07/25 0826   Admin Instructions:   Low dose:  0.2 mcg/kg/hr for patients age >65 years old, hypotension, or liver dysfunction  Moderate dose:  0.4 mcg/kg/hr for patients age 45-65 years old, normotensive  High dose:  0.8 mcg/kg/hr for patients age <45 years old, hypertensive, substance withdrawal  Max dose:  1.5 mcg/kg/hr    No sedation holiday required.  Stop dexmedetomidine infusion if HR less than 50 bpm, or SBP less than  90, MAP less than 60, or clinical evidence of hypoperfusion and notify physician.  May continue Dexmedetomidine infusion through extubation and for up to 1 hour post extubation as needed for anxiety/agitation (RASS greater than 0).  Titrate dose by 0.1 mcg/kg/hr every 30 minutes with a target RASS of 0   Order specific questions:            1208 KT/CV-New Bag [C]     1221 KT-Rate/Dose Change     1250 KT-Stopped     1321 KT-Restarted     1451 CE/MC-New Bag     1500 KT-Rate/Dose Change     1509 KT-Rate/Dose Change     1600 KT-Stopped      0826-D/C'd                           dextrose 5%-sodium chloride 0.45% infusion  Rate:  mL/hr Dose:  mL/hr  Freq: Continuous Route: IV  Last Dose: 30 mL/hr (01/07/25 0400)  Start: 01/06/25 1145 End:  01/07/25 0826   Admin Instructions:   Rate for all IV fluids = 100ml/hr for first 8 hours  After 8 hours, total rate for all IV fluids = 60 ml/hr         1210 KT-New Bag     2000 MS-Rate/Dose Verify      0400 MS-Rate/Dose Change     0826-D/C'd         DOBUTamine in dextrose 5% (Dobutrex) 500 mg/250mL infusion premix  Rate: 7.2-57.6 mL/hr Dose: 2.5-20 mcg/kg/min  Weight Dosing Info: 96 kg (Dosing Weight)  Freq: Continuous PRN Route: IV  PRN Comment: Cardiac Index (CI) < 2 and Systolic BP < 90  Last Dose: Stopped (01/07/25 0600)  Start: 01/06/25 1140 End: 01/08/25 0846   Admin Instructions:   Continue if started in the OR; contact provider if goals are not met.   Order specific questions:            1208 KT-New Bag [C]     1530 KT-Rate/Dose Change     1600 KT-Rate/Dose Change     2000 MS-Rate/Dose Verify     2032 MS-Rate/Dose Change      0000 MS-Stopped     0122 MS-Restarted     0200 MS-Rate/Dose Change     0400 MS-Rate/Dose Verify     0600 MS-Stopped      0846-D/C'd        HYDROmorphone in sodium chloride 0.9% (Dilaudid) 20 mg/100mL PCA premix  Freq: Continuous Route: IV  Start: 01/06/25 1145 End: 01/08/25 0846   Admin Instructions:   (Conc = 0.2 mg/mL) Administer via CADD Pump.   Order specific questions:                 1238 KT/AC-New Bag     1916 KT/MS-Handoff      0731 MS/KT-Handoff     1953 KT/MS-Handoff      0742 MS/RM-Handoff     0846-D/C'd        insulin regular human (Novolin R, Humulin R) 100 Units in sodium chloride 0.9% 100 mL standard infusion (100 mL)  Rate: 1-57 mL/hr Dose: 1-57 Units/hr  Freq: Continuous Route: IV  Last Dose: Stopped (01/07/25 0800)  Start: 01/06/25 1345 End: 01/08/25 0846   Admin Instructions:   Use hyperlink for Post-open heart IV insulin for titration  Start on Base Algorithm; Column 4         1300 KT-Rate/Dose Verify     1350 KT/MC-New Bag     1400 KT-Rate/Dose Change     1500 KT-Rate/Dose Change     1600 KT-Rate/Dose Change     1700 KT-Rate/Dose Verify     1800 KT-Rate/Dose  Change     1900 KT-Rate/Dose Verify     1916 KT/MS-Handoff     2000 MS-Rate/Dose Verify     2100 MS-Rate/Dose Verify     2200 MS-Rate/Dose Verify     2300 MS-Rate/Dose Verify      0000 MS-Rate/Dose Verify     0100 MS-Rate/Dose Change          0200 MS-Rate/Dose Change     0300 MS-Rate/Dose Change     0400 MS-Rate/Dose Change     0500 MS-Rate/Dose Change     0600 MS-Rate/Dose Verify     0700 MS-Rate/Dose Change     0731 MS/KT-Handoff     0800 KT-Stopped      0846-D/C'd        insulin regular human (Novolin R, Humulin R) 100 Units in sodium chloride 0.9% 100 mL standard infusion (100 mL)  Rate: 1-57 mL/hr Dose: 1-57 Units/hr  Freq: Continuous Route: IV  Last Dose: 2 Units/hr (01/06/25 1222)  Start: 01/06/25 1145 End: 01/06/25 1345   Admin Instructions:   Use hyperlink for Post-open heart IV insulin for titration  Start on Base Algorithm; Column 4         1209 KT/CV-New Bag [C]     1222 KT-Rate/Dose Change     1345-D/C'd          nitroGLYCERIN in dextrose 5% 50 mg/250mL infusion premix  Rate: 1.5-90 mL/hr Dose: 5-300 mcg/min  Freq: Continuous PRN Route: IV  PRN Comment: bp management  Last Dose: Stopped (01/07/25 0800)  Start: 01/06/25 1140 End: 01/08/25 0846   Admin Instructions:   Titrate to keep SBP greater than 90, less than 140  Continue if started in the OR; contact provider if goals are not met.   Order specific questions:            1209 KT-New Bag     1223 KT-Rate/Dose Change     1230 KT-Rate/Dose Change     1300 KT-Rate/Dose Change     1330 KT-Rate/Dose Change     1500 KT-Rate/Dose Change     1515 KT-Rate/Dose Change     1530 KT-Rate/Dose Change     1545 KT-Rate/Dose Change     1550 KT-Rate/Dose Change     1600 KT-Rate/Dose Change     1610 KT-Stopped     2345 MS-Restarted      0200 MS-Rate/Dose Change     0400 MS-Rate/Dose Change     0800 KT-Stopped      0846-D/C'd                          propofol (Diprivan) 10 mg/mL infusion premix  Rate: 2.9-28.8 mL/hr Dose: 5-50 mcg/kg/min  Weight Dosing Info: 96 kg  (Dosing Weight)  Freq: Continuous Route: IV  Last Dose: Stopped (01/06/25 1500)  Start: 01/06/25 1230 End: 01/07/25 0826   Admin Instructions:   DO NOT bolus patient with propofol due to hypotensive effects.  If doses greater than 50mcg/kg/min are needed, provider must be notified.  Change tubing every 12 hours   Order specific questions:            1221 KT/CV-New Bag     1250 KT-Stopped     1321 KT-Restarted     1500 KT-Stopped      0826-D/C'd         sodium chloride 0.9% infusion  Rate: 10 mL/hr  Freq: Continuous Route: IV  Start: 01/06/25 1145 End: 01/08/25 0846         1210 KT-New Bag       0846-D/C'd        sodium chloride 0.9% infusion  Rate: 10 mL/hr  Freq: Continuous Route: IV  Start: 01/06/25 1145 End: 01/08/25 0846   Admin Instructions:   To all other central line ports         1210 KT-New Bag     2000 MS-Rate/Dose Change      0400 MS-Rate/Dose Verify [C]      0846-D/C'd                               Vitals (last day) before discharge       Date/Time Temp Pulse Resp BP SpO2 Weight O2 Device O2 Flow Rate (L/min) [facility]    01/10/25 1649 97.9 °F (36.6 °C) 85 19 102/73 98 % -- None (Room air) --     01/10/25 1229 97.6 °F (36.4 °C) 82 21 133/79 98 % -- None (Room air) --     01/10/25 0832 98 °F (36.7 °C) 82 21 117/73 95 % -- None (Room air) --     01/10/25 0401 -- -- -- -- -- 215 lb 9.8 oz (97.8 kg) -- --     01/10/25 0401 98.4 °F (36.9 °C) 81 24 127/77 90 % -- None (Room air) --     01/09/25 2354 97.8 °F (36.6 °C) 76 32 109/76 93 % -- None (Room air) 0 L/min     01/09/25 2015 -- 82 33 101/74 -- -- None (Room air) -- RH    01/09/25 1954 98.4 °F (36.9 °C) 84 28 96/69 93 % -- None (Room air) 0 L/min ZH    01/09/25 1705 -- -- -- -- -- -- Nasal cannula 3 L/min     01/09/25 1600 97.9 °F (36.6 °C) 86 33 125/73 93 % -- Nasal cannula 5 L/min AL    01/09/25 1501 -- 86 20 -- 83 % -- -- --     01/09/25 1455 -- 86 26 -- 92 % -- Nasal cannula 5 L/min     01/09/25 1452 -- 87 14 -- 87 % -- Nasal cannula 5 L/min      01/09/25 1450 -- 87 19 -- 67 % -- Nasal cannula 4 L/min     01/09/25 1447 -- 87 25 -- 77 % -- None (Room air) --     01/09/25 1214 98.3 °F (36.8 °C) 82 22 108/66 90 % -- None (Room air) -- AL    01/09/25 0900 -- 89 22 112/71 -- -- None (Room air) --     01/09/25 0845 -- 89 32 -- -- -- -- -- AL    01/09/25 0637 -- -- -- -- -- -- Nasal cannula 2 L/min     01/09/25 0505 98.6 °F (37 °C) 84 22 126/75 92 % 219 lb 2.2 oz (99.4 kg) Nasal cannula 3.5 L/min MR          CIWA Scores (last 5 days) before discharge       None          Blood Transfusion Record       Product Unit Status Volume Start End            Transfuse fresh frozen plasma       24  [id number]  [id number] Completed 01/06/25 1231 350 mL 01/06/25 1154 01/06/25 1204       24  [id number]  [id number] Completed 01/06/25 1231 350 mL 01/06/25 1154 01/06/25 1204                Transfuse platelets       24  [id number]  [id number] Completed 01/06/25 1231 350 mL 01/06/25 1118 01/06/25 1128

## 2025-01-13 NOTE — PROGRESS NOTES
Received call from home PT Em who reported pts BP went up after exercises, call patient in follow up, he reports he feels great, no symptoms, he does not have a home BP cuff but did order one, advised he check BP in am and call if over 150/80, he understands and agrees.  Tiffanie Morrison RN  Clinical Coordinator  [facility]

## 2025-01-14 ENCOUNTER — TELEPHONE (OUTPATIENT)
Dept: CARDIOLOGY UNIT | Facility: HOSPITAL | Age: 70
End: 2025-01-14

## 2025-01-14 NOTE — PROGRESS NOTES
Follow Up Phone Call    1. How are you doing now that you are home? No Complaints    2. Have there been any changes in your wound/incision since going home? No    3. Is your pain manageable at home? Yes,     4. Are you following the walking routine given to you in the hospital? Yes, I am walking more than they told me to do.  5. Are you continuing to use your incentive spirometer? Yes, now reaching over 2500 on the spirometry    6. Do you have your appointments for Chest Xray?  Yes, 1/21/2025 at 9:40                                                              Primary MD?  No, but will try to make an appointment                                                              Cardiologist?  Yes,  1/21/2025 at 10:40                                                              Dr. Diaz? Yes, 2/4/2025 at 10:30                       Cardiac Rehab?  No, but says that he will be going to [facility] for rehab once he gets the OK from cardiology to set this up    7. Do you have any other questions or concerns today? No, he says that he is doing really well        Omayra LONG RN  1/14/2025  12:14 PM

## 2025-01-21 ENCOUNTER — HOSPITAL ENCOUNTER (OUTPATIENT)
Dept: GENERAL RADIOLOGY | Facility: HOSPITAL | Age: 70
Discharge: HOME | End: 2025-01-21
Attending: THORACIC SURGERY (CARDIOTHORACIC VASCULAR SURGERY)
Payer: COMMERCIAL

## 2025-01-21 DIAGNOSIS — J90 PLEURAL EFFUSION: ICD-10-CM

## 2025-01-21 PROCEDURE — 71048 X-RAY EXAM CHEST 4+ VIEWS: CPT | Performed by: THORACIC SURGERY (CARDIOTHORACIC VASCULAR SURGERY)

## 2025-02-25 DIAGNOSIS — T46.2X5A HYPERTHYROIDISM DUE TO AMIODARONE: ICD-10-CM

## 2025-02-25 DIAGNOSIS — E05.80 HYPERTHYROIDISM DUE TO AMIODARONE: ICD-10-CM

## 2025-02-25 RX ORDER — METHIMAZOLE 10 MG/1
TABLET ORAL
Qty: 75 TABLET | Refills: 0 | Status: SHIPPED | OUTPATIENT
Start: 2025-02-25 | End: 2025-03-03

## 2025-02-25 NOTE — TELEPHONE ENCOUNTER
Received fax from [facility] stating \"insurance requires 90 day supply\" for requested methimazole 10 mg tablet.

## 2025-02-25 NOTE — TELEPHONE ENCOUNTER
Endocrine refill protocol for medications for hypothyroidism and hyperthyroidism    Protocol Criteria:  FAILED Reason: Abnormal labs    If all below requirements are met, send a 90-day supply with 1 refill per provider protocol.    Verify appointment with Endocrinology completed in the last 12 months or scheduled in the next 6 months.    Normal TSH result in the past 12 months   Review recent telephone encounters and mychart communications with patient to ensure a dose change has not occurred since last office visit that was not updated in the medication history list     Last completed office visit:9/12/2024 Gloria Cueva APN   Next scheduled Follow up: No future appointments.   Last TSH result:   TSH   Date Value Ref Range Status   01/07/2025 0.076 (L) 0.550 - 4.780 uIU/mL Final   11/21/2020 <0.005 uIU/mL Final

## 2025-02-25 NOTE — TELEPHONE ENCOUNTER
1mo supply sent  Patient overdue for labs and office visit (labs already ordered)  Please inform to complete labs I ordered and schedule next available follow up - thanks!

## 2025-03-02 DIAGNOSIS — E05.80 HYPERTHYROIDISM DUE TO AMIODARONE: ICD-10-CM

## 2025-03-02 DIAGNOSIS — T46.2X5A HYPERTHYROIDISM DUE TO AMIODARONE: ICD-10-CM

## 2025-03-02 RX ORDER — METHIMAZOLE 10 MG/1
TABLET ORAL
Qty: 75 TABLET | Refills: 0 | Status: CANCELLED | OUTPATIENT
Start: 2025-03-02

## 2025-03-03 RX ORDER — METHIMAZOLE 10 MG/1
TABLET ORAL
Qty: 225 TABLET | Refills: 0 | Status: SHIPPED | OUTPATIENT
Start: 2025-03-03 | End: 2025-03-18

## 2025-03-03 NOTE — TELEPHONE ENCOUNTER
Sent 90 day supply. Staff-- please notify patient (call him) he NEEDS repeat labs asap and to schedule next available appointment w/ me. Thanks

## 2025-03-07 ENCOUNTER — LAB ENCOUNTER (OUTPATIENT)
Dept: LAB | Age: 70
End: 2025-03-07
Attending: INTERNAL MEDICINE
Payer: COMMERCIAL

## 2025-03-07 DIAGNOSIS — E78.00 PURE HYPERCHOLESTEROLEMIA: Primary | ICD-10-CM

## 2025-03-07 LAB
ALBUMIN SERPL-MCNC: 4.9 G/DL (ref 3.2–4.8)
ALBUMIN/GLOB SERPL: 2 {RATIO} (ref 1–2)
ALP LIVER SERPL-CCNC: 85 U/L
ALT SERPL-CCNC: 14 U/L
ANION GAP SERPL CALC-SCNC: 7 MMOL/L (ref 0–18)
AST SERPL-CCNC: 24 U/L (ref ?–34)
BILIRUB SERPL-MCNC: 0.6 MG/DL (ref 0.2–1.1)
BUN BLD-MCNC: 14 MG/DL (ref 9–23)
CALCIUM BLD-MCNC: 9.8 MG/DL (ref 8.7–10.6)
CHLORIDE SERPL-SCNC: 105 MMOL/L (ref 98–112)
CHOLEST SERPL-MCNC: 138 MG/DL (ref ?–200)
CO2 SERPL-SCNC: 29 MMOL/L (ref 21–32)
CREAT BLD-MCNC: 1.08 MG/DL
EGFRCR SERPLBLD CKD-EPI 2021: 75 ML/MIN/1.73M2 (ref 60–?)
FASTING PATIENT LIPID ANSWER: YES
FASTING STATUS PATIENT QL REPORTED: YES
GLOBULIN PLAS-MCNC: 2.5 G/DL (ref 2–3.5)
GLUCOSE BLD-MCNC: 93 MG/DL (ref 70–99)
HDLC SERPL-MCNC: 43 MG/DL (ref 40–59)
LDLC SERPL CALC-MCNC: 81 MG/DL (ref ?–100)
NONHDLC SERPL-MCNC: 95 MG/DL (ref ?–130)
OSMOLALITY SERPL CALC.SUM OF ELEC: 292 MOSM/KG (ref 275–295)
POTASSIUM SERPL-SCNC: 4.2 MMOL/L (ref 3.5–5.1)
PROT SERPL-MCNC: 7.4 G/DL (ref 5.7–8.2)
SODIUM SERPL-SCNC: 141 MMOL/L (ref 136–145)
TRIGL SERPL-MCNC: 67 MG/DL (ref 30–149)
VLDLC SERPL CALC-MCNC: 11 MG/DL (ref 0–30)

## 2025-03-07 PROCEDURE — 36415 COLL VENOUS BLD VENIPUNCTURE: CPT

## 2025-03-07 PROCEDURE — 80053 COMPREHEN METABOLIC PANEL: CPT

## 2025-03-07 PROCEDURE — 80061 LIPID PANEL: CPT

## 2025-03-15 ENCOUNTER — LAB ENCOUNTER (OUTPATIENT)
Dept: LAB | Age: 70
End: 2025-03-15
Payer: COMMERCIAL

## 2025-03-15 DIAGNOSIS — E05.80 HYPERTHYROIDISM DUE TO AMIODARONE: ICD-10-CM

## 2025-03-15 DIAGNOSIS — T46.2X5A HYPERTHYROIDISM DUE TO AMIODARONE: ICD-10-CM

## 2025-03-15 DIAGNOSIS — E05.00 GRAVES DISEASE: ICD-10-CM

## 2025-03-15 LAB
T3 SERPL-MCNC: 0.78 NG/ML (ref 0.6–1.81)
T4 FREE SERPL-MCNC: 0.7 NG/DL (ref 0.8–1.7)
TSI SER-ACNC: 8.13 UIU/ML (ref 0.55–4.78)

## 2025-03-15 PROCEDURE — 84443 ASSAY THYROID STIM HORMONE: CPT

## 2025-03-15 PROCEDURE — 84480 ASSAY TRIIODOTHYRONINE (T3): CPT

## 2025-03-15 PROCEDURE — 84439 ASSAY OF FREE THYROXINE: CPT

## 2025-03-17 ENCOUNTER — PATIENT MESSAGE (OUTPATIENT)
Facility: CLINIC | Age: 70
End: 2025-03-17

## 2025-03-17 DIAGNOSIS — E05.80 HYPERTHYROIDISM DUE TO AMIODARONE: Primary | ICD-10-CM

## 2025-03-17 DIAGNOSIS — T46.2X5A HYPERTHYROIDISM DUE TO AMIODARONE: ICD-10-CM

## 2025-03-17 DIAGNOSIS — T46.2X5A HYPERTHYROIDISM DUE TO AMIODARONE: Primary | ICD-10-CM

## 2025-03-17 DIAGNOSIS — E05.80 HYPERTHYROIDISM DUE TO AMIODARONE: ICD-10-CM

## 2025-03-18 RX ORDER — METHIMAZOLE 10 MG/1
TABLET ORAL
COMMUNITY
Start: 2025-03-18

## 2025-03-29 ENCOUNTER — LAB ENCOUNTER (OUTPATIENT)
Dept: LAB | Age: 70
End: 2025-03-29
Payer: COMMERCIAL

## 2025-03-29 DIAGNOSIS — T46.2X5A HYPERTHYROIDISM DUE TO AMIODARONE: ICD-10-CM

## 2025-03-29 DIAGNOSIS — E05.80 HYPERTHYROIDISM DUE TO AMIODARONE: ICD-10-CM

## 2025-03-29 DIAGNOSIS — E05.00 GRAVES DISEASE: ICD-10-CM

## 2025-03-29 LAB
T3 SERPL-MCNC: 0.86 NG/ML (ref 0.6–1.81)
T4 FREE SERPL-MCNC: 0.7 NG/DL (ref 0.8–1.7)
TSI SER-ACNC: 7.5 UIU/ML (ref 0.55–4.78)

## 2025-03-29 PROCEDURE — 84480 ASSAY TRIIODOTHYRONINE (T3): CPT

## 2025-03-29 PROCEDURE — 84443 ASSAY THYROID STIM HORMONE: CPT

## 2025-03-29 PROCEDURE — 83520 IMMUNOASSAY QUANT NOS NONAB: CPT

## 2025-03-29 PROCEDURE — 84445 ASSAY OF TSI GLOBULIN: CPT

## 2025-03-29 PROCEDURE — 84439 ASSAY OF FREE THYROXINE: CPT

## 2025-03-31 DIAGNOSIS — E05.00 GRAVES DISEASE: Primary | ICD-10-CM

## 2025-04-01 DIAGNOSIS — T46.2X5A HYPERTHYROIDISM DUE TO AMIODARONE: Primary | ICD-10-CM

## 2025-04-01 DIAGNOSIS — E05.80 HYPERTHYROIDISM DUE TO AMIODARONE: Primary | ICD-10-CM

## 2025-04-01 LAB — THYROTROPIN REC AB: 11.3 IU/L

## 2025-04-01 RX ORDER — METHIMAZOLE 10 MG/1
TABLET ORAL
COMMUNITY
Start: 2025-04-01

## 2025-04-02 LAB — THY STIM IMMUNO: 2.96 IU/L

## 2025-05-10 ENCOUNTER — LAB ENCOUNTER (OUTPATIENT)
Dept: LAB | Age: 70
End: 2025-05-10
Payer: COMMERCIAL

## 2025-05-10 DIAGNOSIS — E05.80 HYPERTHYROIDISM DUE TO AMIODARONE: ICD-10-CM

## 2025-05-10 DIAGNOSIS — T46.2X5A HYPERTHYROIDISM DUE TO AMIODARONE: ICD-10-CM

## 2025-05-10 LAB
T3 SERPL-MCNC: 0.92 NG/ML (ref 0.6–1.81)
T4 FREE SERPL-MCNC: 0.9 NG/DL (ref 0.8–1.7)
TSI SER-ACNC: 4.7 UIU/ML (ref 0.55–4.78)

## 2025-05-10 PROCEDURE — 84480 ASSAY TRIIODOTHYRONINE (T3): CPT

## 2025-05-10 PROCEDURE — 84439 ASSAY OF FREE THYROXINE: CPT

## 2025-05-10 PROCEDURE — 84443 ASSAY THYROID STIM HORMONE: CPT

## 2025-05-13 ENCOUNTER — VIRTUAL PHONE E/M (OUTPATIENT)
Facility: CLINIC | Age: 70
End: 2025-05-13
Payer: COMMERCIAL

## 2025-05-13 VITALS — WEIGHT: 212 LBS | BODY MASS INDEX: 30 KG/M2

## 2025-05-13 DIAGNOSIS — E05.00 GRAVES DISEASE: Primary | ICD-10-CM

## 2025-05-13 DIAGNOSIS — E05.80 HYPERTHYROIDISM DUE TO AMIODARONE: ICD-10-CM

## 2025-05-13 DIAGNOSIS — T46.2X5A HYPERTHYROIDISM DUE TO AMIODARONE: ICD-10-CM

## 2025-05-13 PROCEDURE — 98012 SYNCH AUDIO-ONLY EST SF 10: CPT

## 2025-05-13 RX ORDER — METHIMAZOLE 10 MG/1
10 TABLET ORAL EVERY MORNING
COMMUNITY
Start: 2025-05-13

## 2025-05-13 NOTE — PROGRESS NOTES
[facility] Note - Telemedicine    Lopez Miranda verbally consents to a Telephone (Audio only) visit on 5/13/2025. The visit was conducted in a private patient room.       HISTORY OF PRESENT ILLNESS   Lopez Miranda is a 70 year old male with PMHx significant for atrial fibrillation dx'd in July 2022, HTN, CAD, Graves disease who presents for consultation for thyrotoxicosis    Initial HPI consult in July 2023  Thyroid hx:  (-) Fhx of thyroid disease   Thyroid dysfunction dates back to Nov 2020, TSH: <0.005, fT4: 3.07  Works as a  and farmer, has felt \"absolutely great\" over the last few years and self-describes as generally healthy which is why he delayed seeking care for abnormal thyroid tests  Dx'd with afib 2 years ago on a work physical, s/p 2 attempts cardioversion, on amiodarone. Follows with cardiology  No known fam hx of autoimmunity    11/2020, TSH: <0.005, fT4: 3.07  7/2023: TSH - <0.005, fT4 - 2.40    Pt endorses: amiodarone use and biotin use (taking Centrum MVI w/ 100% biotin daily)    Pt denies: fatigue/weakness, temperature intolerance, tremor, palpitations, vision changes, hair loss, hyperdefectation, dysphagia, and unexplained weight loss recent illness, weight loss medication use, and recent IV contrast for imaging      Interim hx:  Oct 2023- w/ endo APN  10/2023- TSH <0.005, fT4  2.90, total T3 189  Feeling generally well, noting some joint pain but has improved, some fatigue  On MMI 30mg daily, taking 10mg TID and hoping to change to BID dosing  Follows with Dr. Lara with [facility]- he has not discussed his hyperTH diagnosis with cards yet    Dec 2023- w/ endo APN  12/2023 labs: TSH <0.005, fT4 1.8, total T3 144, taking MMI 30mg daily (splits dose into two, 15mg doses)  Feeling well, he has never felt symptomatic of his hyperTH  Has upcoming ablation scheduled w/ cardiology, he is not sure about proceeding/asking about thyroid involvement with the  afib    February 2024- w/ randell BARRERA, re: thyroid:  Had ablation with cardiology- 1/12/24- decr'd amiodarone from 200mg --> 100mg daily, plan is to meet again in April 2024 to possibly discontinue dose altogether  Feeling well, sleeping well. Occasionally more tired  Currently taking methimazole 15mg BID  2/2024 labs: TSH <0.005, fT4 1.7, total T3 129     April 2024- w/ endo BRUCE, re: thyroid: Feeling well on MMI 30mg daily. Still taking amiodarone 100mg daily, seeing cardiology Dr. Lara this afternoon and is anticipating another dose wean. Still notes ongoing fatigue (falling asleep early) especially on days he works. Denies other sx's of hypothyroidism, including constipation, hair loss.   3/2/2024- TSH <0.005, fT4 1.6, TT3 117,   3/30/24- TSH <0.005, fT4 1.30, TT3 1.41 - taking MMI 30mg daily    9/12/2024- w/ randell Duvall, re: thyroid. Phone call, video visit wouldn't work. Amiodarone was weaned as of late April 2024- he has tolerated it well.  He was hospitalized for CAP in Aug 2024. Feeling much better now, back to working on the farm. Denies palpitations, diarrhea. Has good energy-- working all day 5a-6p as it's sweet corn season.   Thryoid Meds: methimazole 30mg daily (takes 1.5 tabs of 10mg in AM and 1.5 tabs of 10mg in PM)    5/13/2025- w/ randell Duvall, re: thyroid.  Audio only- not able to get his microphone to work.   Feeling great, denies anxiety, insomnia. Weight is stable  Had a valve replacement and bypass with Dr. Diaz, following with Dr. 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    Medications:     Current Outpatient Medications:     methIMAzole 10 MG Oral Tab, Take 1 tablet (10 mg total) by mouth every morning. Dose updated 5/13/2025, Disp: , Rfl:     Multiple Vitamin (MULTI-VITAMIN) Oral Tab, 1 tablet daily., Disp: , Rfl:     enalapril 5 MG Oral Tab, Take 1 tablet (5 mg total) by mouth daily., Disp: 30 tablet, Rfl: 0    OMEPRAZOLE 20 MG Oral Capsule Delayed Release, TAKE 1 CAPSULE BY MOUTH EVERY DAY (Patient taking differently: Take 1 capsule (20 mg total) by mouth every morning.), Disp: 90 capsule, Rfl: 0    Rosuvastatin Calcium 10 MG Oral Tab, Take 1 tablet (10 mg total) by mouth nightly., Disp: , Rfl:     aspirin 81 MG Oral Chew Tab, Chew 1 tablet (81 mg total) by mouth in the morning and 1 tablet (81 mg total) before bedtime., Disp: , Rfl:      Allergies:   No Known Allergies    Social History:   Social History     Socioeconomic History    Marital status: Single   Tobacco Use    Smoking status: Never    Smokeless tobacco: Never   Vaping Use    Vaping status: Never Used   Substance and Sexual Activity    Alcohol use: Yes     Comment: Uses non-alcoholic socially    Drug use: Never   Other Topics Concern    Caffeine Concern Yes     Comment: 2 cups of coffee every morning and sometimes pop during the day.     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       Medical History:   Past Medical History:    A-fib (HCC)    Aortic stenosis    Arrhythmia    Coronary artery disease involving native coronary artery of native heart without angina pectoris    Coronary atherosclerosis    Disorder of thyroid    Essential hypertension    High blood pressure    High cholesterol    Hyperlipidemia         Surgical history:   Past Surgical History:   Procedure Laterality Date    Colonoscopy      Other      right shoulder        PHYSICAL EXAM  Limited due to telemedicine encounter    Constitutional Not in acute distress  Pulmonary: no wheezing heard  No coughing  on the phone. Speaking in full sentences   Neurological:  Alert and oriented to person, place and time.   Psychiatric: Normal affect, mood and behavior appropriate      Labs:  11/2020: TSH: <0.005, fT4: 3.07  7/2023: TSH - <0.005, fT4 - 2.40  8/2023: TSH <0.005, fT4: 3.60, total T3 187, TSI 3.96  9/2023: TSH <0.005, fT4: 2.90, total T3 187  12/2023: TSH <0.005, fT4 1.8, total T3 144  2/2024: TSH <0.005, fT4 1.7, total T3 129   3/2/2024- TSH <0.005, fT4 1.6, TT3 117,   3/30/24- TSH <0.005, fT4 1.30, TT3 1.41    Imaging:  Sept 2023 ultrasound:   Narrative   PROCEDURE:  US THYROID (CPT=76536)     COMPARISON:  None.     INDICATIONS:  E05.90 Thyrotoxicosis without thyroid storm, unspecified thyrotoxicosis type     TECHNIQUE:  High-resolution ultrasound of the thyroid gland was performed.     PATIENT STATED HISTORY: (As transcribed by Technologist)  Thyrotoxicosis     FINDINGS:       MEASUREMENTS:  RIGHT LOBE:  6.5 x 3.9 x 3.8 cm  LEFT LOBE:  6.4 x 3.5 x 2.6 cm  ISTHMUS:  1.1 cm     NODULES:  None.     OTHER:  None.      Impression   CONCLUSION:  TR1 - Benign (No FNA).  Overall enlarged thyroid gland.         Latest Reference Range & Units 09/30/23 07:43 12/16/23 07:11 03/01/24 15:08 03/30/24 07:21 07/27/24 07:14   Thy Stim Immuno 0.00 - 0.55 IU/L 4.75 (H) 2.70 (H) 1.92 (H) 1.86 (H) 1.66 (H)   (H): Data is abnormally high      Assessment & Plan:      ICD-10-CM    1. 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 Patient presented to endo clinic in July 2023, and initiated MMI treatment. (+)TSI antibodies c/w Graves.   Was taking amiodarone 200mg  daily, which was contributing to need for high-dose MMI to achieve euthyroidism. He completed an ablation to treat his afib in Jan 2024 this year with cardiologist, Dr. Lara. Stopped amiodarone in April 2024.     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 Previously attempted prednisone burst/taper to eval if more rapid TFT improvement, which would be more consistent with type 2 AIT,  however pt did not complete labs within time frame requested.  Thus, difficult to ascertain if improvement was attributable to steroids vs. MMI. TFTs have trended nicely downward on MMI; TSI trended from Dec 2023 2.7 --> March 2024 1.92 --> July 2024 1.61.      Reducing methimazole further today.  If he were to develop hypothyroidism symptoms sooner, to contact clinic so we can repeat labs promptly, he states understanding.    - decr methimazole 10mg in AM / 5mg in evening --> 10mg once daily  - TFTs after 7/1  - Long term plan with Graves is to wean MMI as tolerated over the course of 12-18 months while maintaining euthyroid state  - counseled on potential SE of MMI  - no opthalmopathy on exam, ref'd to optho for baseline testing given longstanding untreated Graves    Return in about 4 months (around 9/13/2025) for thyroid follow up.    A total of 9 minutes was spent today on obtaining history, reviewing pertinent labs, evaluating patient, providing multiple treatment options, reinforcing diet/exercise and compliance, and completing documentation.     5/13/2025  BRUCE Mcintosh

## 2025-05-30 DIAGNOSIS — E05.00 GRAVES DISEASE: ICD-10-CM

## 2025-05-30 RX ORDER — METHIMAZOLE 10 MG/1
TABLET ORAL
Qty: 90 TABLET | Refills: 1 | Status: SHIPPED | OUTPATIENT
Start: 2025-05-30

## 2025-05-30 NOTE — TELEPHONE ENCOUNTER
Endocrine refill protocol for medications for hypothyroidism and hyperthyroidism    Protocol Criteria:  PASSED Reason: N/A    If all below requirements are met, send a 90-day supply with 1 refill per provider protocol.    Verify appointment with Endocrinology completed in the last 12 months or scheduled in the next 6 months.    Normal TSH result in the past 12 months   Review recent telephone encounters and mychart communications with patient to ensure a dose change has not occurred since last office visit that was not updated in the medication history list     Last completed office visit:Visit date not found   Last completed telemed visit: 9/12/2024 Gloria Cueva APN  Next scheduled Follow up:   Future Appointments   Date Time Provider Department Center   9/10/2025  4:15 PM Gloria Cueva APN [facility]      Last TSH result:   TSH   Date Value Ref Range Status   05/10/2025 4.702 0.550 - 4.780 uIU/mL Final   11/21/2020 <0.005 uIU/mL Final     Last office note:   - labs after 7/1  - methimazole cut down to 10mg (1 tablet) daily

## 2025-08-30 ENCOUNTER — LAB ENCOUNTER (OUTPATIENT)
Dept: LAB | Age: 70
End: 2025-08-30

## 2025-08-30 DIAGNOSIS — E05.00 GRAVES DISEASE: ICD-10-CM

## 2025-08-30 LAB
T4 FREE SERPL-MCNC: 2 NG/DL (ref 0.8–1.7)
TSI SER-ACNC: <0.01 UIU/ML (ref 0.55–4.78)

## 2025-08-30 PROCEDURE — 84439 ASSAY OF FREE THYROXINE: CPT

## 2025-08-30 PROCEDURE — 84443 ASSAY THYROID STIM HORMONE: CPT

## 2025-08-30 PROCEDURE — 84445 ASSAY OF TSI GLOBULIN: CPT

## 2025-08-30 PROCEDURE — 36415 COLL VENOUS BLD VENIPUNCTURE: CPT

## (undated) DIAGNOSIS — Z11.59 ENCOUNTER FOR SCREENING FOR OTHER VIRAL DISEASES: ICD-10-CM

## (undated) DIAGNOSIS — Z01.818 PREOP EXAMINATION: Primary | ICD-10-CM

## (undated) DEVICE — BATTERY PACK FOR VARISPEED: Brand: STRYKER VARISPEED

## (undated) DEVICE — INTENT TO BE USED WITH SUTURE MATERIAL FOR TISSUE CLOSURE: Brand: RICHARD-ALLAN® NEEDLE 1/2 CIRCLE TAPER

## (undated) DEVICE — CLAMP BPLR RF ABLAT OPN LNG JAW W/ GLIDEPATH

## (undated) DEVICE — SUT 6 DBL WIRE 14IN CCS-1 NABSRB L49MM 1/2 CI

## (undated) DEVICE — SUT PROL 4-0 36IN V-5 NABSRB BLU L17MM 1/2 CI

## (undated) DEVICE — Device: Brand: INTELLICART™

## (undated) DEVICE — LACTATED R 1000ML INJ

## (undated) DEVICE — NDL CNTR 40CT FM MAG: Brand: MEDLINE INDUSTRIES, INC.

## (undated) DEVICE — [HIGH FLOW INSUFFLATOR,  DO NOT USE IF PACKAGE IS DAMAGED,  KEEP DRY,  KEEP AWAY FROM SUNLIGHT,  PROTECT FROM HEAT AND RADIOACTIVE SOURCES.]: Brand: PNEUMOSURE

## (undated) DEVICE — SUT PROL 3-0 36IN SH NABSRB BLU 26MM 1/2 CIR

## (undated) DEVICE — SPONGE 4X4 10PK

## (undated) DEVICE — TRAY ENDOVEIN KTV16 HARVESTING

## (undated) DEVICE — SUT PROL 3-0 36IN V-7 NABSRB BLU L26MM 1/2 CI

## (undated) DEVICE — OPEN HEART: Brand: MEDLINE INDUSTRIES, INC.

## (undated) DEVICE — SUT PROL 4-0 1XL36IN V-5 NABSRB BLU L17MM TAP

## (undated) DEVICE — CELL SAVER RESERVOIR

## (undated) DEVICE — CATHETER URETH AD 20FR L16IN YEL RUB RND TIP

## (undated) DEVICE — TRANSFER PACK CONTAINER 600 ML WITH COUPLER. STERILE FLUID PATH: Brand: FENWAL TRANSFER PACK CONTAINER 600 ML WITH COUPLER

## (undated) DEVICE — SUT PROL 4-0 36IN SH NABSRB BLU 26MM 1/2 CIR

## (undated) DEVICE — FIXED CORE WIRE GUIDE SAFE-T-J, CURVED: Brand: COOK

## (undated) DEVICE — SOLUTION IRRIG 1000ML 0.9% NACL USP BTL

## (undated) DEVICE — SUT PROL 3-0 36IN SH NABSRB BLU L26MM 1/2 CIR

## (undated) DEVICE — STERILE POLYISOPRENE POWDER-FREE SURGICAL GLOVES: Brand: PROTEXIS

## (undated) DEVICE — CLIP INT L ORNG TI TRNSVRS GRV CHEVRON SHP

## (undated) DEVICE — SUT PERMA- 0 18IN FSL NABSRB BLK L30MM 3/8

## (undated) DEVICE — SUT POLYDEK 2-0 75CM NABSRB GRN

## (undated) DEVICE — UMBILICAL TAPE: Brand: DEROYAL

## (undated) DEVICE — COR-KNOT MINI® COMBO KITBASE PACKAGE TYPE - KITEACH STERILE PACKAGE KIT CONTAINS (2) SINGLE PATIENT USE COR-KNOT MINI® DEVICES AND (12) COR-KNOT® QUICK LOADS®.: Brand: COR-KNOT MINI®

## (undated) DEVICE — SYRINGE MED 30ML STD CLR PLAS LL TIP N CTRL

## (undated) DEVICE — CABLE SUR L12IN SM CLP LNG DISP

## (undated) DEVICE — COR-KNOT® QUICK LOAD® 6-POUCH: Brand: COR-KNOT® QUICK LOAD®

## (undated) DEVICE — SUT PROL 8-0 18IN BV130-5 NABSRB BLU 6.5MM 3/

## (undated) DEVICE — SUT PROL 5-0 36IN C-1 NABSRB BLU 13MM 3/8 CIR

## (undated) DEVICE — SUT POLYDEK 3-0 24IN NABSRB GRN

## (undated) DEVICE — GLOVE SUR 7 BIOGEL PI ORTHOPRO PIP BRN PWD F

## (undated) DEVICE — CABLE PT L10FT ELECTRD PIN DIA1-2MM VENTRICLE

## (undated) NOTE — ED AVS SNAPSHOT
THE [facility] Emergency Department in [address]    Phone:  [phone]    Fax:  [phone]   MRN: [id number]    Department:  THE [facility] Emergency Department in [place]   Date of Visit - Ondansetron HCl 4 mg tablet            Discharge References/Attachments     VERTIGO, BENIGN PAROXYSMAL POSITIONAL (ENGLISH)      Disclosure     Insurance plans vary and the physician(s) referred by the ER may not be covered by your plan.  Please contact y If you have been prescribed any medication(s), please fill your prescription right away and begin taking the medication(s) as directed    If the emergency physician has read X-rays, these will be re-interpreted by a radiologist.  If there is a significant can help with your Affordable Care Act coverage, as well as all types of Medicaid plans. To get signed up and covered, please call [phone] and ask to get set up for an insurance coverage that is in-network with [facility].         Lesvia Final result    Impression:    CONCLUSION:  No acute intracranial process. If there is clinical concern for acute ischemia/infarction, an MRI of the brain would be recommended for further evaluation.            Dictated by: Matthew Yu MD on 1/12/2017 limits. No focal consolidation, pneumothorax or pleural effusion. MyChart     Sign up for [employer], your secure online medical record.   [facility] will allow you to access patient instructions from your recent visit,  view other health information,

## (undated) NOTE — ED AVS SNAPSHOT
THE [facility] Emergency Department in [address]    Phone:  [phone]    Fax:  [phone]   MRN: [id number]    Department:  THE [facility] Emergency Department in [place]   Date of Visit IF THERE IS ANY CHANGE OR WORSENING OF YOUR CONDITION, CALL YOUR PRIMARY CARE PHYSICIAN AT ONCE OR RETURN IMMEDIATELY TO THE EMERGENCY DEPARTMENT.     If you have been prescribed any medication(s), please fill your prescription right away and begin taking t

## (undated) NOTE — LETTER
Maninder Watkins M.D., F.A.C.S.  Terell Cortes M.D., F.A.C.S.  Colby Vogt M.D.   Jeremy Fuentes M.D., F.A.C.S.  LEAH Segura M.D., F.A.C.S.   Bautista Wilson M.D., F.A.C.S.  Sreekanth López M.D.    LEAH Claudio M.D.   LEAH Lopez M.D., M.B.A.  Cole Awan M.D.  LEAH Lewis M.D., F.A.C.S.  Howie Diaz M.D., F.A.C.S.   LEAH Serra M.D., F.A.C.S., F.A.C.C. Mj Calvert M.D.  Claudio Greer M.D.    AVELINO Hager D.O., F.A.C.S.  Martín Olmstead M.D.   Selvin Dominguez M.D.  Genaro Uribe M.D.    Luis Ellis M.D., F.A.C.S.        Welcome to [facility]    As you contemplate possible surgical treatment, it is very important to us that you understand fully what is being discussed, that all of your questions have been answered, and that your options for treatment have been fully explained.    To that end, on the following page we will ask you some questions to make certain that you understand everything which has been explained to you. Included in this understanding is that there are both surgical and nonsurgical treatments available for you, that you have options regarding where your care is given, and what doctors are involved in your care. Included in these options would, of course, be the option to elect for no treatment whatsoever. We especially want to be sure that you have had a chance to have all of your questions and concerns answered. If there are any issues which have not been adequately addressed, we ask you to bring them forward so that we can thoroughly address them.    A patient who is fully informed and understands their condition and options for treatment, as well as potential adverse effects of treatment, is going to be a patient who receives the most benefit out of care rendered.  Our goal in addition to providing excellent surgical care is to provide the necessary information to you and your family in order to make decisions which are appropriate relative to your own care.    Please take the time necessary to read and answer the questions on the next page. Again, if you have any questions, bring them forward and we will certainly address them.    Sincerely,    [facility]    Date:___________________ _______________________ _______________________       Printed Patient Name  Signature of Patient    Date:___________________ _______________________ _______________________       Printed Witness Name  Signature of Witness    _______________________       Relationship of Witness to Patient        Consent Form Page: 1 of 2  [address] [facility] * [address] [facility] [address] * [place] [address] * [phone] / [phone] *  Fax [phone] * Billing Fax [phone] Version: 9/9/2024    [facility]  Supplemental Consent Form    A [facility] surgeon has met with me and explained the matter of my illness, and what treatments might be available to improve my condition. As a result of that conversation, I understand the following:    A [facility] surgeon met with me and explained, in detail, the nature of my condition for which surgery is being contemplated. The procedure being performed is:  AORTIC VALVE REPLACEMENT/TISSUE VALVE,   ENCOMPASS MAZE, PLATES     Yes _____ No _____    A [facility] surgeon met with me and explained to me that there are alternatives to surgery which may include no surgery, medical therapy, or interventional treatment, among other options and the risks and benefits of the different treatment options:Yes _____ No _____    A [facility] surgeon has explained to me that if I should desire, he/she is willing to explain my case and the surgical and non-surgical options to family members:            Yes _____ No _____    A [facility] surgeon has  answered all of my questions regarding the topics we have discussed. I have been invited to ask more questions:  Yes _____ No _____    A [facility] surgeon has explained to me that if I seek other options or wish treatment at elsewhere, that his/her office will assist me in making such recommendations:  Yes _____ No _____    A [facility] surgeon has explained to me that death, risk of bleeding, stroke, multi-organ failure, heart attack and/or other complications are risks for the proposed surgical procedure:        Yes _____ No _____    A [facility] surgeon has explained to me that I have the right to cancel or postpone the surgery at any time prior to the start of surgery:    Yes _____ No _____    The nature and options for treatment for my condition has been explained to me, in detail, by a [facility] surgeon and all questions have been answered to my satisfaction. I understand that I am not required to undergo surgery, and further, that if I so desire, I could have surgery accomplished by another surgeon or at another institution. I understand and accept that which has been explained to me. I am able to make my decisions knowingly and willingly based on the data.    Date:___________________ _______________________ _______________________       Printed Patient Name  Signature of Patient    Date:___________________ _______________________ _______________________       Printed Witness Name  Signature of Witness    _______________________   Relationship of Witness to Patient          Consent Form Page: 3 of  2  [address] [facility] * [address] * [place] [address] * [phone] / [phone] *  Fax [phone] * [place] Fax [phone] Version: 9/9/2024

## (undated) NOTE — LETTER
08/08/24    Lopez Miranda      To Whom It May Concern:    This letter has been written at the patient's request. The above patient was seen at [facility] for treatment of a medical condition from Pneumonia.    The patient may return to work/school on 8/12/2024.      Sincerely,        Elisabeth Elizabeth MD /Yi Christie RN  08/08/24, 8:42 PM

## (undated) NOTE — ED AVS SNAPSHOT
[facility] Emergency Department in [address]    Phone:  [phone]    Fax:  [phone]   MRN: [id number]    Department:  [facility] Emergency Department in [place]   Date of Visit IF THERE IS ANY CHANGE OR WORSENING OF YOUR CONDITION, CALL YOUR PRIMARY CARE PHYSICIAN AT ONCE OR RETURN IMMEDIATELY TO THE EMERGENCY DEPARTMENT.     If you have been prescribed any medication(s), please fill your prescription right away and begin taking t

## (undated) NOTE — LETTER
[address]  [address]  Authorization for Surgical Operation and Procedure   Date:___________                                                                                                         Time:__________  I hereby authorize* Surgery not found *, my physician and his/her assistants (if applicable), which may include medical students, residents, and/or fellows, to perform the following surgical operation/ procedure and administer such anesthesia as may be determined necessary by my physician:  Operation/Procedure name (s)  on Lopez Miranda   2.   I recognize that during the surgical operation/procedure, unforeseen conditions may necessitate additional or different procedures than those listed above.  I, therefore, further authorize and request that the above-named surgeon, assistants, or designees perform such procedures as are, in their judgment, necessary and desirable.    3.   My surgeon/physician has discussed prior to my surgery the potential benefits, risks and side effects of this procedure; the likelihood of achieving goals; and potential problems that might occur during recuperation.  They also discussed reasonable alternatives to the procedure, including risks, benefits, and side effects related to the alternatives and risks related to not receiving this procedure.  I have had all my questions answered and I acknowledge that no guarantee has been made as to the result that may be obtained.    4.   Should the need arise during my operation or immediate post-operative period, I also consent to the administration of blood and/or blood products.  Further, I understand that despite careful testing and screening of blood or blood products by collecting agencies, I may still be subject to ill effects as a result of receiving a blood transfusion  and/or blood products.  The following are some, but not all, of the potential risks that can occur: fever and allergic reactions, hemolytic reactions, transmission of diseases such as Hepatitis, AIDS and Cytomegalovirus (CMV) and fluid overload.  In the event that I wish to have an autologous transfusion of my own blood, or a directed donor transfusion.  I will discuss this with my physician.   5.   I authorize the use of any specimen, organs, tissues, body parts or foreign objects that may be removed from my body during the operation/procedure for diagnosis, research or teaching purposes and their subsequent disposal by hospital authorities.  I also authorize the release of specimen test results and/or written reports to my treating physician on the hospital medical staff or other referring or consulting physicians involved in my care, at the discretion of the Pathologist or my treating physician.    6.   I consent to the photographing or videotaping of the operations or procedures to be performed, including appropriate portions of my body for medical, scientific, or educational purposes, provided my identity is not revealed by the pictures or by descriptive texts accompanying them.  If the procedure has been photographed/videotaped, the surgeon will obtain the original picture, image, videotape or CD.  The hospital will not be responsible for storage, release or maintenance of the picture, image, tape or CD.    7.   I consent to the presence of a  or observers in the operating room as deemed necessary by my physician or their designees.    8.   I recognize that in the event my procedure results in extended X-Ray/fluoroscopy time, I may develop a skin reaction.    9. If I have a Do Not Attempt Resuscitation (DNAR) order in place, that status will be suspended while in the operating room, procedural suite, and during the recovery period unless otherwise explicitly stated by me (or a person  authorized to consent on my behalf). The surgeon or my attending physician will determine when the applicable recovery period ends for purposes of reinstating the DNAR order.  10. Patients having a sterilization procedure: I understand that if the procedure is successful the results will be permanent and it will therefore be impossible for me to inseminate, conceive, or bear children.  I also understand that the procedure is intended to result in sterility, although the result has not been guaranteed.   11. I acknowledge that my physician has explained sedation/analgesia administration to me including the risk and benefits I consent to the administration of sedation/analgesia as may be necessary or desirable in the judgment of my physician.    I CERTIFY THAT I HAVE READ AND FULLY UNDERSTAND THE ABOVE CONSENT TO OPERATION and/or OTHER PROCEDURE.      _________________________________________  __________________________________  Signature of Patient     Signature of Responsible Person         ___________________________________         Printed Name of Responsible Person           _________________________________                 Relationship to Patient  _________________________________________  ______________________________  Signature of Witness          Date  Time    Patient Name: Lopez Miranda     : 1955                 Printed: 2024     Medical Record #: [id number]                     Page 1 of 1

## (undated) NOTE — ED AVS SNAPSHOT
THE [facility] Emergency Department in [address]    Phone:  [phone]    Fax:  [address]   MRN: [id number]    Department:  THE [facility] Emergency Department in [place]   Date of Visit Insurance plans vary and the physician(s) referred by the ER may not be covered by your plan. Please contact your insurance company to determine coverage for follow-up care and referrals.     [facility]  Main [phone]  Pediatric If the emergency physician has read X-rays, these will be re-interpreted by a radiologist.  If there is a significant change in your reading, you will be contacted. Please make sure we have your correct phone number before you leave.  After you leave, you s and ask to get set up for an insurance coverage that is in-network with [employer] [facility].         Imaging Results         XR ROUTINE THORACIC SPINE (3 VIEWS) (CPT=72072) (Final result) Result time:  01/06/17 12:14:35    Final result    Impression: [employer]     Sign up for [employer], your secure online medical record. [employer] will allow you to access patient instructions from your recent visit,  view other health information, and more. To sign up or find more information, go to [email or web address]

## (undated) NOTE — LETTER
12/07/21        [place] [address] [facility] [address]      Dear Brodie Will,    [facility] records indicate that you have outstanding lab work and or testing that was ordered for you and has not yet been completed:  Orders Placed This Encounter

## (undated) NOTE — LETTER
December 29, 2023     Patient: Mishel Lindsey   YOB: 1955   Date of Visit: 12/28/2023       Dear Dr. Agustín Jewell-    I work with endocrinology. I saw your patient, Mishel Lindsey, on 12/28/2023 for Graves disease/hyperthyroidism. Enclosed is my consultation / progress note from that encounter. He has been receiving treatment for Graves disease since 8/2023 (dx'd in 2020, prior to his a-fib, but never treated it until this year because he felt he was asymptomatic). The hyperthyroidism is further fueled by the amiodarone, requiring high dose methimazole to control the Graves. His TFTs are trending down nicely. From endo standpoint, management of the Graves doesn't change in terms of weaning methimazole, other than the eventual cessation of amiodarone would reduce the need for such high dose MMI. The patient was asking me about the need for ablation if his TFTs are improving - I explained things from my perspective but ultimately directed him to contact your office with his questions, and let him know I'd send over the consult note. Have a happy new year!     Sincerely,                           BRUCE Avilez  MOB I [facility], [address] [facility], [address] [place] [address] Ave    Document electronically generated by:  BRUCE Avilez on 12/29/2023

## (undated) NOTE — Clinical Note
Can we fax a copy of my note to Dr. Andrey Vines office w/ Cardiology? I will also write a letter/cover sheet to attach to the fax before sending - just remind me tomorrow. Thanks!

## (undated) NOTE — LETTER
[address]  [address]  Authorization for Surgical Operation and Procedure   Date:___________                                                                                                         Time:__________  I hereby authorize* Surgery not found *, my physician and his/her assistants (if applicable), which may include medical students, residents, and/or fellows, to perform the following surgical operation/ procedure and administer such anesthesia as may be determined necessary by my physician:  Operation/Procedure name (s)  on Lopez Miranda   2.   I recognize that during the surgical operation/procedure, unforeseen conditions may necessitate additional or different procedures than those listed above.  I, therefore, further authorize and request that the above-named surgeon, assistants, or designees perform such procedures as are, in their judgment, necessary and desirable.    3.   My surgeon/physician has discussed prior to my surgery the potential benefits, risks and side effects of this procedure; the likelihood of achieving goals; and potential problems that might occur during recuperation.  They also discussed reasonable alternatives to the procedure, including risks, benefits, and side effects related to the alternatives and risks related to not receiving this procedure.  I have had all my questions answered and I acknowledge that no guarantee has been made as to the result that may be obtained.    4.   Should the need arise during my operation or immediate post-operative period, I also consent to the administration of blood and/or blood products.  Further, I understand that despite careful testing and screening of blood or blood products by collecting agencies, I may still be subject to ill effects as a result of receiving a blood transfusion  and/or blood products.  The following are some, but not all, of the potential risks that can occur: fever and allergic reactions, hemolytic reactions, transmission of diseases such as Hepatitis, AIDS and Cytomegalovirus (CMV) and fluid overload.  In the event that I wish to have an autologous transfusion of my own blood, or a directed donor transfusion.  I will discuss this with my physician.   5.   I authorize the use of any specimen, organs, tissues, body parts or foreign objects that may be removed from my body during the operation/procedure for diagnosis, research or teaching purposes and their subsequent disposal by hospital authorities.  I also authorize the release of specimen test results and/or written reports to my treating physician on the hospital medical staff or other referring or consulting physicians involved in my care, at the discretion of the Pathologist or my treating physician.    6.   I consent to the photographing or videotaping of the operations or procedures to be performed, including appropriate portions of my body for medical, scientific, or educational purposes, provided my identity is not revealed by the pictures or by descriptive texts accompanying them.  If the procedure has been photographed/videotaped, the surgeon will obtain the original picture, image, videotape or CD.  The hospital will not be responsible for storage, release or maintenance of the picture, image, tape or CD.    7.   I consent to the presence of a  or observers in the operating room as deemed necessary by my physician or their designees.    8.   I recognize that in the event my procedure results in extended X-Ray/fluoroscopy time, I may develop a skin reaction.    9. If I have a Do Not Attempt Resuscitation (DNAR) order in place, that status will be suspended while in the operating room, procedural suite, and during the recovery period unless otherwise explicitly stated by me (or a person  authorized to consent on my behalf). The surgeon or my attending physician will determine when the applicable recovery period ends for purposes of reinstating the DNAR order.  10. Patients having a sterilization procedure: I understand that if the procedure is successful the results will be permanent and it will therefore be impossible for me to inseminate, conceive, or bear children.  I also understand that the procedure is intended to result in sterility, although the result has not been guaranteed.   11. I acknowledge that my physician has explained sedation/analgesia administration to me including the risk and benefits I consent to the administration of sedation/analgesia as may be necessary or desirable in the judgment of my physician.    I CERTIFY THAT I HAVE READ AND FULLY UNDERSTAND THE ABOVE CONSENT TO OPERATION and/or OTHER PROCEDURE.      _________________________________________  __________________________________  Signature of Patient     Signature of Responsible Person         ___________________________________         Printed Name of Responsible Person           _________________________________                 Relationship to Patient  _________________________________________  ______________________________  Signature of Witness          Date  Time    Patient Name: Lopez Miranda     : 1955                 Printed: 2024     Medical Record #: [id number]                     Page 1 of 1

## (undated) NOTE — LETTER
[address]  [address]  Authorization for Surgical Operation and Procedure   Date:___________                                                                                                         Time:__________  I hereby authorize* Surgery not found *, my physician and his/her assistants (if applicable), which may include medical students, residents, and/or fellows, to perform the following surgical operation/ procedure and administer such anesthesia as may be determined necessary by my physician:  Operation/Procedure name (s)  on Lopez Miranda   2.   I recognize that during the surgical operation/procedure, unforeseen conditions may necessitate additional or different procedures than those listed above.  I, therefore, further authorize and request that the above-named surgeon, assistants, or designees perform such procedures as are, in their judgment, necessary and desirable.    3.   My surgeon/physician has discussed prior to my surgery the potential benefits, risks and side effects of this procedure; the likelihood of achieving goals; and potential problems that might occur during recuperation.  They also discussed reasonable alternatives to the procedure, including risks, benefits, and side effects related to the alternatives and risks related to not receiving this procedure.  I have had all my questions answered and I acknowledge that no guarantee has been made as to the result that may be obtained.    4.   Should the need arise during my operation or immediate post-operative period, I also consent to the administration of blood and/or blood products.  Further, I understand that despite careful testing and screening of blood or blood products by collecting agencies, I may still be subject to ill effects as a result of receiving a blood transfusion  and/or blood products.  The following are some, but not all, of the potential risks that can occur: fever and allergic reactions, hemolytic reactions, transmission of diseases such as Hepatitis, AIDS and Cytomegalovirus (CMV) and fluid overload.  In the event that I wish to have an autologous transfusion of my own blood, or a directed donor transfusion.  I will discuss this with my physician.   5.   I authorize the use of any specimen, organs, tissues, body parts or foreign objects that may be removed from my body during the operation/procedure for diagnosis, research or teaching purposes and their subsequent disposal by hospital authorities.  I also authorize the release of specimen test results and/or written reports to my treating physician on the hospital medical staff or other referring or consulting physicians involved in my care, at the discretion of the Pathologist or my treating physician.    6.   I consent to the photographing or videotaping of the operations or procedures to be performed, including appropriate portions of my body for medical, scientific, or educational purposes, provided my identity is not revealed by the pictures or by descriptive texts accompanying them.  If the procedure has been photographed/videotaped, the surgeon will obtain the original picture, image, videotape or CD.  The hospital will not be responsible for storage, release or maintenance of the picture, image, tape or CD.    7.   I consent to the presence of a  or observers in the operating room as deemed necessary by my physician or their designees.    8.   I recognize that in the event my procedure results in extended X-Ray/fluoroscopy time, I may develop a skin reaction.    9. If I have a Do Not Attempt Resuscitation (DNAR) order in place, that status will be suspended while in the operating room, procedural suite, and during the recovery period unless otherwise explicitly stated by me (or a person  authorized to consent on my behalf). The surgeon or my attending physician will determine when the applicable recovery period ends for purposes of reinstating the DNAR order.  10. Patients having a sterilization procedure: I understand that if the procedure is successful the results will be permanent and it will therefore be impossible for me to inseminate, conceive, or bear children.  I also understand that the procedure is intended to result in sterility, although the result has not been guaranteed.   11. I acknowledge that my physician has explained sedation/analgesia administration to me including the risk and benefits I consent to the administration of sedation/analgesia as may be necessary or desirable in the judgment of my physician.    I CERTIFY THAT I HAVE READ AND FULLY UNDERSTAND THE ABOVE CONSENT TO OPERATION and/or OTHER PROCEDURE.      _________________________________________  __________________________________  Signature of Patient     Signature of Responsible Person         ___________________________________         Printed Name of Responsible Person           _________________________________                 Relationship to Patient  _________________________________________  ______________________________  Signature of Witness          Date  Time    Patient Name: Lopez Miranda     : 1955                 Printed: 2024     Medical Record #: [id number]                     Page 1 of 1